# Patient Record
Sex: MALE | Race: WHITE | Employment: FULL TIME | URBAN - METROPOLITAN AREA
[De-identification: names, ages, dates, MRNs, and addresses within clinical notes are randomized per-mention and may not be internally consistent; named-entity substitution may affect disease eponyms.]

---

## 2017-01-10 ENCOUNTER — HOSPITAL ENCOUNTER (OUTPATIENT)
Dept: GENERAL RADIOLOGY | Age: 47
Discharge: HOME | End: 2017-01-10
Attending: INTERNAL MEDICINE
Payer: COMMERCIAL

## 2017-01-10 ENCOUNTER — OFFICE VISIT (OUTPATIENT)
Dept: INTERNAL MEDICINE CLINIC | Facility: CLINIC | Age: 47
End: 2017-01-10

## 2017-01-10 VITALS
TEMPERATURE: 98 F | SYSTOLIC BLOOD PRESSURE: 132 MMHG | DIASTOLIC BLOOD PRESSURE: 80 MMHG | WEIGHT: 197 LBS | HEART RATE: 88 BPM | RESPIRATION RATE: 12 BRPM | BODY MASS INDEX: 26.68 KG/M2 | HEIGHT: 72 IN

## 2017-01-10 DIAGNOSIS — R07.89 OTHER CHEST PAIN: Primary | ICD-10-CM

## 2017-01-10 DIAGNOSIS — R07.89 OTHER CHEST PAIN: ICD-10-CM

## 2017-01-10 PROBLEM — R07.9 CHEST PAIN: Status: ACTIVE | Noted: 2017-01-10

## 2017-01-10 PROCEDURE — 71020 XR CHEST PA + LAT CHEST (CPT=71020): CPT

## 2017-01-10 PROCEDURE — 93010 ELECTROCARDIOGRAM REPORT: CPT | Performed by: INTERNAL MEDICINE

## 2017-01-10 PROCEDURE — 99214 OFFICE O/P EST MOD 30 MIN: CPT | Performed by: INTERNAL MEDICINE

## 2017-01-10 PROCEDURE — 93005 ELECTROCARDIOGRAM TRACING: CPT | Performed by: INTERNAL MEDICINE

## 2017-01-10 PROCEDURE — 99212 OFFICE O/P EST SF 10 MIN: CPT | Performed by: INTERNAL MEDICINE

## 2017-01-10 NOTE — PROGRESS NOTES
HPI:    Patient ID: Myla Greenberg is a 40year old male. Chest Pain   This is a new (sudden sharp jabbing pain on left side of chest, lasting for 1 to 2 seconds) problem. The current episode started in the past 7 days. The onset quality is sudden.  The pr Solution Auto-injector  Disp:  Rfl:    azithromycin (ZITHROMAX Z-SHITAL) 250 MG Oral Tab Take two tablets by mouth today, then one tablet daily. Disp: 6 tablet Rfl: 0     Allergies:  Bee                         Comment:Other reaction(s):  Anaphylaxis, throat c eval today. - EKG In-Office [52406]      No orders of the defined types were placed in this encounter.        Meds This Visit:  No prescriptions requested or ordered in this encounter    Imaging & Referrals:  None       DP#[id number]

## 2017-02-01 ENCOUNTER — TELEPHONE (OUTPATIENT)
Dept: INTERNAL MEDICINE CLINIC | Facility: CLINIC | Age: 47
End: 2017-02-01

## 2017-02-01 DIAGNOSIS — R07.9 CHEST PAIN, UNSPECIFIED TYPE: Primary | ICD-10-CM

## 2017-02-01 NOTE — TELEPHONE ENCOUNTER
Pt verifies info below; states Dr Charles Husain had advised pt to monitor symptoms as is low risk for cardiac event, and f/u if no improvement.  States muscle cramp on left chest is still same as discussed during 1/10/17 OV; there has been no changes in intensity or f

## 2017-02-01 NOTE — TELEPHONE ENCOUNTER
Pt called in more or less for a follow up on his muscle spasms around his chest area. Pt states instead of coming in for an appt with Dr. Jazmine Farfan on Friday, he is wondering if Dr. Jazmine Farfan can guide him into the next steps?   Pt states he is still having these spor

## 2017-02-01 NOTE — TELEPHONE ENCOUNTER
Pt informed regarding stress echo ordered by Dr Mi Otto. Pt thankful and will schedule it now; transferred to Central scheduling and provided with phone # in case disconnects.

## 2017-02-15 ENCOUNTER — HOSPITAL ENCOUNTER (OUTPATIENT)
Dept: CV DIAGNOSTICS | Facility: HOSPITAL | Age: 47
Discharge: HOME | End: 2017-02-15
Attending: INTERNAL MEDICINE
Payer: COMMERCIAL

## 2017-02-15 ENCOUNTER — OFFICE VISIT (OUTPATIENT)
Dept: SLEEP CENTER | Age: 47
End: 2017-02-15
Attending: INTERNAL MEDICINE
Payer: COMMERCIAL

## 2017-02-15 DIAGNOSIS — Z76.89 SLEEP CONCERN: Primary | ICD-10-CM

## 2017-02-15 DIAGNOSIS — R07.9 CHEST PAIN, UNSPECIFIED TYPE: ICD-10-CM

## 2017-02-15 PROCEDURE — 93350 STRESS TTE ONLY: CPT | Performed by: INTERNAL MEDICINE

## 2017-02-15 PROCEDURE — 93350 STRESS TTE ONLY: CPT

## 2017-02-15 PROCEDURE — 93018 CV STRESS TEST I&R ONLY: CPT | Performed by: INTERNAL MEDICINE

## 2017-02-15 PROCEDURE — 95810 POLYSOM 6/> YRS 4/> PARAM: CPT

## 2017-02-15 PROCEDURE — 93016 CV STRESS TEST SUPVJ ONLY: CPT | Performed by: INTERNAL MEDICINE

## 2017-02-15 PROCEDURE — 93017 CV STRESS TEST TRACING ONLY: CPT

## 2017-02-17 ENCOUNTER — TELEPHONE (OUTPATIENT)
Dept: INTERNAL MEDICINE CLINIC | Facility: CLINIC | Age: 47
End: 2017-02-17

## 2017-02-17 ENCOUNTER — HOSPITAL ENCOUNTER (OUTPATIENT)
Age: 47
Discharge: HOME | End: 2017-02-17
Attending: EMERGENCY MEDICINE
Payer: COMMERCIAL

## 2017-02-17 VITALS
HEART RATE: 98 BPM | RESPIRATION RATE: 18 BRPM | TEMPERATURE: 99 F | HEIGHT: 73 IN | WEIGHT: 190 LBS | DIASTOLIC BLOOD PRESSURE: 80 MMHG | SYSTOLIC BLOOD PRESSURE: 134 MMHG | BODY MASS INDEX: 25.18 KG/M2 | OXYGEN SATURATION: 98 %

## 2017-02-17 DIAGNOSIS — J01.90 ACUTE SINUSITIS, RECURRENCE NOT SPECIFIED, UNSPECIFIED LOCATION: Primary | ICD-10-CM

## 2017-02-17 PROCEDURE — 99213 OFFICE O/P EST LOW 20 MIN: CPT

## 2017-02-17 PROCEDURE — 99214 OFFICE O/P EST MOD 30 MIN: CPT

## 2017-02-17 RX ORDER — AZITHROMYCIN 250 MG/1
TABLET, FILM COATED ORAL
Qty: 1 PACKAGE | Refills: 0 | Status: SHIPPED | OUTPATIENT
Start: 2017-02-17 | End: 2017-03-30 | Stop reason: ALTCHOICE

## 2017-02-17 NOTE — ED INITIAL ASSESSMENT (HPI)
Complains of uri cough cold fever and exudate from eyes. For one week. Wants prescription for Rakesh Escalante.

## 2017-02-17 NOTE — ED PROVIDER NOTES
Patient Seen in: [facility] In [address] [place] [address]    History   Patient presents with:  Cough/URI    Stated Complaint: fever/uri    HPI    Patient is a 46-year-old male that started about 5 or 6 days ago with a sore throat and he developed sin • Diabetes Father 79     per NG   • Obesity Father      per NG   • Hypertension Mother      per NG   • Obesity Mother      per NG   • Thyroid Disorder Mother      per NG   • Heart Disease Paternal Grandfather 79     CAD, Cause of death; per NG   • Other[ rhythm, normal heart sounds and intact distal pulses. Pulmonary/Chest: Effort normal and breath sounds normal. No respiratory distress. Abdominal: Soft. Bowel sounds are normal. Exhibits no distension and no mass. There is no tenderness.  There is no r

## 2017-02-17 NOTE — TELEPHONE ENCOUNTER
Pt contacted, he states he just went to IC, he did not want to wait any longer. No further assistance needed from triage.

## 2017-02-17 NOTE — TELEPHONE ENCOUNTER
Pt. States that he has a low grade fever 100-101, and possible sinus infection. Pt. Wants to know if  can send a Rx for an antibiotic to his [facility] Pt. Requesting to speak to RN.

## 2017-03-01 ENCOUNTER — TELEPHONE (OUTPATIENT)
Dept: INTERNAL MEDICINE CLINIC | Facility: CLINIC | Age: 47
End: 2017-03-01

## 2017-03-01 DIAGNOSIS — G47.33 OSA (OBSTRUCTIVE SLEEP APNEA): Primary | ICD-10-CM

## 2017-03-01 NOTE — TELEPHONE ENCOUNTER
Notes Recorded by Kenneth Del Angel MD on 2/25/2017 at 7:40 AM  The data generated from this study is consistent with mild obstructive sleep apnea which becomes moderate in REM sleep (ICD 10 code G47.33).     RECOMMENDATIONS:     1.      Can consider AppTap

## 2017-03-03 NOTE — TELEPHONE ENCOUNTER
Pt is calling back, pt would like a call back today, states he has been calling since 3/1. Please advise

## 2017-03-07 NOTE — TELEPHONE ENCOUNTER
Pt called, message per Dr given. Verbalized understanding and compliance  Requested to have order for CPAP placed, however, unsure if he will complete. Wants to think about it.   Order placed

## 2017-03-30 ENCOUNTER — OFFICE VISIT (OUTPATIENT)
Dept: INTERNAL MEDICINE CLINIC | Facility: CLINIC | Age: 47
End: 2017-03-30

## 2017-03-30 VITALS
TEMPERATURE: 97 F | WEIGHT: 190 LBS | HEART RATE: 70 BPM | HEIGHT: 73 IN | DIASTOLIC BLOOD PRESSURE: 78 MMHG | SYSTOLIC BLOOD PRESSURE: 125 MMHG | BODY MASS INDEX: 25.18 KG/M2

## 2017-03-30 DIAGNOSIS — H93.13 TINNITUS, BILATERAL: Primary | ICD-10-CM

## 2017-03-30 DIAGNOSIS — H91.93 DECREASED HEARING, BILATERAL: ICD-10-CM

## 2017-03-30 PROCEDURE — 99212 OFFICE O/P EST SF 10 MIN: CPT | Performed by: INTERNAL MEDICINE

## 2017-03-30 PROCEDURE — 99213 OFFICE O/P EST LOW 20 MIN: CPT | Performed by: INTERNAL MEDICINE

## 2017-03-30 NOTE — PROGRESS NOTES
HPI:    Patient ID: Christian Aviles is a 40year old male. Ear Problem   There is pain in the right (complained of some discomfort, decreased hearing) ear. This is a new problem. The current episode started in the past 7 days.  The problem occurs constantly exhibits no discharge. No scleral icterus. Neck: Normal range of motion. Neck supple. No JVD present. No thyromegaly present. Lymphadenopathy:     He has no cervical adenopathy. Neurological: He is alert. Skin: He is not diaphoretic.               A

## 2017-04-03 ENCOUNTER — OFFICE VISIT (OUTPATIENT)
Dept: AUDIOLOGY | Facility: CLINIC | Age: 47
End: 2017-04-03

## 2017-04-03 ENCOUNTER — OFFICE VISIT (OUTPATIENT)
Dept: OTOLARYNGOLOGY | Facility: CLINIC | Age: 47
End: 2017-04-03

## 2017-04-03 VITALS
TEMPERATURE: 98 F | HEIGHT: 73 IN | BODY MASS INDEX: 25.18 KG/M2 | WEIGHT: 190 LBS | SYSTOLIC BLOOD PRESSURE: 124 MMHG | DIASTOLIC BLOOD PRESSURE: 82 MMHG

## 2017-04-03 DIAGNOSIS — H93.13 TINNITUS, BILATERAL: Primary | ICD-10-CM

## 2017-04-03 DIAGNOSIS — H69.91 EUSTACHIAN TUBE DISORDER, RIGHT: Primary | ICD-10-CM

## 2017-04-03 PROCEDURE — 92567 TYMPANOMETRY: CPT | Performed by: AUDIOLOGIST

## 2017-04-03 PROCEDURE — 99212 OFFICE O/P EST SF 10 MIN: CPT | Performed by: OTOLARYNGOLOGY

## 2017-04-03 PROCEDURE — 92557 COMPREHENSIVE HEARING TEST: CPT | Performed by: AUDIOLOGIST

## 2017-04-03 PROCEDURE — 99213 OFFICE O/P EST LOW 20 MIN: CPT | Performed by: OTOLARYNGOLOGY

## 2017-04-03 RX ORDER — METHYLPREDNISOLONE 4 MG/1
TABLET ORAL
Qty: 1 PACKAGE | Refills: 0 | Status: SHIPPED | OUTPATIENT
Start: 2017-04-03 | End: 2017-06-21 | Stop reason: ALTCHOICE

## 2017-04-03 NOTE — PROGRESS NOTES
Rodrigo Daniel is a 40year old male.   Patient presents with:  Ear Problem: pt feels like there is water in both ears for 1.5 weeks, right ear is worse, pain comes and goes       HISTORY OF PRESENT ILLNESS    6/1/16 Patient presents for evaluation of a 4 wee Years of Education:                 Number of children:               Social History Main Topics    Smoking Status: Never Smoker                      Smokeless Status: Never Used                        Comment: per NG    Alcohol Use: Yes Eyes Negative Blurred vision and vision changes. Respiratory Negative Dyspnea and wheezing. Cardio Negative Chest pain, irregular heartbeat/palpitations and syncope. GI Negative Abdominal pain and diarrhea.    Endocrine Negative Cold intolerance and methylPREDNISolone 4 MG Oral Tablet Therapy Pack, Take by oral route., Disp: 1 Package, Rfl: 0  •  ClonazePAM 0.5 MG Oral Tab, Take 1 tablet (0.5 mg total) by mouth nightly as needed for Anxiety. , Disp: 10 tablet, Rfl: 0  •  Esomeprazole Magnesium 40 MG Or

## 2017-04-03 NOTE — PROGRESS NOTES
AUDIOGRAM     Myla Greenberg was referred for testing by Abhay Cary due to a sensation of water in his right ear and bilateral tinnitus.    12/14/1970  [id number]    Otoscopic Inspection:  Right ear:  No cerumen  Left ear:  No cerumen    Audiometric Test

## 2017-04-03 NOTE — PATIENT INSTRUCTIONS
Understanding Nasal Allergies  Nasal allergies (also called allergic rhinitis) are a common health problem. They may be seasonal. This means they cause symptoms only at certain times of the year.  Or they may be perennial. This means they cause symptoms a Treatment  Your healthcare provider will evaluate you to find the cause of your symptoms then recommend treatment.  If your symptoms are due to nasal allergies, your healthcare provider may prescribe nasal steroid sprays or oral antihistamines to help reduc

## 2017-05-30 NOTE — TELEPHONE ENCOUNTER
Pt is asking to have a meds refilled   Pt will be flying out of town very soon   Pt only need to have 10pills for the clonazepam     Current Outpatient Prescriptions:  ClonazePAM 0.5 MG Oral Tab Take 1 tablet (0.5 mg total) by mouth nightly as needed for A

## 2017-05-31 RX ORDER — ESOMEPRAZOLE MAGNESIUM 40 MG/1
CAPSULE, DELAYED RELEASE ORAL
Qty: 90 CAPSULE | Refills: 0 | Status: SHIPPED | OUTPATIENT
Start: 2017-05-31

## 2017-05-31 NOTE — TELEPHONE ENCOUNTER
Rx refilled for 90 days. Pt will be due to for annual OV this July. Alternatively, pt may ask his PCP for additional refills.

## 2017-06-02 ENCOUNTER — TELEPHONE (OUTPATIENT)
Dept: INTERNAL MEDICINE CLINIC | Facility: CLINIC | Age: 47
End: 2017-06-02

## 2017-06-02 ENCOUNTER — TELEPHONE (OUTPATIENT)
Dept: GASTROENTEROLOGY | Facility: CLINIC | Age: 47
End: 2017-06-02

## 2017-06-02 RX ORDER — CLONAZEPAM 0.5 MG/1
0.5 TABLET ORAL NIGHTLY PRN
Qty: 6 TABLET | Refills: 0 | OUTPATIENT
Start: 2017-06-02 | End: 2017-06-21 | Stop reason: ALTCHOICE

## 2017-06-02 RX ORDER — AZITHROMYCIN 250 MG/1
TABLET, FILM COATED ORAL
Qty: 6 TABLET | Refills: 0 | Status: SHIPPED | OUTPATIENT
Start: 2017-06-02 | End: 2017-06-21 | Stop reason: ALTCHOICE

## 2017-06-02 NOTE — TELEPHONE ENCOUNTER
Received denial letter for esomeprazole from [address] [place] [address] on 6/2/17 @3527. I contacted [address] [place] [address] again to review the Appeals process.    I spoke with Jody Dang and she states that they need a letter from the provider stating that \"it is medically necessary for the p

## 2017-06-02 NOTE — TELEPHONE ENCOUNTER
Per pt, he is wondering why his Clonazepam was not refilled? Pt is going out of town and need this med for flying. Per pt, he needs only 10 tabs.  Pls advise,

## 2017-06-02 NOTE — TELEPHONE ENCOUNTER
Per Pharmacist, pt prefers Omeprazole instead of pantoprazole- but insurance preferred is Pantoprazole and so  requires PA on Omeprazole. Requesting a call back to advise.

## 2017-06-02 NOTE — TELEPHONE ENCOUNTER
Actions Requested: requesting Zpack/ clonazepam flying next week for vacation  Situation/Background   Problem: sinus issues   Onset: yesterday   Associated Symptoms: taking Zicam,,pressure between eyes,little cough/congestion-stated his children had been s pain (lower forehead, cheekbone, or eye) persists  * Lots of coughing    Care Advice Discussed:  * Reassurance

## 2017-06-02 NOTE — TELEPHONE ENCOUNTER
Please order a Z SHITAL for patient. Have patient see me if not improving in next few days. Order 6 of the clonazepam 0.5 mg to be used 1 hour before flight.

## 2017-06-02 NOTE — TELEPHONE ENCOUNTER
Please sign off on pended Clonazepam order ( for phone in) for 6 tablets. Thank you. Z-pack order sent to pharmacy.

## 2017-06-02 NOTE — TELEPHONE ENCOUNTER
Denial letter, appeal letter, and clinicals faxed to the [facility] department ([phone]). -Awaiting insurance decision.

## 2017-06-02 NOTE — TELEPHONE ENCOUNTER
Dr. Catherine Solares for Dr. Melvi Sheikh, d/t being out of office. Pt requesting Clonazepam. States needs this med for flying and is going out of town soon. Requesting only 10 tablets please review. Thank you.     Refill Protocol Appointment Criteria  · Appointment sc

## 2017-06-02 NOTE — TELEPHONE ENCOUNTER
Clonazepam called into the [facility] pharmacy in [facility] as ordered on 6/2/17 by Dr. Gt Chaney. Message was left that the medication was sent to the pharmacy. To call back only if needed.

## 2017-06-02 NOTE — TELEPHONE ENCOUNTER
Contacted [facility] pharmacy and spoke to Duncan who states that a PA is required for nexium. Pt ID MLIN:[id number].      I contacted Darliss Cheadle at [phone] and spoke to Garret Isidro who states that this medication is a \"benefit exclusion\" and that she will sen

## 2017-06-06 RX ORDER — CLONAZEPAM 0.5 MG/1
0.5 TABLET ORAL NIGHTLY PRN
Qty: 10 TABLET | Refills: 0 | Status: SHIPPED | OUTPATIENT
Start: 2017-06-06 | End: 2017-07-20

## 2017-06-08 NOTE — TELEPHONE ENCOUNTER
Stacey contacted at [employer] and she transferred me to [phone] and I spoke to Alison Almeida and he transferred me again to [phone].  I spoke to [facility] and she states that they did receive the appeal and they should reach a decision by 6/23/17 regarding th

## 2017-06-20 NOTE — TELEPHONE ENCOUNTER
Contacted [employer] and spoke with Malka (call reference number [id number]). He states that the insurance upheld their original decision to reject the claim for nexium on 6/13/2017. -LM for patient to CB to inform.

## 2017-06-21 ENCOUNTER — OFFICE VISIT (OUTPATIENT)
Dept: INTERNAL MEDICINE CLINIC | Facility: CLINIC | Age: 47
End: 2017-06-21

## 2017-06-21 VITALS
DIASTOLIC BLOOD PRESSURE: 81 MMHG | BODY MASS INDEX: 26 KG/M2 | SYSTOLIC BLOOD PRESSURE: 128 MMHG | HEART RATE: 69 BPM | WEIGHT: 194.31 LBS

## 2017-06-21 DIAGNOSIS — R42 DIZZINESS: Primary | ICD-10-CM

## 2017-06-21 PROCEDURE — 99212 OFFICE O/P EST SF 10 MIN: CPT | Performed by: INTERNAL MEDICINE

## 2017-06-21 PROCEDURE — 99213 OFFICE O/P EST LOW 20 MIN: CPT | Performed by: INTERNAL MEDICINE

## 2017-06-21 RX ORDER — MOMETASONE 50 UG/1
SPRAY, METERED NASAL DAILY
COMMUNITY
End: 2018-07-19

## 2017-06-21 NOTE — PROGRESS NOTES
Patient ID: Ministerio Regan is a 40year old male. Patient presents with:  Dizziness       HISTORY OF PRESENT ILLNESS:   HPI  Patient presents for above. Patient states he is feeling the sensation that he is on a cruise ship for the past 4 days.   Patient d Furoate 50 MCG/ACT Nasal Suspension, by Nasal route daily. , Disp: , Rfl:   •  ESOMEPRAZOLE MAGNESIUM 40 MG Oral Capsule Delayed Release, TAKE 1 CAPSULE (40 MG TOTAL) BY MOUTH DAILY. , Disp: 90 capsule, Rfl: 0  •  ClonazePAM 0.5 MG Oral Tab, Take 1 tablet (0 and oriented to person, place, and time. No cranial nerve deficit. Coordination normal.   Skin: Skin is warm and dry.      Orthostatics:  Laying: Blood pressure 127/81, heart rate 69  Sitting: Blood pressure 160/93, heart rate 78  Standing: Blood pressure 1

## 2017-06-21 NOTE — PATIENT INSTRUCTIONS
1.  Take Dramamine as was doing on cruise ship. 2.  If no improvement by early next week then make appointment for follow-up for new medication. Dizziness (Uncertain Cause)  Dizziness is a common symptom.  It may be described as lightheadedness, spinning, Date Last Reviewed: 8/23/2015  © 9541-2947 [address], [address] [place], [place] [address]. All rights reserved. This information is not intended as a substitute for professional medical care.  Always follow your healthcare professional

## 2017-06-23 NOTE — TELEPHONE ENCOUNTER
Attempted to reach patient again and unable to get a hold of him.  LMTCB    I called [facility] pharmacy and spoke with Nalini Davis and informed her that not only did the office complete a PA on the nexium, but we also sent an appeals letter which the insurance still

## 2017-06-26 ENCOUNTER — OFFICE VISIT (OUTPATIENT)
Dept: OTOLARYNGOLOGY | Facility: CLINIC | Age: 47
End: 2017-06-26

## 2017-06-26 ENCOUNTER — OFFICE VISIT (OUTPATIENT)
Dept: AUDIOLOGY | Facility: CLINIC | Age: 47
End: 2017-06-26

## 2017-06-26 ENCOUNTER — OFFICE VISIT (OUTPATIENT)
Dept: INTERNAL MEDICINE CLINIC | Facility: CLINIC | Age: 47
End: 2017-06-26

## 2017-06-26 VITALS
TEMPERATURE: 99 F | BODY MASS INDEX: 25.58 KG/M2 | HEIGHT: 73 IN | SYSTOLIC BLOOD PRESSURE: 130 MMHG | DIASTOLIC BLOOD PRESSURE: 82 MMHG | WEIGHT: 193 LBS

## 2017-06-26 VITALS
BODY MASS INDEX: 26.14 KG/M2 | HEIGHT: 72 IN | DIASTOLIC BLOOD PRESSURE: 87 MMHG | RESPIRATION RATE: 12 BRPM | SYSTOLIC BLOOD PRESSURE: 123 MMHG | TEMPERATURE: 97 F | WEIGHT: 193 LBS | HEART RATE: 73 BPM

## 2017-06-26 DIAGNOSIS — H93.13 TINNITUS, BILATERAL: ICD-10-CM

## 2017-06-26 DIAGNOSIS — R42 DIZZINESS: Primary | ICD-10-CM

## 2017-06-26 DIAGNOSIS — R42 DIZZINESS: ICD-10-CM

## 2017-06-26 DIAGNOSIS — R42 DISEQUILIBRIUM: Primary | ICD-10-CM

## 2017-06-26 PROCEDURE — 92567 TYMPANOMETRY: CPT | Performed by: AUDIOLOGIST

## 2017-06-26 PROCEDURE — 99212 OFFICE O/P EST SF 10 MIN: CPT | Performed by: OTOLARYNGOLOGY

## 2017-06-26 PROCEDURE — 92557 COMPREHENSIVE HEARING TEST: CPT | Performed by: AUDIOLOGIST

## 2017-06-26 PROCEDURE — 99213 OFFICE O/P EST LOW 20 MIN: CPT | Performed by: OTOLARYNGOLOGY

## 2017-06-26 PROCEDURE — 99214 OFFICE O/P EST MOD 30 MIN: CPT | Performed by: INTERNAL MEDICINE

## 2017-06-26 PROCEDURE — 99212 OFFICE O/P EST SF 10 MIN: CPT | Performed by: INTERNAL MEDICINE

## 2017-06-26 RX ORDER — MECLIZINE HCL 12.5 MG/1
12.5 TABLET ORAL 3 TIMES DAILY PRN
Qty: 30 TABLET | Refills: 4 | Status: SHIPPED | OUTPATIENT
Start: 2017-06-26 | End: 2017-07-06

## 2017-06-26 NOTE — PROGRESS NOTES
Christian Aviles is a 40year old male.   Patient presents with:  Dizziness: Patient is here today c/o motion sickness since returning from a cruise nine days ago      [address] [place] [address]    6/1/16 Patient presents for evaluation of a 4 week history of a on a cruise especially if he sits down he will have a tremendous amount of feeling almost like using to be seasick.   No new changes with his ears  Social History    Marital status:              Spouse name:                       Years of education: wisdom tooth: Extraction; per NG  No date: UPPER GI ENDOSCOPY PERFORMED      REVIEW OF SYSTEMS    System Neg/Pos Details   Constitutional Negative Fatigue, fever and weight loss. ENMT Negative Drooling. Eyes Negative Blurred vision and vision changes. Nose/Mouth/Throat Normal Nares - Right: Normal Left: Normal. Septum -Normal  Turbinates - Right: Normal, Left: Normal.         Current Outpatient Prescriptions:   •  Meclizine HCl 12.5 MG Oral Tab, Take 1 tablet (12.5 mg total) by mouth 3 (three) times d

## 2017-06-26 NOTE — PATIENT INSTRUCTIONS
Understanding Dizziness, Balance Problems, and Fainting  Balance is a group effort of the eyes, inner ear, joints, and muscles. They each send signals to the brain about body position and head movement.  Then the brain uses this information to achieve bal

## 2017-06-26 NOTE — PROGRESS NOTES
HPI:    Patient ID: Storm Headley is a 40year old male. Dizziness   This is a new (pt's feel equilibrium is off) problem. The current episode started 1 to 4 weeks ago (9 days after cruise). The problem occurs constantly. The problem has been unchanged. well-developed and well-nourished. No distress. HENT:   Right Ear: External ear normal.   Left Ear: External ear normal.   Nose: Nose normal.   Mouth/Throat: Oropharynx is clear and moist. No oropharyngeal exudate.    Eyes: Conjunctivae and EOM are normal however given persistent symptom, I would proceed with mri of brain/IAC. I mentioned to him a rare condition called disembarkment syndrome which can be sometimes seen in people after disembarking from ships/boat which he did.   I advised him also to see ENT

## 2017-06-27 ENCOUNTER — TELEPHONE (OUTPATIENT)
Dept: INTERNAL MEDICINE CLINIC | Facility: CLINIC | Age: 47
End: 2017-06-27

## 2017-06-27 DIAGNOSIS — R42 DISEQUILIBRIUM: Primary | ICD-10-CM

## 2017-06-27 NOTE — TELEPHONE ENCOUNTER
[employer] is stating patient meets their criteria for a MRI Adam Edwardscher without and with contrast [id number]. Would you like to change test or speak with physician reviewer? Please advise.

## 2017-06-27 NOTE — TELEPHONE ENCOUNTER
I am ok with the mri brain with and without contrast. I had put new order in epic. pls have one of staff notify pt of approval for mri so he can get it scheduled and done. Thanks for your help. I appreciate it.  Dr Ace Cook

## 2017-06-27 NOTE — PROGRESS NOTES
AUDIOGRAM     [place] was referred for testing by Christy Redman for dizziness and tinnitus   12/14/1970  [id number]      Otoscopic inspection: right ear no cerumen; left ear no cerumen.        Tests/Procedures  Patient was tested via  standard i

## 2017-06-28 ENCOUNTER — TELEPHONE (OUTPATIENT)
Dept: GASTROENTEROLOGY | Facility: CLINIC | Age: 47
End: 2017-06-28

## 2017-06-28 ENCOUNTER — OFFICE VISIT (OUTPATIENT)
Dept: SLEEP CENTER | Age: 47
End: 2017-06-28
Attending: INTERNAL MEDICINE
Payer: COMMERCIAL

## 2017-06-28 DIAGNOSIS — Z76.89 SLEEP CONCERN: Primary | ICD-10-CM

## 2017-06-28 PROCEDURE — 95811 POLYSOM 6/>YRS CPAP 4/> PARM: CPT

## 2017-06-28 NOTE — TELEPHONE ENCOUNTER
See 6/2/17 encounter - where PA was done and even appeal was denied    Pt is trying to use flex spending account to get Nexium    Stating he needs a letter of medical necessity for need for Nexium    Last seen 7/13/2016 for chronic GERD symptoms    Please

## 2017-06-28 NOTE — TELEPHONE ENCOUNTER
Pt needs note for his flex spending account. Note needs to state that is is necessary for pt to be on Nexium. Please send note through My Chart or mail to home address.

## 2017-07-05 ENCOUNTER — HOSPITAL ENCOUNTER (OUTPATIENT)
Dept: MRI IMAGING | Age: 47
Discharge: HOME | End: 2017-07-05
Attending: INTERNAL MEDICINE
Payer: COMMERCIAL

## 2017-07-05 DIAGNOSIS — H93.13 TINNITUS, BILATERAL: ICD-10-CM

## 2017-07-05 DIAGNOSIS — R42 DISEQUILIBRIUM: ICD-10-CM

## 2017-07-05 PROCEDURE — 70553 MRI BRAIN STEM W/O & W/DYE: CPT | Performed by: INTERNAL MEDICINE

## 2017-07-05 PROCEDURE — 70551 MRI BRAIN STEM W/O DYE: CPT | Performed by: INTERNAL MEDICINE

## 2017-07-05 PROCEDURE — A9575 INJ GADOTERATE MEGLUMI 0.1ML: HCPCS | Performed by: INTERNAL MEDICINE

## 2017-07-06 ENCOUNTER — TELEPHONE (OUTPATIENT)
Dept: INTERNAL MEDICINE CLINIC | Facility: CLINIC | Age: 47
End: 2017-07-06

## 2017-07-06 DIAGNOSIS — G47.30 SLEEP APNEA, UNSPECIFIED TYPE: Primary | ICD-10-CM

## 2017-07-06 NOTE — TELEPHONE ENCOUNTER
This is from test results section already entered today,\"Patient has significant sleep apnea as seen on recent sleep test and will improve on a CPAP Machine.  Please call his Durable Medical Equipment (DME) provider through insurance and set it to a  CPAP

## 2017-07-06 NOTE — TELEPHONE ENCOUNTER
I have pended a letter for pt.  Please let me know if anything further needs to be added to the letter

## 2017-07-06 NOTE — TELEPHONE ENCOUNTER
Jahaira Gonzalez, go ahead and sign off. It looks good. It will then go to [employer] and I have also mailed a copy to home. I left voicemail at all 3 numbers that letter was done. Thanks.

## 2017-07-06 NOTE — TELEPHONE ENCOUNTER
Patient called and stated recently had sleep study done would like results-    Requesting a call back from nurse-

## 2017-07-06 NOTE — TELEPHONE ENCOUNTER
Re-sent to Migdalia GUTIÉRREZ--could you generate a letter for pt stating the Nexium you ordered is medically necessary? His insurance no longer covers, but he can use his flex spending account if he has a letter.  --please send back to GI RN pool when ready and dalila

## 2017-07-07 NOTE — TELEPHONE ENCOUNTER
Spoke with patient (identified name and ), results reviewed and agrees with plan. Face sheet, order, sleep study.  Titration study and Office visit notes with sleep diagnosis faxed to [facility] at [phone]    Patient wished phone number for [phone] give

## 2017-07-12 ENCOUNTER — TELEPHONE (OUTPATIENT)
Dept: INTERNAL MEDICINE CLINIC | Facility: CLINIC | Age: 47
End: 2017-07-12

## 2017-07-12 NOTE — TELEPHONE ENCOUNTER
Phone call to MRI for results since our system states results not available. S/W Chrissy Breen who states they results are in the system. States he will transfer me to Med Records to have results faxed to us.  Medical Records stated they couldn't find it either in t

## 2017-07-12 NOTE — TELEPHONE ENCOUNTER
Pt states he spoke to [address] [place] [address] and was informed they have not received anything from Office in regards sleep study. Pt is asking for a call today please. Pt states he is concern and would like to speak to nurse today. Please advise.

## 2017-07-13 NOTE — TELEPHONE ENCOUNTER
Patient called back and was informed with understanding. Will contact Frances tomorrow. Per Lucy Tinoco she contacted [employer] and they stated they did receive the all the information.

## 2017-07-13 NOTE — TELEPHONE ENCOUNTER
Spoke with Julieta Acuna, the [facility]   Transfer to [phone].

## 2017-07-13 NOTE — TELEPHONE ENCOUNTER
SPoke with pt informed results, pt states he already f/u with ENT, wanted to f/u with Dr Eliza ramost for tomorrow at 2:15

## 2017-07-14 ENCOUNTER — OFFICE VISIT (OUTPATIENT)
Dept: INTERNAL MEDICINE CLINIC | Facility: CLINIC | Age: 47
End: 2017-07-14

## 2017-07-14 VITALS
DIASTOLIC BLOOD PRESSURE: 83 MMHG | HEIGHT: 73 IN | HEART RATE: 71 BPM | SYSTOLIC BLOOD PRESSURE: 131 MMHG | WEIGHT: 190 LBS | BODY MASS INDEX: 25.18 KG/M2

## 2017-07-14 DIAGNOSIS — R42 DIZZINESS: Primary | ICD-10-CM

## 2017-07-14 DIAGNOSIS — H53.8 BLURRED VISION: ICD-10-CM

## 2017-07-14 PROCEDURE — 99213 OFFICE O/P EST LOW 20 MIN: CPT | Performed by: INTERNAL MEDICINE

## 2017-07-14 PROCEDURE — 99212 OFFICE O/P EST SF 10 MIN: CPT | Performed by: INTERNAL MEDICINE

## 2017-07-15 NOTE — PROGRESS NOTES
HPI:    Patient ID: Yazmin Aguilar is a 40year old male. Vertigo   This is a chronic problem. The current episode started more than 1 month ago. The problem occurs intermittently. The problem has been waxing and waning.  Associated symptoms include vertig distress. He has no wheezes. He has no rales. Lymphadenopathy:     He has no cervical adenopathy. Skin: He is not diaphoretic.               ASSESSMENT/PLAN:   (R42) Dizziness  (primary encounter diagnosis)  Plan: he states his dizziness still on going

## 2017-07-19 ENCOUNTER — TELEPHONE (OUTPATIENT)
Dept: INTERNAL MEDICINE CLINIC | Facility: CLINIC | Age: 47
End: 2017-07-19

## 2017-07-19 NOTE — TELEPHONE ENCOUNTER
Pt scheduled an appt via [employer] with Dr. Gene Modi for 8/2 with these symptoms:    Visit Type: Teri Golden ([id number])      8/2/2017     2:10 PM  20 mins. Patricia Mathew MD   [facility]      Patient Comments:   [facility] Follow-up Visit   Dizziness and bal

## 2017-07-19 NOTE — TELEPHONE ENCOUNTER
Spoke with Karley BECKER Ext [phone], Deandre Etienne, who stated she received all the information on the patient for processing the CPAP. Informed her this matter should be expedited due to the fact he should have had the CPAP two weeks ago.   She said she would do her b

## 2017-07-19 NOTE — TELEPHONE ENCOUNTER
Pt calling states he has been having issues with our office sending his order for CPAP and supplies to [address] [place] [address] for the last two weeks.  Pt states he would like to speak to nurse to day to clarify what is happening since there is some type of issue he would li

## 2017-07-19 NOTE — TELEPHONE ENCOUNTER
Spoke with [employer] from [place], gave her the information the paperwork was faxed 7/7, 7/13, and today, with the times of fax.    Kathya stated she found the paperwork for 7/7, was looking for  7/13, and was going to contact a Supervisor to expedite the delivery

## 2017-07-19 NOTE — TELEPHONE ENCOUNTER
Pt said he spoke with Nichelle Wright this morning & was told they have no information    Pt requesting RN call him back today with name of person at [facility] that has his information

## 2017-07-20 ENCOUNTER — OFFICE VISIT (OUTPATIENT)
Dept: INTERNAL MEDICINE CLINIC | Facility: CLINIC | Age: 47
End: 2017-07-20

## 2017-07-20 VITALS
HEART RATE: 80 BPM | WEIGHT: 187 LBS | BODY MASS INDEX: 24.78 KG/M2 | HEIGHT: 73 IN | DIASTOLIC BLOOD PRESSURE: 74 MMHG | SYSTOLIC BLOOD PRESSURE: 121 MMHG

## 2017-07-20 DIAGNOSIS — F41.9 ANXIETY: ICD-10-CM

## 2017-07-20 DIAGNOSIS — G47.33 OSA (OBSTRUCTIVE SLEEP APNEA): Primary | ICD-10-CM

## 2017-07-20 DIAGNOSIS — R53.83 FATIGUE, UNSPECIFIED TYPE: ICD-10-CM

## 2017-07-20 PROCEDURE — 99212 OFFICE O/P EST SF 10 MIN: CPT | Performed by: INTERNAL MEDICINE

## 2017-07-20 PROCEDURE — 99214 OFFICE O/P EST MOD 30 MIN: CPT | Performed by: INTERNAL MEDICINE

## 2017-07-20 RX ORDER — CLONAZEPAM 0.5 MG/1
0.5 TABLET ORAL NIGHTLY PRN
Qty: 30 TABLET | Refills: 0 | Status: SHIPPED | OUTPATIENT
Start: 2017-07-20 | End: 2017-09-14

## 2017-07-20 NOTE — PROGRESS NOTES
William Toure is a 40year old male. HPI:   1. MICHELLE (obstructive sleep apnea)    Has been diagnosed with MICHELLE. Dad and brother are both treated for thos. Has poor sleep as long as he can remember. No energy.  Now feels dizzy and mildly confused the last few m well otherwise  SKIN: denies any unusual skin lesions or rashes  RESPIRATORY: denies shortness of breath with exertion  CARDIOVASCULAR: denies chest pain on exertion  GI: denies abdominal pain and denies heartburn  NEURO: denies headaches    EXAM:

## 2017-07-20 NOTE — TELEPHONE ENCOUNTER
Actions Requested: pt reports intermittent dizziness for past five weeks since returning from cruise. Denies SOB, difficulty breathing, chest pain, headache. Pt reports occasional nausea.   OV made with MD for 7/20/17  Problem: ongoing dizziness  Onset an hours of rest and fluids  * Fever > 103 F (39.4 C)  * Fever > 100.5 F (38.1 C) and has diabetes mellitus or a weak immune system (e.g., HIV positive, cancer chemotherapy, organ transplant, splenectomy, chronic steroids)  * Vomiting occurs with dizziness  *

## 2017-07-31 NOTE — TELEPHONE ENCOUNTER
Pt is calling for status of his message and would like a call back from the RN today. Pt can be reached at [phone].

## 2017-07-31 NOTE — TELEPHONE ENCOUNTER
Pt is calling state that Jhon Delgado is requesting some more info   Pt is requesting to speak with RN Tam Friend pt state that he will explain when he speak to the RN

## 2017-08-01 NOTE — TELEPHONE ENCOUNTER
Edilma Alexander contacted, advised that on 7/19 they informed us they had everything that they needed. Representative states that they need original sleep study, done before titration and also last office visit notes. She confirms nothing further is needed.   Cynthia

## 2017-08-01 NOTE — TELEPHONE ENCOUNTER
Pt's wife called, asking to speak with Zhen GO. Wife Jennifer Morgan is requesting more information.       # [phone]

## 2017-08-09 ENCOUNTER — TELEPHONE (OUTPATIENT)
Dept: INTERNAL MEDICINE CLINIC | Facility: CLINIC | Age: 47
End: 2017-08-09

## 2017-08-09 ENCOUNTER — HOSPITAL ENCOUNTER (EMERGENCY)
Facility: HOSPITAL | Age: 47
Discharge: HOME | End: 2017-08-09
Payer: COMMERCIAL

## 2017-08-09 ENCOUNTER — APPOINTMENT (OUTPATIENT)
Dept: CT IMAGING | Facility: HOSPITAL | Age: 47
End: 2017-08-09
Attending: EMERGENCY MEDICINE
Payer: COMMERCIAL

## 2017-08-09 ENCOUNTER — APPOINTMENT (OUTPATIENT)
Dept: GENERAL RADIOLOGY | Facility: HOSPITAL | Age: 47
End: 2017-08-09
Payer: COMMERCIAL

## 2017-08-09 ENCOUNTER — HOSPITAL ENCOUNTER (OUTPATIENT)
Age: 47
Discharge: HOME | End: 2017-08-09
Attending: FAMILY MEDICINE
Payer: COMMERCIAL

## 2017-08-09 VITALS
BODY MASS INDEX: 25.18 KG/M2 | WEIGHT: 190 LBS | TEMPERATURE: 97 F | SYSTOLIC BLOOD PRESSURE: 126 MMHG | HEIGHT: 73 IN | HEART RATE: 79 BPM | DIASTOLIC BLOOD PRESSURE: 79 MMHG | RESPIRATION RATE: 18 BRPM | OXYGEN SATURATION: 98 %

## 2017-08-09 VITALS
HEART RATE: 93 BPM | TEMPERATURE: 98 F | WEIGHT: 190 LBS | DIASTOLIC BLOOD PRESSURE: 91 MMHG | HEIGHT: 73 IN | RESPIRATION RATE: 16 BRPM | BODY MASS INDEX: 25.18 KG/M2 | SYSTOLIC BLOOD PRESSURE: 130 MMHG | OXYGEN SATURATION: 99 %

## 2017-08-09 DIAGNOSIS — Z78.9 DIFFICULTY WITH CPAP USE: Primary | ICD-10-CM

## 2017-08-09 DIAGNOSIS — A08.4 VIRAL ENTERITIS: Primary | ICD-10-CM

## 2017-08-09 DIAGNOSIS — R10.13 EPIGASTRIC PAIN: Primary | ICD-10-CM

## 2017-08-09 LAB
ALBUMIN SERPL BCP-MCNC: 4.8 G/DL (ref 3.5–4.8)
ALP SERPL-CCNC: 68 U/L (ref 32–100)
ALT SERPL-CCNC: 24 U/L (ref 17–63)
ANION GAP SERPL CALC-SCNC: 11 MMOL/L (ref 0–18)
AST SERPL-CCNC: 26 U/L (ref 15–41)
BASOPHILS # BLD: 0 K/UL (ref 0–0.2)
BASOPHILS NFR BLD: 0 %
BILIRUB DIRECT SERPL-MCNC: 0.1 MG/DL (ref 0–0.2)
BILIRUB SERPL-MCNC: 1.2 MG/DL (ref 0.3–1.2)
BUN SERPL-MCNC: 14 MG/DL (ref 8–20)
BUN/CREAT SERPL: 10.2 (ref 10–20)
CALCIUM SERPL-MCNC: 9.9 MG/DL (ref 8.5–10.5)
CHLORIDE SERPL-SCNC: 102 MMOL/L (ref 95–110)
CO2 SERPL-SCNC: 28 MMOL/L (ref 22–32)
CREAT SERPL-MCNC: 1.37 MG/DL (ref 0.5–1.5)
EOSINOPHIL # BLD: 0 K/UL (ref 0–0.7)
EOSINOPHIL NFR BLD: 0 %
ERYTHROCYTE [DISTWIDTH] IN BLOOD BY AUTOMATED COUNT: 13.2 % (ref 11–15)
GLUCOSE SERPL-MCNC: 89 MG/DL (ref 70–99)
HCT VFR BLD AUTO: 49.5 % (ref 41–52)
HGB BLD-MCNC: 16.2 G/DL (ref 13.5–17.5)
LIPASE SERPL-CCNC: 19 U/L (ref 22–51)
LYMPHOCYTES # BLD: 0.7 K/UL (ref 1–4)
LYMPHOCYTES NFR BLD: 8 %
MCH RBC QN AUTO: 28.5 PG (ref 27–32)
MCHC RBC AUTO-ENTMCNC: 32.8 G/DL (ref 32–37)
MCV RBC AUTO: 87.1 FL (ref 80–100)
MONOCYTES # BLD: 0.4 K/UL (ref 0–1)
MONOCYTES NFR BLD: 5 %
NEUTROPHILS # BLD AUTO: 8.7 K/UL (ref 1.8–7.7)
NEUTROPHILS NFR BLD: 87 %
OSMOLALITY UR CALC.SUM OF ELEC: 292 MOSM/KG (ref 275–295)
PLATELET # BLD AUTO: 191 K/UL (ref 140–400)
PMV BLD AUTO: 9.4 FL (ref 7.4–10.3)
POTASSIUM SERPL-SCNC: 4.4 MMOL/L (ref 3.3–5.1)
PROT SERPL-MCNC: 8.1 G/DL (ref 5.9–8.4)
RBC # BLD AUTO: 5.68 M/UL (ref 4.5–5.9)
SODIUM SERPL-SCNC: 141 MMOL/L (ref 136–144)
TROPONIN I SERPL-MCNC: 0 NG/ML (ref ?–0.03)
WBC # BLD AUTO: 10 K/UL (ref 4–11)

## 2017-08-09 PROCEDURE — 99285 EMERGENCY DEPT VISIT HI MDM: CPT

## 2017-08-09 PROCEDURE — 85025 COMPLETE CBC W/AUTO DIFF WBC: CPT

## 2017-08-09 PROCEDURE — 99213 OFFICE O/P EST LOW 20 MIN: CPT

## 2017-08-09 PROCEDURE — 84484 ASSAY OF TROPONIN QUANT: CPT

## 2017-08-09 PROCEDURE — 80048 BASIC METABOLIC PNL TOTAL CA: CPT

## 2017-08-09 PROCEDURE — 96374 THER/PROPH/DIAG INJ IV PUSH: CPT

## 2017-08-09 PROCEDURE — 74177 CT ABD & PELVIS W/CONTRAST: CPT | Performed by: EMERGENCY MEDICINE

## 2017-08-09 PROCEDURE — 93005 ELECTROCARDIOGRAM TRACING: CPT

## 2017-08-09 PROCEDURE — 71020 XR CHEST PA + LAT CHEST (CPT=71020): CPT

## 2017-08-09 PROCEDURE — 93010 ELECTROCARDIOGRAM REPORT: CPT | Performed by: INTERNAL MEDICINE

## 2017-08-09 PROCEDURE — 99214 OFFICE O/P EST MOD 30 MIN: CPT

## 2017-08-09 PROCEDURE — 80076 HEPATIC FUNCTION PANEL: CPT | Performed by: EMERGENCY MEDICINE

## 2017-08-09 PROCEDURE — 83690 ASSAY OF LIPASE: CPT | Performed by: EMERGENCY MEDICINE

## 2017-08-09 RX ORDER — ONDANSETRON 4 MG/1
4 TABLET, ORALLY DISINTEGRATING ORAL ONCE
Status: COMPLETED | OUTPATIENT
Start: 2017-08-09 | End: 2017-08-09

## 2017-08-09 RX ORDER — ONDANSETRON 4 MG/1
4 TABLET, ORALLY DISINTEGRATING ORAL EVERY 4 HOURS PRN
Qty: 10 TABLET | Refills: 0 | Status: SHIPPED | OUTPATIENT
Start: 2017-08-09 | End: 2017-08-16

## 2017-08-09 RX ORDER — FAMOTIDINE 20 MG/1
20 TABLET ORAL ONCE
Status: COMPLETED | OUTPATIENT
Start: 2017-08-09 | End: 2017-08-09

## 2017-08-09 RX ORDER — ONDANSETRON 2 MG/ML
INJECTION INTRAMUSCULAR; INTRAVENOUS
Status: COMPLETED
Start: 2017-08-09 | End: 2017-08-09

## 2017-08-09 RX ORDER — ONDANSETRON 2 MG/ML
4 INJECTION INTRAMUSCULAR; INTRAVENOUS ONCE
Status: COMPLETED | OUTPATIENT
Start: 2017-08-09 | End: 2017-08-09

## 2017-08-09 RX ORDER — ZIPRASIDONE MESYLATE 20 MG/ML
INJECTION, POWDER, LYOPHILIZED, FOR SOLUTION INTRAMUSCULAR
Status: DISCONTINUED
Start: 2017-08-09 | End: 2017-08-09

## 2017-08-09 NOTE — ED NOTES
Patient complains of epigastric pain since this morning with nausea/vomiting, states he recently started cpap for sleep apnea and heard this may be a side effect, denies actual chest pain, no apparent distress at this time

## 2017-08-09 NOTE — ED PROVIDER NOTES
Patient Seen in: [facility] In [address] [place] [address]    History   Patient presents with:  Abdomen/Flank Pain (GI/)    Stated Complaint: abd pain/nausea/dizzy    HPI    Pt is a 41 yo who presents with a 1 day h/o epigastric pain and belching.  Ozzie Worrell CAD, Cause of death; per    • Other[other] [OTHER] Paternal Grandfather    • Obesity Sister      per    • Heart Disease Sister 37     Congestive heart disease; per    • Other[other] [OTHER] Maternal Grandmother    • Other[other] [OTHER] Maternal Gra Course  ------------------------------------------------------------  MDM       Pt comes with some nausea and epigastric pain x 1 day. Pepcid and Zofran were given. Advised to take Nexium 40 mg daily until sees PCP. Go to the Er if sx worsen.  May use Zofra

## 2017-08-09 NOTE — ED PROVIDER NOTES
Patient Seen in: [facility] AND [facility] Emergency Department    History   Patient presents with:  Abdomen/Flank Pain (GI/)  Chest Pain Angina (cardiovascular)    Stated Complaint: Abd Pain +NVD    HPI    46-year-old male presents the emergency department wi • Obesity Father      per NG   • Hypertension Mother      per NG   • Obesity Mother      per NG   • Thyroid Disorder Mother      per NG   • Heart Disease Paternal Grandfather 79     CAD, Cause of death; per NG   • Other [OTHER] Paternal Grandfather    • There is tenderness (upper abdomen). There is no rebound and no guarding. Musculoskeletal: Normal range of motion. He exhibits no edema or tenderness. Lymphadenopathy:     He has no cervical adenopathy.    Neurological: He is alert and oriented to [facility] Findings c/w enteritis. Discharge home.  Likely viral.       Disposition and Plan     Clinical Impression:  Viral enteritis  (primary encounter diagnosis)    Disposition:  Discharge    Follow-up:  MD Jaguar March 3.

## 2017-08-09 NOTE — TELEPHONE ENCOUNTER
Pt informed of Dr [facility] recommendation below.  Pt voiced understanding but states he was seen at  earlier (documentation visible in chart) and they told him it is possible that the bloating could be r/t gulping too much air from the CPAP; states they advi

## 2017-08-09 NOTE — ED INITIAL ASSESSMENT (HPI)
C/o epigastric pain started this morning. with nausea but no vomiting.  Claims that she started  Cpap 2 days ago

## 2017-08-09 NOTE — TELEPHONE ENCOUNTER
Actions Requested:  MD recommendations for abdominal bloating  Problem: Pt states since he started using CPAP machine a few days ago he had developed abdominal bloating/belching frequently/increased flatus. Pt states abd bloating is uncomfortable.   He maddox or ongoing AND lasting > 4 weeks)    Care Advice Discussed:  * Reassurance  * Expected Course  * Reasons To Call Back      - Abdominal pain is constant and present for more than 2 hours      - Abdominal pains come and go and are present for more than 24 ho

## 2017-08-09 NOTE — TELEPHONE ENCOUNTER
I am not familiar with this side effect. I think we should have him see pulmonary and discuss if settings on CPAP can be adjusted so this problem is minimized.

## 2017-08-10 ENCOUNTER — TELEPHONE (OUTPATIENT)
Dept: FAMILY MEDICINE CLINIC | Facility: CLINIC | Age: 47
End: 2017-08-10

## 2017-08-10 NOTE — TELEPHONE ENCOUNTER
Pt is calling state that when he sleep with the sleep apnea machine some time at night he burp and pass gas a lot state that they inform him that the setting need to be change to auto but a order need to be sent to [facility]   Pt is requesting  Call back

## 2017-08-10 NOTE — H&P
[facility]    PATIENT'S NAME: Winston English   ATTENDING PHYSICIAN: Amberly Malone MD   PATIENT ACCOUNT#:   [de-identified]    LOCATION:  [place] [address]  MEDICAL RECORD #:   [id number]       YOB: 1970  ADMISSION DATE:       08/09/20 range of motion. Supple. No thyromegaly or lymphadenopathy. LUNGS:  Clear to auscultation bilaterally. Normal respiratory effort. No intercostal retractions. HEART:  Regular rate and rhythm. S1 and S2 auscultated. No murmur.    ABDOMEN:  Soft, non

## 2017-08-10 NOTE — TELEPHONE ENCOUNTER
LMTCB. Please transfer to Priority Triage. Noted pt seen in ER and dx with enteritis (likely viral).

## 2017-08-11 NOTE — TELEPHONE ENCOUNTER
Dr. Nora Vargas: Patient seeking DME order for [facility] to state patient will need  \"auto adjusting pressure setting\". F/u call made to patient; He states he spoke to respiratory therapist from [facility].  They suggested he use his cpap machine for 4 hours and

## 2017-08-14 NOTE — TELEPHONE ENCOUNTER
Dr White Headings,    The DME order is pended for review and sign off. Please have your staff @ site fax to [facility] at [phone]. Thank you.

## 2017-08-15 NOTE — TELEPHONE ENCOUNTER
Per phone room, Pt called again and is very upset this hasn't yet been addressed. Faxed/confirmed the order to [facility].

## 2017-08-16 NOTE — TELEPHONE ENCOUNTER
Pt calling checking status of forms to be resent to [facility]. Please call pt when forms have been sent to fax below.

## 2017-08-16 NOTE — TELEPHONE ENCOUNTER
Pt calling states Lobito Renee still does not have forms to change pressure setting on CPAP, pt requesting forms be resent to fax [phone].

## 2017-08-17 ENCOUNTER — OFFICE VISIT (OUTPATIENT)
Dept: INTERNAL MEDICINE CLINIC | Facility: CLINIC | Age: 47
End: 2017-08-17

## 2017-08-17 ENCOUNTER — TELEPHONE (OUTPATIENT)
Dept: PULMONOLOGY | Facility: CLINIC | Age: 47
End: 2017-08-17

## 2017-08-17 VITALS
WEIGHT: 185 LBS | HEART RATE: 71 BPM | BODY MASS INDEX: 24.52 KG/M2 | SYSTOLIC BLOOD PRESSURE: 110 MMHG | DIASTOLIC BLOOD PRESSURE: 82 MMHG | RESPIRATION RATE: 16 BRPM | HEIGHT: 73 IN

## 2017-08-17 DIAGNOSIS — F41.9 ANXIETY: ICD-10-CM

## 2017-08-17 DIAGNOSIS — G47.33 OSA (OBSTRUCTIVE SLEEP APNEA): Primary | ICD-10-CM

## 2017-08-17 DIAGNOSIS — R53.83 FATIGUE, UNSPECIFIED TYPE: ICD-10-CM

## 2017-08-17 PROCEDURE — 99212 OFFICE O/P EST SF 10 MIN: CPT | Performed by: INTERNAL MEDICINE

## 2017-08-17 PROCEDURE — 99214 OFFICE O/P EST MOD 30 MIN: CPT | Performed by: INTERNAL MEDICINE

## 2017-08-17 NOTE — PROGRESS NOTES
Natalee Cat is a 40year old male. HPI:   1. MICHELLE (obstructive sleep apnea)    Has been diagnosed with MICHELLE. Dad and brother are both treated for thos. Has poor sleep as long as he can remember. No energy.  Now feels dizzy and mildly confused the last few m Comment: per CAROLYN  Alcohol use: Yes           0.0 oz/week     Comment: socially - 2 beers/week       REVIEW OF SYSTEMS:   GENERAL HEALTH: feels well otherwise  SKIN: denies any unusual skin lesions or rashes  RESPIRATORY: denies shortness of breath w with psychiatry regularly to monitor your condition. Try to get regular sleep to help with your symptoms. The patient indicates understanding of these issues and agrees to the plan. The patient is asked to return in 3 months.

## 2017-08-17 NOTE — TELEPHONE ENCOUNTER
Dr. Glenroy Bowen called to request that pt see one of the pulmonary doctors sooner than first available. Pt is having problems with bloating that he thinks is due to CPAP. Machine is set at 10 and pt thinks this is too high.   Please call pt with appt and als

## 2017-08-18 NOTE — TELEPHONE ENCOUNTER
He may see me next Thursday. You may schedule him during the first half of the schedule next Thursday or if he is not available next Thursday during the first half the schedule next Friday.

## 2017-08-18 NOTE — TELEPHONE ENCOUNTER
Appt made for 8-24-17 1:30. Pt notified of time date and place of appt. Pt states he uses [facility] for his CPAP machine. Pt notified to get data download from [place] and have results faxed to this office at [phone]. Pt verbalized understanding.

## 2017-08-23 ENCOUNTER — TELEPHONE (OUTPATIENT)
Dept: PULMONOLOGY | Facility: CLINIC | Age: 47
End: 2017-08-23

## 2017-08-23 NOTE — TELEPHONE ENCOUNTER
Pt called to inform RN that [facility] states they faxed over data download on Monday and has also faxed another copy over about 30 minutes ago. Pt is calling to see if office has received fax.  Please call [phone] Thank You

## 2017-08-24 ENCOUNTER — OFFICE VISIT (OUTPATIENT)
Dept: PULMONOLOGY | Facility: CLINIC | Age: 47
End: 2017-08-24

## 2017-08-24 VITALS
SYSTOLIC BLOOD PRESSURE: 127 MMHG | BODY MASS INDEX: 24.81 KG/M2 | OXYGEN SATURATION: 99 % | HEIGHT: 73 IN | HEART RATE: 108 BPM | DIASTOLIC BLOOD PRESSURE: 84 MMHG | RESPIRATION RATE: 18 BRPM | WEIGHT: 187.19 LBS

## 2017-08-24 DIAGNOSIS — G47.33 OSA (OBSTRUCTIVE SLEEP APNEA): Primary | ICD-10-CM

## 2017-08-24 PROCEDURE — 99244 OFF/OP CNSLTJ NEW/EST MOD 40: CPT | Performed by: INTERNAL MEDICINE

## 2017-08-24 PROCEDURE — 99212 OFFICE O/P EST SF 10 MIN: CPT | Performed by: INTERNAL MEDICINE

## 2017-08-24 NOTE — H&P
Referring Physician  Benigno Posada MD    Chief Complaint  Sleep apnea    History of Present Illness  Shunt is a 81-year-old male who presents the pulmonary clinic for initial visit. He admits to underlying history of hypersomnia and fatigue.   Marilyn Disp: 30 tablet Rfl: 0   Mometasone Furoate 50 MCG/ACT Nasal Suspension by Nasal route daily. Disp:  Rfl:    ESOMEPRAZOLE MAGNESIUM 40 MG Oral Capsule Delayed Release TAKE 1 CAPSULE (40 MG TOTAL) BY MOUTH DAILY.  Disp: 90 capsule Rfl: 0   EPINEPHrine (EPIPE ENT.  I am in agreement ENT evaluation given his likely deviated nasal septum and additionally since the patient has been having a difficult time adjusting to CPAP to assess for possible surgical options for treatment of his obstructive sleep apnea.     Rosangela Shaikh

## 2017-08-28 ENCOUNTER — TELEPHONE (OUTPATIENT)
Dept: PULMONOLOGY | Facility: CLINIC | Age: 47
End: 2017-08-28

## 2017-08-28 NOTE — TELEPHONE ENCOUNTER
Pt called to verify that order was sent to [address] [place] [address] for cpap pressure settings change and mask. Please call pt to verify that order was sent.

## 2017-09-05 ENCOUNTER — OFFICE VISIT (OUTPATIENT)
Dept: OTOLARYNGOLOGY | Facility: CLINIC | Age: 47
End: 2017-09-05

## 2017-09-05 VITALS
HEIGHT: 73 IN | SYSTOLIC BLOOD PRESSURE: 98 MMHG | DIASTOLIC BLOOD PRESSURE: 78 MMHG | BODY MASS INDEX: 24.65 KG/M2 | WEIGHT: 186 LBS

## 2017-09-05 DIAGNOSIS — G47.33 OBSTRUCTIVE SLEEP APNEA SYNDROME: ICD-10-CM

## 2017-09-05 DIAGNOSIS — R42 DIZZINESS: Primary | ICD-10-CM

## 2017-09-05 PROCEDURE — 99212 OFFICE O/P EST SF 10 MIN: CPT | Performed by: OTOLARYNGOLOGY

## 2017-09-05 PROCEDURE — 99214 OFFICE O/P EST MOD 30 MIN: CPT | Performed by: OTOLARYNGOLOGY

## 2017-09-05 NOTE — PROGRESS NOTES
Jono Reyes is a 40year old male. Patient presents with:  Consult: mild vertigo ,balance issues   Ringing In Ear: at night   Apnea: possible sinus issues     HPI:   s been experiencing problems with a feeling of balanced sensation.  This has been going on arm, Patient Position: Sitting, Cuff Size: adult)   Ht 6' 1\" (1.854 m)   Wt 186 lb (84.4 kg)   BMI 24.54 kg/m²   System Findings Details   Skin Normal Inspection - Normal.   Constitutional Normal Overall appearance - Normal.   Head/Face Normal Facial feat

## 2017-09-11 NOTE — TELEPHONE ENCOUNTER
Pharmacy called in a refill request for pt regarding medication below. Current Outpatient Prescriptions:   •  ClonazePAM 0.5 MG Oral Tab, Take 1 tablet (0.5 mg total) by mouth nightly as needed for Anxiety. , Disp: 30 tablet, Rfl: 0

## 2017-09-15 RX ORDER — CLONAZEPAM 0.5 MG/1
0.5 TABLET ORAL NIGHTLY PRN
Qty: 30 TABLET | Refills: 0 | OUTPATIENT
Start: 2017-09-15 | End: 2018-01-11

## 2017-09-15 NOTE — TELEPHONE ENCOUNTER
Last refilled on 7-20-17 #30 0RF  rx needs to be phoned in if approved.      Refill Protocol Appointment Criteria  · Appointment scheduled in the past 6 months or in the next 3 months  Recent Outpatient Visits            1 week ago Dizziness    [facility]

## 2017-09-20 ENCOUNTER — TELEPHONE (OUTPATIENT)
Dept: INTERNAL MEDICINE CLINIC | Facility: CLINIC | Age: 47
End: 2017-09-20

## 2017-09-20 DIAGNOSIS — G47.30 SLEEP APNEA IN ADULT: ICD-10-CM

## 2017-09-20 DIAGNOSIS — J34.2 DEVIATED SEPTUM: Primary | ICD-10-CM

## 2017-09-26 ENCOUNTER — OFFICE VISIT (OUTPATIENT)
Dept: AUDIOLOGY | Facility: CLINIC | Age: 47
End: 2017-09-26

## 2017-09-26 DIAGNOSIS — R42 DIZZINESS: Primary | ICD-10-CM

## 2017-09-26 PROCEDURE — 92585 AUDITORY EVOKED POTENTIAL: CPT | Performed by: AUDIOLOGIST

## 2017-09-26 PROCEDURE — 92537 CALORIC VSTBLR TEST W/REC: CPT | Performed by: AUDIOLOGIST

## 2017-09-26 PROCEDURE — 92540 BASIC VESTIBULAR EVALUATION: CPT | Performed by: AUDIOLOGIST

## 2017-09-26 NOTE — PROGRESS NOTES
Videonystagmography   (VNG)    Yojana Lazar is a 40year old male     Referring Provider: Marla Singh   YOB: 1970  Medical Record: [id number]                           History:    Patient noted a four month history of motion sickness that deg/sec  Right cool caloric:  19 deg/sec  Left warm caloric:   11 deg/sec  Right warm caloric:  20  deg/sec     No significant unilateral weakness is present.  (15% in the left ear)  No significant directional preponderance is present (12% to the right)

## 2017-10-03 ENCOUNTER — TELEPHONE (OUTPATIENT)
Dept: OTOLARYNGOLOGY | Facility: CLINIC | Age: 47
End: 2017-10-03

## 2017-10-03 NOTE — TELEPHONE ENCOUNTER
Please inform the patient and his VNG was pretty normal.I would recommend a neurology evaluation to help to further identify andsolve his balance issues

## 2017-10-10 ENCOUNTER — TELEPHONE (OUTPATIENT)
Dept: PULMONOLOGY | Facility: CLINIC | Age: 47
End: 2017-10-10

## 2017-10-10 ENCOUNTER — TELEPHONE (OUTPATIENT)
Dept: INTERNAL MEDICINE CLINIC | Facility: CLINIC | Age: 47
End: 2017-10-10

## 2017-10-10 ENCOUNTER — OFFICE VISIT (OUTPATIENT)
Dept: OTOLARYNGOLOGY | Facility: CLINIC | Age: 47
End: 2017-10-10

## 2017-10-10 VITALS
DIASTOLIC BLOOD PRESSURE: 92 MMHG | WEIGHT: 186 LBS | SYSTOLIC BLOOD PRESSURE: 147 MMHG | HEIGHT: 73 IN | BODY MASS INDEX: 24.65 KG/M2

## 2017-10-10 DIAGNOSIS — G47.33 OBSTRUCTIVE SLEEP APNEA SYNDROME: Primary | ICD-10-CM

## 2017-10-10 DIAGNOSIS — R42 DIZZINESS: Primary | ICD-10-CM

## 2017-10-10 DIAGNOSIS — G47.33 OBSTRUCTIVE SLEEP APNEA SYNDROME: ICD-10-CM

## 2017-10-10 PROCEDURE — 99213 OFFICE O/P EST LOW 20 MIN: CPT | Performed by: OTOLARYNGOLOGY

## 2017-10-10 PROCEDURE — 99212 OFFICE O/P EST SF 10 MIN: CPT | Performed by: OTOLARYNGOLOGY

## 2017-10-10 NOTE — TELEPHONE ENCOUNTER
Pt stts he is having a hard time getting use to using his cpap machine. Pt stts he is compliant and uses the machine every night. Pt ts he has tried several different mask, but none of them have been comfortable.  Pt stts he is a light sleeper and the carey

## 2017-10-10 NOTE — TELEPHONE ENCOUNTER
Patient needs a referral to Dr Patricia Morocho for evaluation for dental sleep appliance. Please sign referral order if you agree. Thank you.

## 2017-10-10 NOTE — PROGRESS NOTES
Amira Gaona is a 40year old male. Patient presents with: Follow - Up: 1 month follow up- dizziness- VNG done on 9/25/17  Follow - Up: 1 month follow up- sleep apnea    HPI:   She continues to feel lightheaded and dizzy at times.  His vestibular testing w Constitutional Normal Overall appearance - Normal.   Head/Face Normal Facial features - Normal. Eyebrows - Normal. Skull - Normal.   Oral/Oropharynx Normal Lips - Normal, Tonsils - 2+ tonsils with very elongated uvula and palate Tongue - Normal    Nasal

## 2017-10-12 ENCOUNTER — OFFICE VISIT (OUTPATIENT)
Dept: PULMONOLOGY | Facility: CLINIC | Age: 47
End: 2017-10-12

## 2017-10-12 VITALS
HEART RATE: 82 BPM | DIASTOLIC BLOOD PRESSURE: 98 MMHG | WEIGHT: 191.25 LBS | HEIGHT: 73 IN | OXYGEN SATURATION: 100 % | RESPIRATION RATE: 18 BRPM | SYSTOLIC BLOOD PRESSURE: 140 MMHG | BODY MASS INDEX: 25.35 KG/M2

## 2017-10-12 DIAGNOSIS — G47.33 OSA (OBSTRUCTIVE SLEEP APNEA): Primary | ICD-10-CM

## 2017-10-12 PROCEDURE — 99213 OFFICE O/P EST LOW 20 MIN: CPT | Performed by: INTERNAL MEDICINE

## 2017-10-12 PROCEDURE — 99212 OFFICE O/P EST SF 10 MIN: CPT | Performed by: INTERNAL MEDICINE

## 2017-10-12 NOTE — PROGRESS NOTES
Referring Physician  Charla Gambino MD    History of Present Illness  Patient is a 42-year-old  male who presents to pulmonary clinic for follow-up visit.   He states that he has attempted to use his CPAP device on a nightly basis which he do difficulty tolerating CPAP device. He continues to use it on a nightly basis. We discussed options for treatment besides CPAP and patient is amenable to trying dental device. I provided him information for dental device.   He also states that he recently

## 2017-10-23 ENCOUNTER — TELEPHONE (OUTPATIENT)
Dept: PULMONOLOGY | Facility: CLINIC | Age: 47
End: 2017-10-23

## 2017-10-23 NOTE — TELEPHONE ENCOUNTER
Pt states that he would like order for oral sleep appliance that was discussed at his last office visit. Please call.

## 2017-11-09 ENCOUNTER — LAB ENCOUNTER (OUTPATIENT)
Dept: LAB | Age: 47
End: 2017-11-09
Attending: INTERNAL MEDICINE
Payer: COMMERCIAL

## 2017-11-09 ENCOUNTER — OFFICE VISIT (OUTPATIENT)
Dept: INTERNAL MEDICINE CLINIC | Facility: CLINIC | Age: 47
End: 2017-11-09

## 2017-11-09 VITALS
TEMPERATURE: 98 F | SYSTOLIC BLOOD PRESSURE: 117 MMHG | WEIGHT: 189 LBS | BODY MASS INDEX: 25.05 KG/M2 | DIASTOLIC BLOOD PRESSURE: 81 MMHG | HEART RATE: 89 BPM | HEIGHT: 73 IN

## 2017-11-09 DIAGNOSIS — Z00.00 ANNUAL PHYSICAL EXAM: Primary | ICD-10-CM

## 2017-11-09 DIAGNOSIS — G47.33 OSA (OBSTRUCTIVE SLEEP APNEA): ICD-10-CM

## 2017-11-09 DIAGNOSIS — R42 DISEQUILIBRIUM: ICD-10-CM

## 2017-11-09 DIAGNOSIS — K21.9 GASTROESOPHAGEAL REFLUX DISEASE WITHOUT ESOPHAGITIS: ICD-10-CM

## 2017-11-09 DIAGNOSIS — Z00.00 ANNUAL PHYSICAL EXAM: ICD-10-CM

## 2017-11-09 PROCEDURE — 85025 COMPLETE CBC W/AUTO DIFF WBC: CPT

## 2017-11-09 PROCEDURE — 81001 URINALYSIS AUTO W/SCOPE: CPT

## 2017-11-09 PROCEDURE — 99396 PREV VISIT EST AGE 40-64: CPT | Performed by: INTERNAL MEDICINE

## 2017-11-09 PROCEDURE — 36415 COLL VENOUS BLD VENIPUNCTURE: CPT

## 2017-11-09 PROCEDURE — 80053 COMPREHEN METABOLIC PANEL: CPT

## 2017-11-09 PROCEDURE — 80061 LIPID PANEL: CPT

## 2017-11-09 PROCEDURE — 84443 ASSAY THYROID STIM HORMONE: CPT

## 2017-11-09 NOTE — PROGRESS NOTES
HPI:    Patient ID: Munir Coleman is a 40year old male. Patient presents today for his annual physical. He still complains of his sensation of off balance or disequilibrium which had been going on since earlier this year after his vacation cruise.  He ha No oropharyngeal exudate. Eyes: Conjunctivae are normal. Pupils are equal, round, and reactive to light. Right eye exhibits no discharge. Left eye exhibits no discharge. No scleral icterus. Neck: Normal range of motion. Neck supple. No JVD present.  No to still have same symptom and could be possible disembarkment syndrome.  He had seen ENT and was told to see neurologist so pt referred to Dr Kelli Virk and partners for eval.     (G47.33) MICHELLE (obstructive sleep apnea)  Plan: pt currently being treated by our

## 2017-12-13 ENCOUNTER — MED REC SCAN ONLY (OUTPATIENT)
Dept: INTERNAL MEDICINE CLINIC | Facility: CLINIC | Age: 47
End: 2017-12-13

## 2018-01-08 ENCOUNTER — OFFICE VISIT (OUTPATIENT)
Dept: INTERNAL MEDICINE CLINIC | Facility: CLINIC | Age: 48
End: 2018-01-08

## 2018-01-08 VITALS
WEIGHT: 199 LBS | HEIGHT: 73 IN | DIASTOLIC BLOOD PRESSURE: 89 MMHG | HEART RATE: 83 BPM | SYSTOLIC BLOOD PRESSURE: 138 MMHG | BODY MASS INDEX: 26.37 KG/M2 | RESPIRATION RATE: 16 BRPM

## 2018-01-08 DIAGNOSIS — F32.9 REACTIVE DEPRESSION: ICD-10-CM

## 2018-01-08 DIAGNOSIS — G47.33 OSA (OBSTRUCTIVE SLEEP APNEA): ICD-10-CM

## 2018-01-08 DIAGNOSIS — R26.89 IMBALANCE: Primary | ICD-10-CM

## 2018-01-08 PROCEDURE — 99214 OFFICE O/P EST MOD 30 MIN: CPT | Performed by: INTERNAL MEDICINE

## 2018-01-08 PROCEDURE — 99212 OFFICE O/P EST SF 10 MIN: CPT | Performed by: INTERNAL MEDICINE

## 2018-01-08 RX ORDER — ESCITALOPRAM OXALATE 10 MG/1
10 TABLET ORAL DAILY
Qty: 30 TABLET | Refills: 1 | Status: SHIPPED | OUTPATIENT
Start: 2018-01-08 | End: 2018-07-19

## 2018-01-08 NOTE — PROGRESS NOTES
Kj Smith is a 56year old male. HPI:   1. Imbalance    Has been having imbalance issues the last few months. Has some anxiety issues and wonders if this is playoing a role. He feels at times like his head is moving when he is not moving.  Drinking make Diagnosis Date   • Allergic rhinitis    • Anxiety    • Blepharitis of both eyes 2010    OU; per NG   • GERD (gastroesophageal reflux disease) 2000    PPI; per NG   • History of pneumonia     per NG   • History of snoring     per NG   • Hx of bee sting al CPAP. Now is trying a dental device from Dr Sandrine Arreola to pull the jaw forward. Unable to sleep well because of the bloating issues. Had CPAP  machine  set at 10 CM to try and help with his increased abdominal pain.  Follow up with Dr Jay Garcia to see if nasal pas

## 2018-01-11 ENCOUNTER — TELEPHONE (OUTPATIENT)
Dept: OTHER | Age: 48
End: 2018-01-11

## 2018-01-11 NOTE — TELEPHONE ENCOUNTER
Spoke with patient and he states Escitalopram that PVR prescribed 1/08/18 \"does not help me at all-I feel down and jittery. I feel out of it-I'm not sleeping well at all. \"    Patient is asking for PVR advice--states he has taken Clonazepam 0.5 mg periodi

## 2018-01-11 NOTE — TELEPHONE ENCOUNTER
Spoke with patient and relayed PVR message below--patient verbalizes understanding and agreement, but states he only has 4 tabs of Clonazepam left and is requesting short term supply.      [facility] also [facility]--patient states he saw

## 2018-01-11 NOTE — TELEPHONE ENCOUNTER
Can use clonazepam daily for anxiety and to help sleep short term but would not want it to be used chronically.  May need psych eval or counselling if symptoms persist.

## 2018-03-20 ENCOUNTER — OFFICE VISIT (OUTPATIENT)
Dept: OTOLARYNGOLOGY | Facility: CLINIC | Age: 48
End: 2018-03-20

## 2018-03-20 VITALS — SYSTOLIC BLOOD PRESSURE: 139 MMHG | HEART RATE: 80 BPM | DIASTOLIC BLOOD PRESSURE: 94 MMHG

## 2018-03-20 DIAGNOSIS — J34.2 DEVIATED SEPTUM: Primary | ICD-10-CM

## 2018-03-20 DIAGNOSIS — J32.9 CHRONIC SINUSITIS, UNSPECIFIED LOCATION: ICD-10-CM

## 2018-03-20 PROCEDURE — 99213 OFFICE O/P EST LOW 20 MIN: CPT | Performed by: OTOLARYNGOLOGY

## 2018-03-20 PROCEDURE — 99212 OFFICE O/P EST SF 10 MIN: CPT | Performed by: OTOLARYNGOLOGY

## 2018-03-21 NOTE — PROGRESS NOTES
Sukh Brunnerkeeper is a 56year old male.  Patient presents with:  Obstructive Sleep Apnea (MICHELLE): pt seen by sleep specialist and advised to be by ENT,   Sinus Problem: pt has frequent sinus headaches at least monthly, pt states at time he feels like he cannot br oz/week     Comment: socially - 2 beers/week       REVIEW OF SYSTEMS:   GENERAL HEALTH: feels well otherwise  GENERAL : denies fever, chills, sweats, weight loss, weight gain  SKIN: denies any unusual skin lesions or rashes  RESPIRATORY: denies shortness o MD  3/21/2018  5:08 AM

## 2018-03-30 ENCOUNTER — TELEPHONE (OUTPATIENT)
Dept: OTOLARYNGOLOGY | Facility: CLINIC | Age: 48
End: 2018-03-30

## 2018-03-30 NOTE — TELEPHONE ENCOUNTER
I received a call from Benita Taveras at [facility], [place] [address] right side denied and CPT 00397 right side denied due to imaging not showing chronic sinusitis. CPT 43211 has been approved for the left side only. CPT 26619 and 98863 did not require authorization.

## 2018-04-04 NOTE — TELEPHONE ENCOUNTER
Spoke to pt regarding information if his upcoming surgery. Will call back with more information on Tuesday if we will move forward with surgery on 4/12/18.

## 2018-04-12 ENCOUNTER — LAB REQUISITION (OUTPATIENT)
Dept: LAB | Facility: HOSPITAL | Age: 48
End: 2018-04-12
Payer: COMMERCIAL

## 2018-04-12 DIAGNOSIS — Z01.89 ENCOUNTER FOR OTHER SPECIFIED SPECIAL EXAMINATIONS: ICD-10-CM

## 2018-04-12 PROCEDURE — 88305 TISSUE EXAM BY PATHOLOGIST: CPT | Performed by: OTOLARYNGOLOGY

## 2018-04-13 ENCOUNTER — TELEPHONE (OUTPATIENT)
Dept: OTOLARYNGOLOGY | Facility: CLINIC | Age: 48
End: 2018-04-13

## 2018-04-13 NOTE — TELEPHONE ENCOUNTER
Pt is post op day 1 endo maxillary with tissue removal, endo total ethmoidectomy,  , septoplasty, SMR.  Per  Pt pt is doing well no bleeding, advised pt no bending or heavy lifting for the next week and pt is not to blow nose until seen by  and pt is not

## 2018-04-14 ENCOUNTER — TELEPHONE (OUTPATIENT)
Dept: OTOLARYNGOLOGY | Facility: CLINIC | Age: 48
End: 2018-04-14

## 2018-04-14 NOTE — TELEPHONE ENCOUNTER
Pt had surgery yesterday states he is having body aches would like to know if this is normal following surgery. Please call. Thank you.

## 2018-04-14 NOTE — TELEPHONE ENCOUNTER
Pt informed the body aches he is experiencing post op is normal and will subside with time. Pt also states he is unable to breathe clearly through his R nostril; still has congestion.   Informed pt this is also normal; he should just continue using the sal

## 2018-04-19 ENCOUNTER — OFFICE VISIT (OUTPATIENT)
Dept: OTOLARYNGOLOGY | Facility: CLINIC | Age: 48
End: 2018-04-19

## 2018-04-19 VITALS
TEMPERATURE: 97 F | HEIGHT: 73 IN | SYSTOLIC BLOOD PRESSURE: 133 MMHG | WEIGHT: 195 LBS | DIASTOLIC BLOOD PRESSURE: 91 MMHG | BODY MASS INDEX: 25.84 KG/M2

## 2018-04-19 DIAGNOSIS — J34.2 DEVIATED SEPTUM: Primary | ICD-10-CM

## 2018-04-19 PROCEDURE — 99024 POSTOP FOLLOW-UP VISIT: CPT | Performed by: OTOLARYNGOLOGY

## 2018-04-19 PROCEDURE — 99212 OFFICE O/P EST SF 10 MIN: CPT | Performed by: OTOLARYNGOLOGY

## 2018-04-19 RX ORDER — CEPHALEXIN 500 MG/1
CAPSULE ORAL
COMMUNITY
Start: 2018-04-12 | End: 2018-04-19 | Stop reason: ALTCHOICE

## 2018-04-19 RX ORDER — ACETAMINOPHEN AND CODEINE PHOSPHATE 300; 30 MG/1; MG/1
TABLET ORAL
Refills: 0 | COMMUNITY
Start: 2018-04-12 | End: 2018-07-19

## 2018-04-19 NOTE — PROGRESS NOTES
He is in follow-up of a septoplasty and endoscopic sinus surgery. He still very congested    Exam:  Nasal splints removed. Debris cleared from sinus passages and nasal cavities. Assessment/plan:  Well following his surgery.   Start sinus irrigation and

## 2018-04-20 ENCOUNTER — TELEPHONE (OUTPATIENT)
Dept: OTOLARYNGOLOGY | Facility: CLINIC | Age: 48
End: 2018-04-20

## 2018-04-20 NOTE — TELEPHONE ENCOUNTER
Pt informed per  it is normal to have the congestion after procedure; there is irritation and pt had allergies; pt should continue sinus irrigation, may use antihistamine at this time, and may restart using Flonase next week.   Pt verbalized understandin

## 2018-04-20 NOTE — TELEPHONE ENCOUNTER
Patient states he just had his post op. states when plug was removed he was able to breathe right after. States last night he experienced congestion again. Would like to know if this is normal. Please call. Thank you.

## 2018-04-30 ENCOUNTER — TELEPHONE (OUTPATIENT)
Dept: OTOLARYNGOLOGY | Facility: CLINIC | Age: 48
End: 2018-04-30

## 2018-04-30 NOTE — TELEPHONE ENCOUNTER
This is still not terribly unusual. Could have some allergy component making things still not too good. I do think he can resume his flonase. Continue the sinus irrigation too.  I would recommend giving some more time and follow up in another couple of week

## 2018-04-30 NOTE — TELEPHONE ENCOUNTER
please see note below, pt states he feels very congested and feels like he cannot breath out of his nose especially at night and was one of the reasons he did the surgery. Pt stats he has been using OTD Claritin D daily and irrigations daily.  Pt a

## 2018-04-30 NOTE — TELEPHONE ENCOUNTER
pt called. Post op 4/12/18, still has trouble with breathing from nose,  pt is asking What else can he do. Thank you.

## 2018-05-23 ENCOUNTER — OFFICE VISIT (OUTPATIENT)
Dept: INTERNAL MEDICINE CLINIC | Facility: CLINIC | Age: 48
End: 2018-05-23

## 2018-05-23 VITALS
HEIGHT: 73 IN | OXYGEN SATURATION: 99 % | BODY MASS INDEX: 26.11 KG/M2 | SYSTOLIC BLOOD PRESSURE: 120 MMHG | TEMPERATURE: 98 F | HEART RATE: 91 BPM | DIASTOLIC BLOOD PRESSURE: 88 MMHG | WEIGHT: 197 LBS

## 2018-05-23 DIAGNOSIS — N48.89 PENILE PAIN: Primary | ICD-10-CM

## 2018-05-23 PROBLEM — R07.9 CHEST PAIN: Status: RESOLVED | Noted: 2017-01-10 | Resolved: 2018-05-23

## 2018-05-23 PROCEDURE — 81002 URINALYSIS NONAUTO W/O SCOPE: CPT | Performed by: INTERNAL MEDICINE

## 2018-05-23 PROCEDURE — 99213 OFFICE O/P EST LOW 20 MIN: CPT | Performed by: INTERNAL MEDICINE

## 2018-05-23 PROCEDURE — 99214 OFFICE O/P EST MOD 30 MIN: CPT | Performed by: INTERNAL MEDICINE

## 2018-05-23 RX ORDER — FLUTICASONE PROPIONATE 50 MCG
SPRAY, SUSPENSION (ML) NASAL DAILY
COMMUNITY
End: 2018-07-19

## 2018-05-23 RX ORDER — LORATADINE 10 MG/1
10 TABLET ORAL DAILY
COMMUNITY

## 2018-05-23 NOTE — PROGRESS NOTES
HPI:    Patient ID: Hieu Mario is a 56year old male. Penis/Scrotum Problem   The patient's pertinent negatives include no genital injury, genital lesions, pelvic pain, penile discharge, penile pain, scrotal swelling or testicular pain.  Primary sympto 1   Mometasone Furoate 50 MCG/ACT Nasal Suspension by Nasal route daily. Disp:  Rfl:      Allergies:  Bee                         Comment:Other reaction(s): Anaphylaxis, throat clouser  Beeswax                     Comment:Other reaction(s):  Anaphylaxis,thr UROLOGY - INTERNAL, URINALYSIS NONAUTO W/O         SCOPE        Pt doesn't really have urninary symptoms and physical exam of penis/scrotum essentially normal. We did urine dip and was normal. Observe for now, if persist, will need to see urologist Dr Dai Mast

## 2018-05-24 ENCOUNTER — TELEPHONE (OUTPATIENT)
Dept: SURGERY | Facility: CLINIC | Age: 48
End: 2018-05-24

## 2018-05-24 NOTE — TELEPHONE ENCOUNTER
Former SILVINOB pt c/o spasms during urination- always having the feeling to go but he cannot - for a week - very painful - asking for sooner than fist available

## 2018-05-25 ENCOUNTER — TELEPHONE (OUTPATIENT)
Dept: OTHER | Age: 48
End: 2018-05-25

## 2018-05-25 NOTE — TELEPHONE ENCOUNTER
I called pt back and informed him that WE need to rearrange the schd for PVK and he would like to know if he could make his appt later in the day at 4:40 pm instead of 1 pm and pt agreed and I changed the appt in the schd.

## 2018-05-25 NOTE — TELEPHONE ENCOUNTER
Called patient - verified patient's name and  - informed pt of doctor's note - patient verbalized understanding.  Pt does have appt with urology for Tuesday

## 2018-05-25 NOTE — TELEPHONE ENCOUNTER
I spoke with pt and determined that he has pain at level 5 in the head of the penis which feels like a tight muscle spasm. States that this occurs frequently but intermittently and it keeps him awake at night also because of the frequency.  Denies pain or b

## 2018-05-25 NOTE — TELEPHONE ENCOUNTER
Spoke with pt again and asked if he could come in at 11:40 am now as PVK wanted to rearrange the scd again. He stated that this appt is actually better for him.

## 2018-05-25 NOTE — TELEPHONE ENCOUNTER
Pt called stating pt has left a message and did not receive a call back. Pt saw his pcp and was referred to see dr Elsi Reynolds for spasms in the tip of the penis. Painful and pressure. Pt declined to schedule first available consults in June.   Pt requesting a

## 2018-05-25 NOTE — TELEPHONE ENCOUNTER
Patient calling and states that he seen Dr Anahi Denise last 5/23/18 and was referred to Urologists, states that he's been calling the urologist x3 but keep telling him that a nurse will call him back but with no response, now symptoms getting worse, head of

## 2018-05-29 ENCOUNTER — TELEPHONE (OUTPATIENT)
Dept: SURGERY | Facility: CLINIC | Age: 48
End: 2018-05-29

## 2018-05-29 ENCOUNTER — OFFICE VISIT (OUTPATIENT)
Dept: SURGERY | Facility: CLINIC | Age: 48
End: 2018-05-29

## 2018-05-29 VITALS
RESPIRATION RATE: 16 BRPM | TEMPERATURE: 98 F | DIASTOLIC BLOOD PRESSURE: 89 MMHG | WEIGHT: 195 LBS | HEART RATE: 78 BPM | BODY MASS INDEX: 25.84 KG/M2 | HEIGHT: 73 IN | SYSTOLIC BLOOD PRESSURE: 133 MMHG

## 2018-05-29 DIAGNOSIS — N48.89 PENILE PAIN: Primary | ICD-10-CM

## 2018-05-29 DIAGNOSIS — R35.1 NOCTURIA: ICD-10-CM

## 2018-05-29 DIAGNOSIS — R10.2 PELVIC PAIN: ICD-10-CM

## 2018-05-29 DIAGNOSIS — R39.89 PAIN IN URETHRA: ICD-10-CM

## 2018-05-29 PROCEDURE — 99212 OFFICE O/P EST SF 10 MIN: CPT | Performed by: UROLOGY

## 2018-05-29 PROCEDURE — 99204 OFFICE O/P NEW MOD 45 MIN: CPT | Performed by: UROLOGY

## 2018-05-29 NOTE — TELEPHONE ENCOUNTER
Patient seen in office today. Patient scheduled for cysto. Patient asking if his symptoms could be \"STD\" related? Per Dr. Hoang Beck,; call patient to inform him that his symptoms are not STD related. Called patient and relayed Dr. Saroj Nazario' [address]. Patient verbalized understanding.

## 2018-05-29 NOTE — PROGRESS NOTES
Natalee Cat is a 56year old male. HPI:   Patient presents with: Other: penile pain 1 1/2 weeks now    History provided by pt. Referred by Dr. Nahid Walker.    Penile Pain  Onset about 05/19/2018. Location is the head of the penis.  Described by the pt a with Dr. Zeynep Chester = Riley Jose for follow up of visit 08/29/2011 for persistent LUTS (isolated post defecation dribbling); noted at last visit to have microhematuria; urine culture at last visit negative; repeat UA today notable for small blood and will be sent History   Problem Relation Age of Onset   • Diabetes Father 79     per NG   • Obesity Father      per NG   • Hypertension Mother      per NG   • Obesity Mother      per NG   • Thyroid Disorder Mother      per NG   • Heart Disorder Mother    • Heart Disease Genitourinary:  Positive for penile pain and mild dysuria. Negative for hematuria. Gastrointestinal: Positive for heartburn.  Negative for abdominal pain, constipation, decreased appetite, diarrhea and vomiting    Neurological:  Negative for gait distu and no orchitis or epididymitis. EPIDIDYMIS  normal     SCROTUM normal  URETHRAL MEATUS normal.    PENIS circumcised and non tender  Perineum unremarkable. Normal anus, normal rectal tone, no rectal masses. Seminal vesicles are nonpalpable.     STOOL I CT imaging for now due to prior CT being less than one year old.  I fully explained to patient the benefits, risks, complications, side effects, reasons for, nature of, alternatives of submitting urine for analysis and cytology vs transrectal US of the pros CT of the abdomen and pelvis August 2017 which is about 9 months ago. Elder Foster  is a very pleasant patient and I thank you for sending the patient to see me. I will do my best to keep you informed of  all significant urological developments.   Richardson Goodpasture

## 2018-05-29 NOTE — PATIENT INSTRUCTIONS
1. Urine   for complete urinalysis and urine for cytology    2. Transrectal ultrasound of the prostate--look for any abnormalities of the prostate or seminal vesicles that could be causing referred pain    3.    Office cystoscopy possible biopsy under

## 2018-05-31 ENCOUNTER — TELEPHONE (OUTPATIENT)
Dept: SURGERY | Facility: CLINIC | Age: 48
End: 2018-05-31

## 2018-05-31 NOTE — TELEPHONE ENCOUNTER
Spoke with pt and determined that he has to complete his urine tests at [facility] for INS purposes and was given some specific instructions on collection of the urine cytology were involved and he wanted to know if he could collect it at night and then keep

## 2018-06-08 ENCOUNTER — TELEPHONE (OUTPATIENT)
Dept: SURGERY | Facility: CLINIC | Age: 48
End: 2018-06-08

## 2018-06-08 NOTE — TELEPHONE ENCOUNTER
I tried to reach pt and LMTCB that I only see 2 future oders that PVK made for him to do before his next visit and there is a UA that Dr. Yesy Salmeron ordered that he would need to call their office for that result, other than that he should c/b to clarify.

## 2018-06-08 NOTE — TELEPHONE ENCOUNTER
I spoke with pt and he informed me that the UA and cytology were done at [facility] and he neglected to inform of that when he called and he stated that he asked them to please fax those results to us and we should have them already.  I told him that I will re

## 2018-06-10 ENCOUNTER — TELEPHONE (OUTPATIENT)
Dept: SURGERY | Facility: CLINIC | Age: 48
End: 2018-06-10

## 2018-06-10 NOTE — TELEPHONE ENCOUNTER
Nurses, please call patient and  leave a message for him to check his \"my chart\" messages, because I sent him the following by means of \"my chart\" =    \" Jacob Montesinos,  On 6/8/18 Nima James fax results to  our office as follows =     6/1/18 urine for cytology te

## 2018-06-11 ENCOUNTER — TELEPHONE (OUTPATIENT)
Dept: SURGERY | Facility: CLINIC | Age: 48
End: 2018-06-11

## 2018-06-11 NOTE — TELEPHONE ENCOUNTER
Please call patient back; antibiotic would not be appropriate here since urinalysis urine test was normal with no signs of infection. Recommend applying over-the-counter clotrimazole cream to the area 2-3 times daily.   I F  patient continues to have addit

## 2018-06-11 NOTE — TELEPHONE ENCOUNTER
I spoke with pt and he did get PVK's msg in my chart and he states that he is still having some mild sensitivity at the tip of the penis around the urinary meatus and he states that it looks mildly reddened and irritated.  He also has some low back pain and

## 2018-06-11 NOTE — TELEPHONE ENCOUNTER
I called [facility] to request a PA for pt's Prostate US and spoke with Mr. Ivon Thomas and gave the CPT code of the Prostate US and he informed that a PA is not required. I called pt back to inform him of this.

## 2018-06-12 NOTE — TELEPHONE ENCOUNTER
Phoned pt and spoke with him. Read to him 's response as outlined below in this encounter. Pt verbalized understanding, agrees to plan, and is thankful.

## 2018-07-02 ENCOUNTER — OFFICE VISIT (OUTPATIENT)
Dept: SURGERY | Facility: CLINIC | Age: 48
End: 2018-07-02

## 2018-07-02 VITALS
RESPIRATION RATE: 16 BRPM | HEIGHT: 73 IN | WEIGHT: 200 LBS | TEMPERATURE: 98 F | HEART RATE: 82 BPM | BODY MASS INDEX: 26.51 KG/M2 | DIASTOLIC BLOOD PRESSURE: 90 MMHG | SYSTOLIC BLOOD PRESSURE: 138 MMHG

## 2018-07-02 DIAGNOSIS — N48.89 PENILE PAIN: Primary | ICD-10-CM

## 2018-07-02 DIAGNOSIS — N32.89 BLADDER TRABECULATION: ICD-10-CM

## 2018-07-02 DIAGNOSIS — R10.2 PELVIC PAIN: ICD-10-CM

## 2018-07-02 DIAGNOSIS — R35.1 NOCTURIA: ICD-10-CM

## 2018-07-02 PROCEDURE — 99212 OFFICE O/P EST SF 10 MIN: CPT | Performed by: UROLOGY

## 2018-07-02 PROCEDURE — 52000 CYSTOURETHROSCOPY: CPT | Performed by: UROLOGY

## 2018-07-02 RX ORDER — CIPROFLOXACIN 500 MG/1
500 TABLET, FILM COATED ORAL ONCE
Status: COMPLETED | OUTPATIENT
Start: 2018-07-02 | End: 2018-07-02

## 2018-07-02 RX ADMIN — CIPROFLOXACIN 500 MG: 500 TABLET, FILM COATED ORAL at 11:55:00

## 2018-07-02 NOTE — TELEPHONE ENCOUNTER
Pt checking status on letter. Pls call [phone], with update. Thank you. Unable to consent due to medical condition

## 2018-07-02 NOTE — PATIENT INSTRUCTIONS
1.  If you have burning with urination, please buy \"AZO\" over the counter medication for burning - take 1 tablet every 8 hours as needed for burning pain. It makes the color of the urine bright orange red.     2.   At this point in time, my suspicion is t

## 2018-07-02 NOTE — PROGRESS NOTES
PREOPERATIVE DIAGNOSIS:     Penile pain     POSTOP DIAGNOSIS:               The same    PROCEDURE:              Cystoscopy              ANESTHESIA:     2% Xylocaine jelly local;  also see above    FINDINGS:  Strictures of  the urethra:   None  Length  pros spasms. There is no associated mild dysuria. Pt denies weakening of urination. Pt feels the problem is getting worse. Pt states the pain starts after intercourse and after moving heavy boxes.  There was no pain during the intercourse itself and no unusual t injury; pain on glans penis; sexually active (monogamous with wife); no history of chlamydia or gonorrhea;  Pt doesn't really have urinary symptoms and physical exam of penis/scrotum essentially normal; see urologist.\"  05/29/2018 office consult with me; Carlos Laboy physiatry or pelvic floor physical therapist Mee Freitas.  I fully explained to patient the benefits, risks, complications, side effects, reasons for, nature of, alternatives to the above treatment and I answered questions on treatment; patient understands all of direction and in the presence of Zachary Castorena MD.   Electronically Signed: Shahram Muñoz, 7/2/2018, 10:40 AM.      I, Zachary Castorena MD,  personally performed the services described in this documentation.  All medical record entries made by the

## 2018-07-19 ENCOUNTER — OFFICE VISIT (OUTPATIENT)
Dept: INTERNAL MEDICINE CLINIC | Facility: CLINIC | Age: 48
End: 2018-07-19
Payer: COMMERCIAL

## 2018-07-19 VITALS
WEIGHT: 194 LBS | RESPIRATION RATE: 16 BRPM | DIASTOLIC BLOOD PRESSURE: 84 MMHG | SYSTOLIC BLOOD PRESSURE: 130 MMHG | BODY MASS INDEX: 25.71 KG/M2 | HEART RATE: 104 BPM | HEIGHT: 73 IN

## 2018-07-19 DIAGNOSIS — K21.9 GASTROESOPHAGEAL REFLUX DISEASE WITHOUT ESOPHAGITIS: Primary | ICD-10-CM

## 2018-07-19 DIAGNOSIS — G47.33 OSA (OBSTRUCTIVE SLEEP APNEA): ICD-10-CM

## 2018-07-19 PROCEDURE — 99212 OFFICE O/P EST SF 10 MIN: CPT | Performed by: INTERNAL MEDICINE

## 2018-07-19 PROCEDURE — 99214 OFFICE O/P EST MOD 30 MIN: CPT | Performed by: INTERNAL MEDICINE

## 2018-07-19 NOTE — PROGRESS NOTES
Mik Love is a 56year old male. HPI:   1. Gastroesophageal reflux disease without esophagitis    Has some reflux symptoms and takes PPI as needed to help with symptoms.  Not wearing tight clothing, not eating before bedtime and elevating the head of th per CAROLYN      Social History:  Smoking status: Never Smoker                                                              Smokeless tobacco: Never Used                      Comment: per NG  Alcohol use: Yes           0.0 oz/week     Comment: socially - 2 beer parameters on a nitely basis. See ENT for folllow-up and then schedule a sleep study. Will refer to Dr Brooklynn Soto for evaluation and get opinion on when a repeat sleep study should be done. Needs to get a sleep test done with an oral appliance.      The patient

## 2018-10-31 ENCOUNTER — OFFICE VISIT (OUTPATIENT)
Dept: INTERNAL MEDICINE CLINIC | Facility: CLINIC | Age: 48
End: 2018-10-31
Payer: COMMERCIAL

## 2018-10-31 VITALS
RESPIRATION RATE: 16 BRPM | BODY MASS INDEX: 25.45 KG/M2 | HEIGHT: 73 IN | SYSTOLIC BLOOD PRESSURE: 138 MMHG | DIASTOLIC BLOOD PRESSURE: 88 MMHG | WEIGHT: 192 LBS | HEART RATE: 79 BPM

## 2018-10-31 DIAGNOSIS — K21.9 GASTROESOPHAGEAL REFLUX DISEASE WITHOUT ESOPHAGITIS: Primary | ICD-10-CM

## 2018-10-31 DIAGNOSIS — F41.9 ANXIETY: ICD-10-CM

## 2018-10-31 DIAGNOSIS — G47.33 OSA (OBSTRUCTIVE SLEEP APNEA): ICD-10-CM

## 2018-10-31 PROCEDURE — 99214 OFFICE O/P EST MOD 30 MIN: CPT | Performed by: INTERNAL MEDICINE

## 2018-10-31 PROCEDURE — 99212 OFFICE O/P EST SF 10 MIN: CPT | Performed by: INTERNAL MEDICINE

## 2018-10-31 RX ORDER — CLONAZEPAM 0.5 MG/1
0.5 TABLET ORAL NIGHTLY PRN
Qty: 60 TABLET | Refills: 0 | Status: SHIPPED | OUTPATIENT
Start: 2018-10-31 | End: 2019-07-11

## 2018-10-31 NOTE — PROGRESS NOTES
Erwin Steen is a 56year old male. HPI:   1. Stomach discomfort    Has been having increased reflux symptoms and takes PPI as needed to help with symptoms.  Not wearing tight clothing, not eating before bedtime and elevating the head of the bed have been History of pneumonia     per NG   • History of snoring     per NG   • Hx of bee sting allergy 1978    Epi-pen; per NG   • Hx of hiatal hernia 2005    per NG   • Myopia of both eyes with astigmatism 2010    OU; per NG   • Prostatitis     per NG   • Sinus pr lying down after eating a meal  Avoid NSAIDs alike advil, motrin, aleve and naprosyn  Avoid caffeine, alcohol and tobacco use OTC H2 blocjkers( like pepcid/famotidine, zantac/ranidine) and /or antacids  Can use PPIs if needed like pantaprozole (protonix,)

## 2018-11-27 ENCOUNTER — OFFICE VISIT (OUTPATIENT)
Dept: INTERNAL MEDICINE CLINIC | Facility: CLINIC | Age: 48
End: 2018-11-27
Payer: COMMERCIAL

## 2018-11-27 VITALS
BODY MASS INDEX: 26.24 KG/M2 | RESPIRATION RATE: 16 BRPM | WEIGHT: 198 LBS | HEIGHT: 73 IN | DIASTOLIC BLOOD PRESSURE: 70 MMHG | HEART RATE: 70 BPM | SYSTOLIC BLOOD PRESSURE: 124 MMHG

## 2018-11-27 DIAGNOSIS — F41.9 ANXIETY: ICD-10-CM

## 2018-11-27 DIAGNOSIS — K21.9 GASTROESOPHAGEAL REFLUX DISEASE WITHOUT ESOPHAGITIS: Primary | ICD-10-CM

## 2018-11-27 DIAGNOSIS — R10.30 LOWER ABDOMINAL PAIN: ICD-10-CM

## 2018-11-27 PROCEDURE — 99214 OFFICE O/P EST MOD 30 MIN: CPT | Performed by: INTERNAL MEDICINE

## 2018-11-27 PROCEDURE — 99212 OFFICE O/P EST SF 10 MIN: CPT | Performed by: INTERNAL MEDICINE

## 2018-11-27 NOTE — PROGRESS NOTES
Rodrigo Daniel is a 56year old male. HPI:   1.  Gastroesophageal reflux disease without esophagitis    Keep the head of the bed raised  Avoid tight fitting clothes  Work on losing weight  Avoid eating late at night and lying down after eating a meal  Avoid Anxiety    • Blepharitis of both eyes 2010    OU; per NG   • GERD (gastroesophageal reflux disease) 2000    PPI; per NG   • History of pneumonia     per NG   • History of snoring     per NG   • Hx of bee sting allergy 1978    Epi-pen; per NG   • Hx of hiat ago  Will add paxil 10 mg daily as anxiety seems to be playing a role in his symptoms. Use clonazepam only as needed while on paxil.  ( can start with 5 mg daily)    Keep the head of the bed raised  Avoid tight fitting clothes  Work on losing weight  Avoid

## 2018-12-11 ENCOUNTER — OFFICE VISIT (OUTPATIENT)
Dept: INTERNAL MEDICINE CLINIC | Facility: CLINIC | Age: 48
End: 2018-12-11
Payer: COMMERCIAL

## 2018-12-11 VITALS
BODY MASS INDEX: 25.98 KG/M2 | TEMPERATURE: 98 F | HEIGHT: 73 IN | RESPIRATION RATE: 16 BRPM | DIASTOLIC BLOOD PRESSURE: 72 MMHG | HEART RATE: 85 BPM | SYSTOLIC BLOOD PRESSURE: 138 MMHG | WEIGHT: 196 LBS

## 2018-12-11 DIAGNOSIS — K21.9 GASTROESOPHAGEAL REFLUX DISEASE WITHOUT ESOPHAGITIS: ICD-10-CM

## 2018-12-11 DIAGNOSIS — F41.9 ANXIETY: ICD-10-CM

## 2018-12-11 DIAGNOSIS — R10.10 UPPER ABDOMINAL PAIN: Primary | ICD-10-CM

## 2018-12-11 PROCEDURE — 99212 OFFICE O/P EST SF 10 MIN: CPT | Performed by: INTERNAL MEDICINE

## 2018-12-11 PROCEDURE — 99214 OFFICE O/P EST MOD 30 MIN: CPT | Performed by: INTERNAL MEDICINE

## 2018-12-11 RX ORDER — PAROXETINE 10 MG/1
TABLET, FILM COATED ORAL
Refills: 3 | COMMUNITY
Start: 2018-11-27 | End: 2019-03-13

## 2018-12-11 NOTE — PROGRESS NOTES
James Ch is a 56year old male. HPI:   1. Gastroesophageal reflux disease without esophagitis    Has a  dull like pain that feels like \"fireworks go off in his stomach\" Does not seem to be food related. But yogurt seems to make his stomach go crazy. for Anxiety. Disp: 60 tablet Rfl: 0   loratadine (CLARITIN) 10 MG Oral Tab Take 10 mg by mouth daily. Disp:  Rfl:    ESOMEPRAZOLE MAGNESIUM 40 MG Oral Capsule Delayed Release TAKE 1 CAPSULE (40 MG TOTAL) BY MOUTH DAILY.  Disp: 90 capsule Rfl: 0   EPINEPHrin of the bed 4 inches or so. Avoid tight undergarments. Do not eat for 2-3 hours before going to bed. Take prilosec  OTC or another PPI daily. Call if symptoms do not resolve in 14 days for possible further testing. Cut down on green leafy vegetables.  Take n plan.    The patient is asked to return in 3 months.

## 2018-12-18 ENCOUNTER — TELEPHONE (OUTPATIENT)
Dept: INTERNAL MEDICINE CLINIC | Facility: CLINIC | Age: 48
End: 2018-12-18

## 2018-12-20 NOTE — TELEPHONE ENCOUNTER
Pt called in to confirm we received results. CSS confirmed with Pt. Pt asking if he could hear something before the holidays regarding the results, either phone call or MyChart. Please advise.

## 2018-12-24 ENCOUNTER — HOSPITAL ENCOUNTER (OUTPATIENT)
Dept: ULTRASOUND IMAGING | Age: 48
Discharge: HOME | End: 2018-12-24
Attending: INTERNAL MEDICINE
Payer: COMMERCIAL

## 2018-12-24 DIAGNOSIS — R10.10 UPPER ABDOMINAL PAIN: ICD-10-CM

## 2018-12-24 PROCEDURE — 76700 US EXAM ABDOM COMPLETE: CPT | Performed by: INTERNAL MEDICINE

## 2018-12-26 ENCOUNTER — TELEPHONE (OUTPATIENT)
Dept: GASTROENTEROLOGY | Facility: CLINIC | Age: 48
End: 2018-12-26

## 2018-12-27 NOTE — TELEPHONE ENCOUNTER
Dr Eben George, pt has consult to see you 1/31 and we received faxed [facility] results. Placed on your desk. Did not know if you wanted to save for appt or send to scan. Thanks.

## 2019-01-17 ENCOUNTER — MED REC SCAN ONLY (OUTPATIENT)
Dept: INTERNAL MEDICINE CLINIC | Facility: CLINIC | Age: 49
End: 2019-01-17

## 2019-01-31 ENCOUNTER — OFFICE VISIT (OUTPATIENT)
Dept: GASTROENTEROLOGY | Facility: CLINIC | Age: 49
End: 2019-01-31
Payer: COMMERCIAL

## 2019-01-31 VITALS
HEIGHT: 73 IN | WEIGHT: 197 LBS | BODY MASS INDEX: 26.11 KG/M2 | HEART RATE: 75 BPM | SYSTOLIC BLOOD PRESSURE: 171 MMHG | DIASTOLIC BLOOD PRESSURE: 101 MMHG

## 2019-01-31 DIAGNOSIS — R10.10 UPPER ABDOMINAL PAIN: Primary | ICD-10-CM

## 2019-01-31 PROCEDURE — 99212 OFFICE O/P EST SF 10 MIN: CPT | Performed by: INTERNAL MEDICINE

## 2019-01-31 PROCEDURE — 99213 OFFICE O/P EST LOW 20 MIN: CPT | Performed by: INTERNAL MEDICINE

## 2019-01-31 NOTE — PATIENT INSTRUCTIONS
Abdominal symptoms - likely functional   - consider trial of IBguard as needed  - call Dr. Katharine Reyez to restart on Paxil   - call my office to let me know how you are doing

## 2019-01-31 NOTE — PROGRESS NOTES
Lenka Mercado is a 53year old male.     HPI:   Patient presents with:  Consult: feels \"bubbles in gut\"; bloating; usually after eating; started back in October    The patient is a 81-year-old male who has a history of reflux and likely functional abdomina CHANTEL,REMV PART ETHMOID  04/12/2018   • NASAL SCOPY,RMV TISS MAXILL SINUS Left 04/12/2018   • REPAIR OF NASAL SEPTUM  04/12/2018   • UPPER GI ENDOSCOPY PERFORMED        Family History   Problem Relation Age of Onset   • Diabetes Father 79        per NG   • COMMENTS)    Comment:Sneezing, sinus congestion      ROS:   The patient denies any chest pain or shortness of breath,  No neurologic or dermatologic symptoms.      PHYSICAL EXAM:   Blood pressure (!) 171/101, pulse 75, height 6' 1\" (1.854 m), weight 197 lb

## 2019-03-11 ENCOUNTER — TELEPHONE (OUTPATIENT)
Dept: GASTROENTEROLOGY | Facility: CLINIC | Age: 49
End: 2019-03-11

## 2019-03-11 NOTE — TELEPHONE ENCOUNTER
Dr Suly Szymanski, pt calling to update you since 1/31 OV. Continues to have the same \"gut bubbling \" as before. Denies pain, nausea, vomiting. Has been back on Paxil 4 1/2 weeks. Tried IBgard 3 weeks but no improvement. Please advise.   Offered appt with [facility]

## 2019-03-11 NOTE — TELEPHONE ENCOUNTER
Pt contacted, given message below. Accepted appt with [facility] on Mon 3/25, instructed to arrive 10:45am and given directions to [facility] CHARLIE.

## 2019-03-11 NOTE — TELEPHONE ENCOUNTER
Lets have him see Sravani Rizo in follow up to monitory weight, vitals and symptoms   Sometimes it can take 2 - 3 months for the paxel to take effectt

## 2019-03-13 ENCOUNTER — OFFICE VISIT (OUTPATIENT)
Dept: INTERNAL MEDICINE CLINIC | Facility: CLINIC | Age: 49
End: 2019-03-13
Payer: COMMERCIAL

## 2019-03-13 VITALS
HEART RATE: 90 BPM | BODY MASS INDEX: 25.18 KG/M2 | DIASTOLIC BLOOD PRESSURE: 88 MMHG | SYSTOLIC BLOOD PRESSURE: 138 MMHG | WEIGHT: 190 LBS | RESPIRATION RATE: 16 BRPM | TEMPERATURE: 98 F | HEIGHT: 73 IN

## 2019-03-13 DIAGNOSIS — K21.9 GASTROESOPHAGEAL REFLUX DISEASE WITHOUT ESOPHAGITIS: ICD-10-CM

## 2019-03-13 DIAGNOSIS — J02.9 SORE THROAT: Primary | ICD-10-CM

## 2019-03-13 DIAGNOSIS — F41.9 ANXIETY: ICD-10-CM

## 2019-03-13 LAB
CONTROL LINE PRESENT WITH A CLEAR BACKGROUND (YES/NO): YES YES/NO
KIT LOT #: NORMAL NUMERIC
STREP GRP A CUL-SCR: NEGATIVE

## 2019-03-13 PROCEDURE — 87880 STREP A ASSAY W/OPTIC: CPT | Performed by: INTERNAL MEDICINE

## 2019-03-13 PROCEDURE — 99214 OFFICE O/P EST MOD 30 MIN: CPT | Performed by: INTERNAL MEDICINE

## 2019-03-13 PROCEDURE — 99213 OFFICE O/P EST LOW 20 MIN: CPT | Performed by: INTERNAL MEDICINE

## 2019-03-13 NOTE — PROGRESS NOTES
Rodrigo Daniel is a 53year old male. HPI:   1. Sore throat    Sore throat, for a week, not sore with swallowing, hell feel it night while sleeping. Has slight congestion, no fevers   More so on the legt side, describes it at a sore muscle.      2. Hui [place] Auto-injector  Disp:  Rfl:    ClonazePAM 0.5 MG Oral Tab Take 1 tablet (0.5 mg total) by mouth nightly as needed for Anxiety.  Disp: 60 tablet Rfl: 0      Past Medical History:   Diagnosis Date   • Allergic rhinitis    • Anxiety    • Blepharitis of both eye infection.    - STREP A ASSAY W/OPTIC    2. Gastroesophageal reflux disease without esophagitis    Elevate the head of the bed 4 inches or so. Avoid tight undergarments. Do not eat for 2-3 hours before going to bed. Take prilosec  OTC or another PPI daily.

## 2019-03-14 NOTE — PROGRESS NOTES
[facility] Follow-up Visit    Jocelyn symptoms may be associated with his anxiety however he is frustrated they got the Paxil has not been helpful. He has looked into outside psychological help however he has limited options due to his insurance coverage. Denies lower GI concerns.   No lo NASAL SCOPY,REMV PART ETHMOID  04/12/2018   • NASAL SCOPY,RMV TISS MAXILL SINUS Left 04/12/2018   • REPAIR OF NASAL SEPTUM  04/12/2018   • UPPER GI ENDOSCOPY PERFORMED        Family History   Problem Relation Age of Onset   • Diabetes Father 79        per nose    Comment:Sneezing, sinus congestion    ROS:   CONSTITUTIONAL:  negative for fevers, chills  EYES:  negative for change in vision  RESPIRATORY:  negative for  shortness of breath  CARDIOVASCULAR:  negative for  chest pain  GASTROINTESTINAL:  see HPI presentation I doubt significant GI pathology. He can consider FD Vessie Bitter versus IB Vessie Bitter which may be more assistive. Unfortunately Paxil has not alleviated symptoms.   We discussed an increase in esomeprazole to twice daily however I doubt this alone will p

## 2019-03-20 ENCOUNTER — TELEPHONE (OUTPATIENT)
Dept: INTERNAL MEDICINE CLINIC | Facility: CLINIC | Age: 49
End: 2019-03-20

## 2019-03-20 DIAGNOSIS — R10.10 UPPER ABDOMINAL PAIN: Primary | ICD-10-CM

## 2019-03-20 NOTE — TELEPHONE ENCOUNTER
Patient has follow up appt with Hayden HERNANDEZ at Dr Petar Arriaga office Monday March 25. Please sign off on referral.    Per his insurance [facility] he needs a separate referral. Patient called insurance.     Thanks,    Milena Serrano

## 2019-03-23 ENCOUNTER — TELEPHONE (OUTPATIENT)
Dept: OTHER | Age: 49
End: 2019-03-23

## 2019-03-23 NOTE — TELEPHONE ENCOUNTER
Received a call from patient's wife who is on HIPAA. Wife reports patient was seen on 3/13/19 by Dr. Neil Sandoval for a sore throat. Wife reports the patient continues to have the sore throat especially when he talks.  Wife reports the pain is intermittent, bu

## 2019-03-23 NOTE — TELEPHONE ENCOUNTER
Reviewed recent note from PCP  Noted that patient had a negative strep test and was treated for viral sore throat  He also had abdominal pain and he will be seeing the GI doctor on Monday  Agree with evaluation if symptoms are continuing and severe

## 2019-03-25 ENCOUNTER — OFFICE VISIT (OUTPATIENT)
Dept: INTERNAL MEDICINE CLINIC | Facility: CLINIC | Age: 49
End: 2019-03-25
Payer: COMMERCIAL

## 2019-03-25 ENCOUNTER — OFFICE VISIT (OUTPATIENT)
Dept: GASTROENTEROLOGY | Facility: CLINIC | Age: 49
End: 2019-03-25
Payer: COMMERCIAL

## 2019-03-25 VITALS
SYSTOLIC BLOOD PRESSURE: 142 MMHG | WEIGHT: 194 LBS | DIASTOLIC BLOOD PRESSURE: 83 MMHG | RESPIRATION RATE: 18 BRPM | HEART RATE: 77 BPM | TEMPERATURE: 99 F | BODY MASS INDEX: 25.71 KG/M2 | HEIGHT: 73 IN

## 2019-03-25 VITALS
DIASTOLIC BLOOD PRESSURE: 90 MMHG | SYSTOLIC BLOOD PRESSURE: 138 MMHG | HEIGHT: 73 IN | BODY MASS INDEX: 25.21 KG/M2 | WEIGHT: 190.19 LBS | HEART RATE: 83 BPM

## 2019-03-25 DIAGNOSIS — K21.9 GASTROESOPHAGEAL REFLUX DISEASE WITHOUT ESOPHAGITIS: ICD-10-CM

## 2019-03-25 DIAGNOSIS — R14.0 ABDOMINAL BLOATING: ICD-10-CM

## 2019-03-25 DIAGNOSIS — J02.9 SORE THROAT: Primary | ICD-10-CM

## 2019-03-25 DIAGNOSIS — F41.9 ANXIETY: ICD-10-CM

## 2019-03-25 DIAGNOSIS — R19.8 BORBORYGMI: Primary | ICD-10-CM

## 2019-03-25 PROCEDURE — 99214 OFFICE O/P EST MOD 30 MIN: CPT | Performed by: INTERNAL MEDICINE

## 2019-03-25 PROCEDURE — 99213 OFFICE O/P EST LOW 20 MIN: CPT | Performed by: NURSE PRACTITIONER

## 2019-03-25 PROCEDURE — 99212 OFFICE O/P EST SF 10 MIN: CPT | Performed by: INTERNAL MEDICINE

## 2019-03-25 PROCEDURE — 99212 OFFICE O/P EST SF 10 MIN: CPT | Performed by: NURSE PRACTITIONER

## 2019-03-25 RX ORDER — PAROXETINE 10 MG/1
10 TABLET, FILM COATED ORAL DAILY
Refills: 3 | COMMUNITY
Start: 2019-03-19 | End: 2019-04-29

## 2019-03-25 NOTE — PATIENT INSTRUCTIONS
1. Consider FD Liss  2. Consider increase of esomeprazole to twice daily  3.  Consider additional assistance for anxiety

## 2019-03-25 NOTE — TELEPHONE ENCOUNTER
Patient states he continues with throat/neck pain. Scheduled OV for today 3/25/19 with Dr. Kenyon Hairston for 6:30pm at [facility].

## 2019-03-27 NOTE — PROGRESS NOTES
Ministerio Regan is a 53year old male. HPI:   1. Sore throat    Sore throat, for a week, not sore with swallowing, he'll feel it night while sleeping. Can wake up with pain wak  Has slight congestion, no fevers Swallowing occasionally causes discomfort.  Can DAILY. Disp: 90 capsule Rfl: 0   EPINEPHrine (EPIPEN) 0.3 MG/0.3ML Injection Solution Auto-injector  Disp:  Rfl:    PARoxetine Mesylate 10 MG Oral Tab Take 1 tablet (10 mg total) by mouth every morning.  Disp: 30 tablet Rfl: 3   ClonazePAM 0.5 MG Oral Tab T auscultation  CARDIO: RRR without murmur  GI: good BS's,no masses, HSM or tenderness/ Mild bloated stomach  EXTREMITIES: no cyanosis, clubbing or edema    ASSESSMENT AND PLAN:   1.  Pain in throat    Has some residual pain in his left side of his throat don

## 2019-04-01 ENCOUNTER — TELEPHONE (OUTPATIENT)
Dept: INTERNAL MEDICINE CLINIC | Facility: CLINIC | Age: 49
End: 2019-04-01

## 2019-04-01 NOTE — TELEPHONE ENCOUNTER
Patient called requesting a referral to ENT for throat pain.      Please sign off on referral request.     Thank you, Nato

## 2019-04-09 ENCOUNTER — OFFICE VISIT (OUTPATIENT)
Dept: OTOLARYNGOLOGY | Facility: CLINIC | Age: 49
End: 2019-04-09
Payer: COMMERCIAL

## 2019-04-09 VITALS
SYSTOLIC BLOOD PRESSURE: 128 MMHG | HEIGHT: 73 IN | BODY MASS INDEX: 25.71 KG/M2 | WEIGHT: 194 LBS | DIASTOLIC BLOOD PRESSURE: 82 MMHG | TEMPERATURE: 98 F

## 2019-04-09 DIAGNOSIS — R07.0 THROAT PAIN: Primary | ICD-10-CM

## 2019-04-09 PROCEDURE — 99213 OFFICE O/P EST LOW 20 MIN: CPT | Performed by: OTOLARYNGOLOGY

## 2019-04-09 PROCEDURE — 31575 DIAGNOSTIC LARYNGOSCOPY: CPT | Performed by: OTOLARYNGOLOGY

## 2019-04-09 NOTE — PROGRESS NOTES
Carolyn Kohler is a 53year old male. Patient presents with:  Throat Problem: on and off for a couple of wks    HPI:   For the last few weeks has been experiencing intermittent sharp pain in his neck.   This started after he was swallowing a few weeks ago and denies fever, chills, sweats, weight loss, weight gain  SKIN: denies any unusual skin lesions or rashes  RESPIRATORY: denies shortness of breath with exertion  NEURO: denies headaches    EXAM:   /82 (BP Location: Left arm, Patient Position: Sitting, the larynx. A thorough examination of the interior of the larynx was performed. Findings were as follows.        Hypopharynx/Larynx:  Epiglottis is normal.  Arytenoids:  Bilateral: Arytenoids are normal.  Vocal folds-false  Bilateral: Vocal folds (false)

## 2019-04-19 ENCOUNTER — TELEPHONE (OUTPATIENT)
Dept: OTOLARYNGOLOGY | Facility: CLINIC | Age: 49
End: 2019-04-19

## 2019-04-19 NOTE — TELEPHONE ENCOUNTER
Dr. Ewelina Ferraro pt states he is still having pain, pt asking how long to give to see if pain improves? . Per pt he states he is having more throat pain when drinking fluids but not eating solid food.

## 2019-04-22 NOTE — TELEPHONE ENCOUNTER
If he has not had any improvement he should return to the office and we can discuss if we should do some imaging or other studies

## 2019-04-29 ENCOUNTER — OFFICE VISIT (OUTPATIENT)
Dept: OTOLARYNGOLOGY | Facility: CLINIC | Age: 49
End: 2019-04-29
Payer: COMMERCIAL

## 2019-04-29 VITALS
SYSTOLIC BLOOD PRESSURE: 128 MMHG | HEIGHT: 73 IN | WEIGHT: 195 LBS | DIASTOLIC BLOOD PRESSURE: 88 MMHG | TEMPERATURE: 98 F | BODY MASS INDEX: 25.84 KG/M2

## 2019-04-29 DIAGNOSIS — R07.0 THROAT PAIN: Primary | ICD-10-CM

## 2019-04-29 PROCEDURE — 99212 OFFICE O/P EST SF 10 MIN: CPT | Performed by: OTOLARYNGOLOGY

## 2019-04-29 PROCEDURE — 99213 OFFICE O/P EST LOW 20 MIN: CPT | Performed by: OTOLARYNGOLOGY

## 2019-04-30 NOTE — PROGRESS NOTES
Sally Weiss is a 53year old male. Patient presents with:   Follow - Up: regarding throat pain, no change in symptoms since last visit     HPI:   He continues to experience some pain in the right side of his neck he talks which goes away as soon as he stop weight gain  SKIN: denies any unusual skin lesions or rashes  RESPIRATORY: denies shortness of breath with exertion  NEURO: denies headaches    EXAM:   /88   Temp 97.6 °F (36.4 °C) (Oral)   Ht 6' 1\" (1.854 m)   Wt 195 lb (88.5 kg)   BMI 25.73 kg/m²

## 2019-05-06 ENCOUNTER — TELEPHONE (OUTPATIENT)
Dept: OTOLARYNGOLOGY | Facility: CLINIC | Age: 49
End: 2019-05-06

## 2019-05-17 ENCOUNTER — HOSPITAL ENCOUNTER (OUTPATIENT)
Dept: CT IMAGING | Age: 49
Discharge: HOME | End: 2019-05-17
Attending: OTOLARYNGOLOGY
Payer: COMMERCIAL

## 2019-05-17 DIAGNOSIS — R07.0 THROAT PAIN: ICD-10-CM

## 2019-05-17 PROCEDURE — 70491 CT SOFT TISSUE NECK W/DYE: CPT | Performed by: OTOLARYNGOLOGY

## 2019-05-17 PROCEDURE — 82565 ASSAY OF CREATININE: CPT

## 2019-05-21 RX ORDER — PAROXETINE 10 MG/1
TABLET, FILM COATED ORAL
Qty: 90 TABLET | Refills: 1 | Status: SHIPPED | OUTPATIENT
Start: 2019-05-21 | End: 2019-06-13

## 2019-05-21 NOTE — TELEPHONE ENCOUNTER
Refill passed per [facility], [facility] protocol.   Refill Protocol Appointment Criteria  · Appointment scheduled in the past 6 months or in the next 3 months  Recent Outpatient Visits            3 weeks ago Throat pain    [facility] for [address] [facility]

## 2019-06-13 ENCOUNTER — APPOINTMENT (OUTPATIENT)
Dept: LAB | Facility: HOSPITAL | Age: 49
End: 2019-06-13
Attending: INTERNAL MEDICINE
Payer: COMMERCIAL

## 2019-06-13 ENCOUNTER — OFFICE VISIT (OUTPATIENT)
Dept: INTERNAL MEDICINE CLINIC | Facility: CLINIC | Age: 49
End: 2019-06-13

## 2019-06-13 VITALS
BODY MASS INDEX: 25.31 KG/M2 | HEIGHT: 73 IN | WEIGHT: 191 LBS | SYSTOLIC BLOOD PRESSURE: 133 MMHG | HEART RATE: 80 BPM | RESPIRATION RATE: 16 BRPM | DIASTOLIC BLOOD PRESSURE: 88 MMHG

## 2019-06-13 DIAGNOSIS — Z00.00 PHYSICAL EXAM, ANNUAL: Primary | ICD-10-CM

## 2019-06-13 DIAGNOSIS — Z00.00 PHYSICAL EXAM, ANNUAL: ICD-10-CM

## 2019-06-13 DIAGNOSIS — R10.10 UPPER ABDOMINAL PAIN: ICD-10-CM

## 2019-06-13 PROCEDURE — 81003 URINALYSIS AUTO W/O SCOPE: CPT

## 2019-06-13 PROCEDURE — 36415 COLL VENOUS BLD VENIPUNCTURE: CPT | Performed by: INTERNAL MEDICINE

## 2019-06-13 PROCEDURE — 99396 PREV VISIT EST AGE 40-64: CPT | Performed by: INTERNAL MEDICINE

## 2019-06-13 PROCEDURE — 80053 COMPREHEN METABOLIC PANEL: CPT | Performed by: INTERNAL MEDICINE

## 2019-06-13 PROCEDURE — 84443 ASSAY THYROID STIM HORMONE: CPT

## 2019-06-13 PROCEDURE — 80061 LIPID PANEL: CPT

## 2019-06-13 PROCEDURE — 85025 COMPLETE CBC W/AUTO DIFF WBC: CPT | Performed by: INTERNAL MEDICINE

## 2019-06-13 RX ORDER — PAROXETINE 10 MG/1
TABLET, FILM COATED ORAL
Qty: 90 TABLET | Refills: 1 | Status: SHIPPED | OUTPATIENT
Start: 2019-06-13 | End: 2019-08-13

## 2019-06-13 NOTE — PROGRESS NOTES
Sally Weiss is a 53year old male who presents for a complete physical exam.   HPI:   Pt complains of dizziness.       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 Exercise:  twice per week.   Diet: watches minimally     REVIEW OF SYSTEMS:     GENERAL: feels well otherwise  SKIN: denies any unusual skin lesions  EYES:denies blurred weekly. Health maintenance, will check fasting Lipids, CMP, CBC and PSA. Pt referred for screening colonoscopy. Pt info discussed: exercise, low fat diet, testicular self exam and prostate cancer screening. Start paxil again for anxiety/dep[ression.  In

## 2019-07-10 NOTE — TELEPHONE ENCOUNTER
Controlled medication pending for review. If approved needs to be called in or faxed by on-site staff.     Last Rx: 10/13/2018  LOV: 6/13/19

## 2019-07-10 NOTE — TELEPHONE ENCOUNTER
Current Outpatient Medications:   •  ClonazePAM 0.5 MG Oral Tab, Take 1 tablet (0.5 mg total) by mouth nightly as needed for Anxiety. , Disp: 60 tablet, Rfl: 0

## 2019-07-11 RX ORDER — CLONAZEPAM 0.5 MG/1
0.5 TABLET ORAL NIGHTLY PRN
Qty: 60 TABLET | Refills: 0 | OUTPATIENT
Start: 2019-07-11 | End: 2020-08-24

## 2019-07-12 ENCOUNTER — TELEPHONE (OUTPATIENT)
Dept: INTERNAL MEDICINE CLINIC | Facility: CLINIC | Age: 49
End: 2019-07-12

## 2019-07-12 NOTE — TELEPHONE ENCOUNTER
Clinical site staff please call in Rx and notify patient.  Please respond to pool: EM [facility] LPN/CMA

## 2019-07-12 NOTE — TELEPHONE ENCOUNTER
Current Outpatient Medications:  clonazePAM 0.5 MG Oral Tab Take 1 tablet (0.5 mg total) by mouth nightly as needed for Anxiety.  Disp: 60 tablet Rfl: 0

## 2019-08-04 ENCOUNTER — HOSPITAL ENCOUNTER (EMERGENCY)
Facility: HOSPITAL | Age: 49
Discharge: HOME | End: 2019-08-04
Attending: EMERGENCY MEDICINE
Payer: COMMERCIAL

## 2019-08-04 VITALS
DIASTOLIC BLOOD PRESSURE: 84 MMHG | BODY MASS INDEX: 25.84 KG/M2 | SYSTOLIC BLOOD PRESSURE: 126 MMHG | OXYGEN SATURATION: 99 % | HEART RATE: 83 BPM | TEMPERATURE: 98 F | WEIGHT: 195 LBS | RESPIRATION RATE: 17 BRPM | HEIGHT: 73 IN

## 2019-08-04 DIAGNOSIS — A09 TRAVELER'S DIARRHEA: Primary | ICD-10-CM

## 2019-08-04 LAB
ANION GAP SERPL CALC-SCNC: 7 MMOL/L (ref 0–18)
BASOPHILS # BLD AUTO: 0.02 X10(3) UL (ref 0–0.2)
BASOPHILS NFR BLD AUTO: 0.2 %
BUN BLD-MCNC: 15 MG/DL (ref 7–18)
BUN/CREAT SERPL: 13.2 (ref 10–20)
CALCIUM BLD-MCNC: 9.1 MG/DL (ref 8.5–10.1)
CHLORIDE SERPL-SCNC: 110 MMOL/L (ref 98–112)
CO2 SERPL-SCNC: 27 MMOL/L (ref 21–32)
CREAT BLD-MCNC: 1.14 MG/DL (ref 0.7–1.3)
DEPRECATED RDW RBC AUTO: 39.4 FL (ref 35.1–46.3)
EOSINOPHIL # BLD AUTO: 0.08 X10(3) UL (ref 0–0.7)
EOSINOPHIL NFR BLD AUTO: 0.9 %
ERYTHROCYTE [DISTWIDTH] IN BLOOD BY AUTOMATED COUNT: 12.4 % (ref 11–15)
GLUCOSE BLD-MCNC: 88 MG/DL (ref 70–99)
HCT VFR BLD AUTO: 46.9 % (ref 39–53)
HGB BLD-MCNC: 15.5 G/DL (ref 13–17.5)
IMM GRANULOCYTES # BLD AUTO: 0.02 X10(3) UL (ref 0–1)
IMM GRANULOCYTES NFR BLD: 0.2 %
LYMPHOCYTES # BLD AUTO: 0.85 X10(3) UL (ref 1–4)
LYMPHOCYTES NFR BLD AUTO: 10 %
MCH RBC QN AUTO: 28.9 PG (ref 26–34)
MCHC RBC AUTO-ENTMCNC: 33 G/DL (ref 31–37)
MCV RBC AUTO: 87.3 FL (ref 80–100)
MONOCYTES # BLD AUTO: 0.55 X10(3) UL (ref 0.1–1)
MONOCYTES NFR BLD AUTO: 6.5 %
NEUTROPHILS # BLD AUTO: 6.99 X10 (3) UL (ref 1.5–7.7)
NEUTROPHILS # BLD AUTO: 6.99 X10(3) UL (ref 1.5–7.7)
NEUTROPHILS NFR BLD AUTO: 82.2 %
OSMOLALITY SERPL CALC.SUM OF ELEC: 298 MOSM/KG (ref 275–295)
PLATELET # BLD AUTO: 208 10(3)UL (ref 150–450)
POTASSIUM SERPL-SCNC: 4.1 MMOL/L (ref 3.5–5.1)
RBC # BLD AUTO: 5.37 X10(6)UL (ref 4.3–5.7)
SODIUM SERPL-SCNC: 144 MMOL/L (ref 136–145)
WBC # BLD AUTO: 8.5 X10(3) UL (ref 4–11)

## 2019-08-04 PROCEDURE — 99284 EMERGENCY DEPT VISIT MOD MDM: CPT

## 2019-08-04 PROCEDURE — 80048 BASIC METABOLIC PNL TOTAL CA: CPT | Performed by: EMERGENCY MEDICINE

## 2019-08-04 PROCEDURE — 85025 COMPLETE CBC W/AUTO DIFF WBC: CPT | Performed by: EMERGENCY MEDICINE

## 2019-08-04 PROCEDURE — 80048 BASIC METABOLIC PNL TOTAL CA: CPT

## 2019-08-04 PROCEDURE — 96374 THER/PROPH/DIAG INJ IV PUSH: CPT

## 2019-08-04 PROCEDURE — 85025 COMPLETE CBC W/AUTO DIFF WBC: CPT

## 2019-08-04 PROCEDURE — 96361 HYDRATE IV INFUSION ADD-ON: CPT

## 2019-08-04 RX ORDER — ONDANSETRON 2 MG/ML
4 INJECTION INTRAMUSCULAR; INTRAVENOUS ONCE
Status: COMPLETED | OUTPATIENT
Start: 2019-08-04 | End: 2019-08-04

## 2019-08-04 RX ORDER — ONDANSETRON 4 MG/1
4 TABLET, ORALLY DISINTEGRATING ORAL EVERY 4 HOURS PRN
Qty: 10 TABLET | Refills: 0 | Status: SHIPPED | OUTPATIENT
Start: 2019-08-04 | End: 2019-08-11

## 2019-08-04 RX ORDER — CIPROFLOXACIN 500 MG/1
500 TABLET, FILM COATED ORAL 2 TIMES DAILY
Qty: 6 TABLET | Refills: 0 | Status: SHIPPED | OUTPATIENT
Start: 2019-08-04 | End: 2019-08-07

## 2019-08-04 NOTE — ED PROVIDER NOTES
Patient Seen in: [facility] AND [facility] Emergency Department    History   Patient presents with:  Nausea/Vomiting/Diarrhea (gastrointestinal)    Stated Complaint: diarrhea after coming from [place] last week     HPI    20-year-old male with no significant past m 2 beers/week    Drug use: No      Review of Systems   Constitutional: Negative for appetite change, fatigue and fever. HENT: Negative for congestion, ear pain and sore throat. Eyes: Negative for pain, discharge and redness.    Respiratory: Negative for Musculoskeletal: Normal range of motion. He exhibits no tenderness. Neurological: He is alert and oriented to person, place, and time.    5/5 strength in b/l UEs and LEs, normal sensation in all extremities, normal finger to nose b/l, normal gait, no fa RDW 12.4 11.0 - 15.0 %    .0 150.0 - 450.0 10(3)uL    Neutrophil Absolute Prelim 6.99 1.50 - 7.70 x10 (3) uL    Neutrophil Absolute 6.99 1.50 - 7.70 x10(3) uL    Lymphocyte Absolute 0.85 (L) 1.00 - 4.00 x10(3) uL    Monocyte Absolute 0.55 0.10 - chills, vomiting, pt expresses understanding and agrees to d/c instructions    EMERGENCY DEPARTMENT MEDICAL DECISION MAKING:  After obtaining the patient's history, performing the physical exam and reviewing the diagnostics, multiple initial diagnoses were

## 2019-08-04 NOTE — ED NOTES
Diarrhea since Tuesday. Patient was in [address] [place] [address] previously. Patient denies fevers/nausea/vomiting.

## 2019-08-04 NOTE — ED INITIAL ASSESSMENT (HPI)
Patient presents to ER with c/o diarrhea since Tuesday. Patient was in [place] last week. Also reports nausea. Denies fever.

## 2019-08-13 RX ORDER — FLUOXETINE 10 MG/1
10 CAPSULE ORAL DAILY
Qty: 30 CAPSULE | Refills: 2 | Status: SHIPPED | OUTPATIENT
Start: 2019-08-13 | End: 2019-11-01

## 2019-11-01 RX ORDER — FLUOXETINE 10 MG/1
CAPSULE ORAL
Qty: 30 CAPSULE | Refills: 2 | Status: SHIPPED | OUTPATIENT
Start: 2019-11-01 | End: 2020-02-02

## 2019-12-30 ENCOUNTER — APPOINTMENT (OUTPATIENT)
Dept: LAB | Facility: HOSPITAL | Age: 49
End: 2019-12-30
Attending: INTERNAL MEDICINE
Payer: COMMERCIAL

## 2019-12-30 ENCOUNTER — OFFICE VISIT (OUTPATIENT)
Dept: INTERNAL MEDICINE CLINIC | Facility: CLINIC | Age: 49
End: 2019-12-30

## 2019-12-30 VITALS
SYSTOLIC BLOOD PRESSURE: 144 MMHG | BODY MASS INDEX: 26.24 KG/M2 | HEIGHT: 73 IN | WEIGHT: 198 LBS | RESPIRATION RATE: 16 BRPM | DIASTOLIC BLOOD PRESSURE: 94 MMHG | HEART RATE: 69 BPM

## 2019-12-30 DIAGNOSIS — M54.50 CHRONIC RIGHT-SIDED LOW BACK PAIN WITHOUT SCIATICA: ICD-10-CM

## 2019-12-30 DIAGNOSIS — G89.29 CHRONIC RIGHT-SIDED LOW BACK PAIN WITHOUT SCIATICA: ICD-10-CM

## 2019-12-30 DIAGNOSIS — K21.9 GASTROESOPHAGEAL REFLUX DISEASE WITHOUT ESOPHAGITIS: ICD-10-CM

## 2019-12-30 DIAGNOSIS — K64.1 GRADE II HEMORRHOIDS: ICD-10-CM

## 2019-12-30 DIAGNOSIS — G89.29 CHRONIC RIGHT-SIDED LOW BACK PAIN WITHOUT SCIATICA: Primary | ICD-10-CM

## 2019-12-30 DIAGNOSIS — M54.50 CHRONIC RIGHT-SIDED LOW BACK PAIN WITHOUT SCIATICA: Primary | ICD-10-CM

## 2019-12-30 LAB
ALBUMIN SERPL-MCNC: 4 G/DL (ref 3.4–5)
ALBUMIN/GLOB SERPL: 1 {RATIO} (ref 1–2)
ALP LIVER SERPL-CCNC: 92 U/L (ref 45–117)
ALT SERPL-CCNC: 34 U/L (ref 16–61)
ANION GAP SERPL CALC-SCNC: 3 MMOL/L (ref 0–18)
AST SERPL-CCNC: 19 U/L (ref 15–37)
BILIRUB SERPL-MCNC: 0.4 MG/DL (ref 0.1–2)
BILIRUB UR QL: NEGATIVE
BUN BLD-MCNC: 11 MG/DL (ref 7–18)
BUN/CREAT SERPL: 9.7 (ref 10–20)
CALCIUM BLD-MCNC: 8.9 MG/DL (ref 8.5–10.1)
CHLORIDE SERPL-SCNC: 106 MMOL/L (ref 98–112)
CLARITY UR: CLEAR
CO2 SERPL-SCNC: 32 MMOL/L (ref 21–32)
COLOR UR: YELLOW
CREAT BLD-MCNC: 1.13 MG/DL (ref 0.7–1.3)
GLOBULIN PLAS-MCNC: 3.9 G/DL (ref 2.8–4.4)
GLUCOSE BLD-MCNC: 80 MG/DL (ref 70–99)
GLUCOSE UR-MCNC: NEGATIVE MG/DL
HGB UR QL STRIP.AUTO: NEGATIVE
KETONES UR-MCNC: NEGATIVE MG/DL
LEUKOCYTE ESTERASE UR QL STRIP.AUTO: NEGATIVE
M PROTEIN MFR SERPL ELPH: 7.9 G/DL (ref 6.4–8.2)
NITRITE UR QL STRIP.AUTO: NEGATIVE
OSMOLALITY SERPL CALC.SUM OF ELEC: 290 MOSM/KG (ref 275–295)
PATIENT FASTING Y/N/NP: NO
PH UR: 7 [PH] (ref 5–8)
POTASSIUM SERPL-SCNC: 4 MMOL/L (ref 3.5–5.1)
PROT UR-MCNC: NEGATIVE MG/DL
SODIUM SERPL-SCNC: 141 MMOL/L (ref 136–145)
SP GR UR STRIP: 1.01 (ref 1–1.03)
UROBILINOGEN UR STRIP-ACNC: <2

## 2019-12-30 PROCEDURE — 99214 OFFICE O/P EST MOD 30 MIN: CPT | Performed by: INTERNAL MEDICINE

## 2019-12-30 PROCEDURE — 36415 COLL VENOUS BLD VENIPUNCTURE: CPT

## 2019-12-30 PROCEDURE — 81003 URINALYSIS AUTO W/O SCOPE: CPT

## 2019-12-30 PROCEDURE — 80053 COMPREHEN METABOLIC PANEL: CPT

## 2019-12-30 NOTE — PROGRESS NOTES
Duncan Ruiz is a 51year old male. HPI:     1. Chronic right-sided low back pain without sciatica    Has  Noted a recurrent right sided flank pain 5/10 and has come and gone intermittently.  Noted the pain in bed last night and it kept him awake somke las Smokeless tobacco: Never Used      Tobacco comment: per NG    Alcohol use:  Yes      Alcohol/week: 0.0 standard drinks      Comment: socially - 2 beers/week    Drug use: No       REVIEW OF SYSTEMS:   GENERAL HEALTH: feels well otherwise  SKIN: denies any un meal  Avoid NSAIDs alike advil, motrin, aleve and naprosyn  Avoid caffeine, alcohol and tobacco use OTC H2 blocjkers( like pepcid/famotidine, zantac/ranidine) and /or antacids  Can use PPIs if needed like pantaprozole (protonix,) omeprazole or aciphex    3

## 2020-01-06 PROBLEM — K64.1 GRADE II HEMORRHOIDS: Status: ACTIVE | Noted: 2020-01-06

## 2020-01-06 PROBLEM — M54.50 CHRONIC RIGHT-SIDED LOW BACK PAIN WITHOUT SCIATICA: Status: ACTIVE | Noted: 2020-01-06

## 2020-01-06 PROBLEM — G89.29 CHRONIC RIGHT-SIDED LOW BACK PAIN WITHOUT SCIATICA: Status: ACTIVE | Noted: 2020-01-06

## 2020-01-14 ENCOUNTER — TELEPHONE (OUTPATIENT)
Dept: INTERNAL MEDICINE CLINIC | Facility: CLINIC | Age: 50
End: 2020-01-14

## 2020-01-14 ENCOUNTER — TELEPHONE (OUTPATIENT)
Dept: FAMILY MEDICINE CLINIC | Facility: CLINIC | Age: 50
End: 2020-01-14

## 2020-01-14 DIAGNOSIS — H93.19 TINNITUS, UNSPECIFIED LATERALITY: Primary | ICD-10-CM

## 2020-01-14 NOTE — TELEPHONE ENCOUNTER
Pt left a vm requesting a referral to see Dr. Kalyani Evans. Please sign referral if you agree. Thank you.

## 2020-01-21 ENCOUNTER — OFFICE VISIT (OUTPATIENT)
Dept: AUDIOLOGY | Facility: CLINIC | Age: 50
End: 2020-01-21

## 2020-01-21 ENCOUNTER — OFFICE VISIT (OUTPATIENT)
Dept: OTOLARYNGOLOGY | Facility: CLINIC | Age: 50
End: 2020-01-21

## 2020-01-21 VITALS
HEIGHT: 73 IN | BODY MASS INDEX: 25.84 KG/M2 | WEIGHT: 195 LBS | DIASTOLIC BLOOD PRESSURE: 87 MMHG | TEMPERATURE: 97 F | SYSTOLIC BLOOD PRESSURE: 127 MMHG

## 2020-01-21 DIAGNOSIS — H93.12 TINNITUS OF LEFT EAR: Primary | ICD-10-CM

## 2020-01-21 DIAGNOSIS — H93.19 TINNITUS, UNSPECIFIED LATERALITY: Primary | ICD-10-CM

## 2020-01-21 DIAGNOSIS — J30.89 NON-SEASONAL ALLERGIC RHINITIS, UNSPECIFIED TRIGGER: ICD-10-CM

## 2020-01-21 PROCEDURE — 92567 TYMPANOMETRY: CPT | Performed by: AUDIOLOGIST

## 2020-01-21 PROCEDURE — 92557 COMPREHENSIVE HEARING TEST: CPT | Performed by: AUDIOLOGIST

## 2020-01-21 PROCEDURE — 99213 OFFICE O/P EST LOW 20 MIN: CPT | Performed by: OTOLARYNGOLOGY

## 2020-01-21 RX ORDER — MONTELUKAST SODIUM 10 MG/1
10 TABLET ORAL NIGHTLY
Qty: 30 TABLET | Refills: 3 | Status: SHIPPED | OUTPATIENT
Start: 2020-01-21 | End: 2020-08-24

## 2020-01-21 NOTE — PROGRESS NOTES
Lisa Winchester is a 51year old male.  Patient presents with:  Ear Problem: patient stated left ear having spasm and heard tapping sound usually at night disrupting pt sleep    HPI:   For the last 3 weeks he has been experiencing noise in his left ear consist Comment: socially - 2 beers/week    Drug use: No       REVIEW OF SYSTEMS:   GENERAL HEALTH: feels well otherwise  GENERAL : denies fever, chills, sweats, weight loss, weight gain  SKIN: denies any unusual skin lesions or rashes  RESPIRATORY: denies shortne at least partially secondary to allergy issues. I recommended allergy testing for him      The patient indicates understanding of these issues and agrees to the plan. Michael Rios MD  1/21/2020  3:09 PM

## 2020-01-22 NOTE — PROGRESS NOTES
Sai Ladd was referred for testing by Linda Ross V for a thumping sound in his left ear   12/14/1970  [id number]      Otoscopic inspection: right ear no cerumen; left ear no cerumen.        Tests/Procedures  Patient was tested via  standar

## 2020-01-29 ENCOUNTER — OFFICE VISIT (OUTPATIENT)
Dept: INTERNAL MEDICINE CLINIC | Facility: CLINIC | Age: 50
End: 2020-01-29

## 2020-01-29 ENCOUNTER — TELEPHONE (OUTPATIENT)
Dept: FAMILY MEDICINE CLINIC | Facility: CLINIC | Age: 50
End: 2020-01-29

## 2020-01-29 VITALS
DIASTOLIC BLOOD PRESSURE: 85 MMHG | BODY MASS INDEX: 26.64 KG/M2 | WEIGHT: 201 LBS | RESPIRATION RATE: 16 BRPM | HEIGHT: 73 IN | SYSTOLIC BLOOD PRESSURE: 123 MMHG | HEART RATE: 87 BPM

## 2020-01-29 DIAGNOSIS — H92.02 LEFT EAR PAIN: Primary | ICD-10-CM

## 2020-01-29 DIAGNOSIS — Z82.49 FH: CAD (CORONARY ARTERY DISEASE): ICD-10-CM

## 2020-01-29 DIAGNOSIS — K21.9 GASTROESOPHAGEAL REFLUX DISEASE WITHOUT ESOPHAGITIS: ICD-10-CM

## 2020-01-29 PROCEDURE — 99214 OFFICE O/P EST MOD 30 MIN: CPT | Performed by: INTERNAL MEDICINE

## 2020-01-29 RX ORDER — CYCLOBENZAPRINE HCL 5 MG
5 TABLET ORAL NIGHTLY
Qty: 30 TABLET | Refills: 2 | Status: SHIPPED | OUTPATIENT
Start: 2020-01-29 | End: 2020-08-24

## 2020-01-29 NOTE — TELEPHONE ENCOUNTER
Patient has scheduled appointment today see comments.      Appointment For: Yenifer Vogel ([id number])   Visit Type: Fela Santos ([id number])      1/29/2020    9:30 AM  20 mins. Will Noble MD   [facility]      Patient Comments:   Tightness of c

## 2020-01-29 NOTE — PROGRESS NOTES
Rodrigo Daniel is a 51year old male. HPI:     1. Left ear pain    Has been having a muscle spasm like discomfort in the left ear that comes on at night and causes him to wake up and not be able to sleep. Saw ENT who ordered singulair.  Did NOT seem to help Sinus problems; per NG   • Sleep apnea    • Superficial punctate keratitis 2010    OS; per NG      Social History:  Social History    Tobacco Use      Smoking status: Never Smoker      Smokeless tobacco: Never Used      Tobacco comment: per NG    Alcohol u Gastroesophageal reflux disease without esophagitis    Keep the head of the bed raised  Avoid tight fitting clothes  Work on losing weight  Avoid eating late at night and lying down after eating a meal  Avoid NSAIDs alike advil, motrin aleve and naprosyn

## 2020-02-02 RX ORDER — FLUOXETINE 10 MG/1
CAPSULE ORAL
Qty: 90 CAPSULE | Refills: 1 | Status: SHIPPED | OUTPATIENT
Start: 2020-02-02 | End: 2020-06-09

## 2020-02-02 NOTE — TELEPHONE ENCOUNTER
Refill passed per [facility], [facility] protocol.   Refill Protocol Appointment Criteria  · Appointment scheduled in the past 6 months or in the next 3 months  Recent Outpatient Visits            4 days ago Left ear pain    [facility], [facility], [address] [facility] Trevor Lombardi

## 2020-02-13 ENCOUNTER — HOSPITAL ENCOUNTER (OUTPATIENT)
Dept: CT IMAGING | Facility: HOSPITAL | Age: 50
Discharge: HOME | End: 2020-02-13
Attending: INTERNAL MEDICINE

## 2020-02-13 DIAGNOSIS — Z82.49 FH: CAD (CORONARY ARTERY DISEASE): ICD-10-CM

## 2020-02-18 ENCOUNTER — OFFICE VISIT (OUTPATIENT)
Dept: OTOLARYNGOLOGY | Facility: CLINIC | Age: 50
End: 2020-02-18

## 2020-02-18 VITALS
DIASTOLIC BLOOD PRESSURE: 80 MMHG | WEIGHT: 201 LBS | TEMPERATURE: 98 F | BODY MASS INDEX: 26.64 KG/M2 | SYSTOLIC BLOOD PRESSURE: 124 MMHG | HEIGHT: 73 IN

## 2020-02-18 DIAGNOSIS — H93.12 TINNITUS OF LEFT EAR: Primary | ICD-10-CM

## 2020-02-18 PROCEDURE — 99213 OFFICE O/P EST LOW 20 MIN: CPT | Performed by: OTOLARYNGOLOGY

## 2020-02-18 NOTE — PROGRESS NOTES
Lenka Mercado is a 51year old male. Patient presents with: Follow - Up: 1 mo f/u regarding tinnitus of left ear; pt reports no improvement in symptoms with medications    HPI:   He continues to experience a rapid fire sound in his left ear.   There is been REVIEW OF SYSTEMS:   GENERAL HEALTH: feels well otherwise  GENERAL : denies fever, chills, sweats, weight loss, weight gain  SKIN: denies any unusual skin lesions or rashes  RESPIRATORY: denies shortness of breath with exertion  NEURO: denies headaches

## 2020-02-26 ENCOUNTER — TELEPHONE (OUTPATIENT)
Dept: INTERNAL MEDICINE CLINIC | Facility: CLINIC | Age: 50
End: 2020-02-26

## 2020-02-26 RX ORDER — DIAZEPAM 2 MG/1
2 TABLET ORAL EVERY 6 HOURS PRN
Qty: 20 TABLET | Refills: 0 | Status: SHIPPED | OUTPATIENT
Start: 2020-02-26 | End: 2020-08-24

## 2020-02-26 NOTE — TELEPHONE ENCOUNTER
Please see message below    Per pharmacy on file drug inter-action between diazepam and  FLUOXETINE HCL. Message from first encounter.     Eugene Desouza, MDPhysicianSigned  2:14 PM                Let patient know I sent some valium over to his

## 2020-02-27 ENCOUNTER — HOSPITAL ENCOUNTER (OUTPATIENT)
Dept: MRI IMAGING | Age: 50
Discharge: HOME | End: 2020-02-27
Attending: OTOLARYNGOLOGY
Payer: COMMERCIAL

## 2020-02-27 DIAGNOSIS — H93.12 TINNITUS OF LEFT EAR: ICD-10-CM

## 2020-02-27 PROCEDURE — 70553 MRI BRAIN STEM W/O & W/DYE: CPT | Performed by: OTOLARYNGOLOGY

## 2020-02-27 PROCEDURE — A9575 INJ GADOTERATE MEGLUMI 0.1ML: HCPCS | Performed by: OTOLARYNGOLOGY

## 2020-05-15 NOTE — TELEPHONE ENCOUNTER
[address]:     Current Outpatient Medications:   •  gabapentin 100 MG Oral Cap, Take 1 capsule (100 mg total) by mouth 2 (two) times daily. , Disp: 60 capsule, Rfl: 0

## 2020-05-17 RX ORDER — GABAPENTIN 100 MG/1
100 CAPSULE ORAL 2 TIMES DAILY
Qty: 180 CAPSULE | Refills: 1 | Status: SHIPPED | OUTPATIENT
Start: 2020-05-17 | End: 2020-08-24

## 2020-06-09 RX ORDER — FLUOXETINE 10 MG/1
CAPSULE ORAL
Qty: 90 CAPSULE | Refills: 1 | Status: SHIPPED | OUTPATIENT
Start: 2020-06-09 | End: 2020-12-21

## 2020-07-01 ENCOUNTER — TELEPHONE (OUTPATIENT)
Dept: INTERNAL MEDICINE CLINIC | Facility: CLINIC | Age: 50
End: 2020-07-01

## 2020-07-01 ENCOUNTER — NURSE TRIAGE (OUTPATIENT)
Dept: INTERNAL MEDICINE CLINIC | Facility: CLINIC | Age: 50
End: 2020-07-01

## 2020-07-01 ENCOUNTER — VIRTUAL PHONE E/M (OUTPATIENT)
Dept: INTERNAL MEDICINE CLINIC | Facility: CLINIC | Age: 50
End: 2020-07-01

## 2020-07-01 DIAGNOSIS — R09.82 ALLERGIC RHINITIS WITH POSTNASAL DRIP: Primary | ICD-10-CM

## 2020-07-01 DIAGNOSIS — J30.9 ALLERGIC RHINITIS WITH POSTNASAL DRIP: Primary | ICD-10-CM

## 2020-07-01 DIAGNOSIS — J45.991 COUGH VARIANT ASTHMA: ICD-10-CM

## 2020-07-01 PROCEDURE — 99213 OFFICE O/P EST LOW 20 MIN: CPT | Performed by: INTERNAL MEDICINE

## 2020-07-01 RX ORDER — FLUTICASONE PROPIONATE 50 MCG
SPRAY, SUSPENSION (ML) NASAL
Qty: 1 BOTTLE | Refills: 3 | Status: SHIPPED | OUTPATIENT
Start: 2020-07-01 | End: 2020-08-24

## 2020-07-01 RX ORDER — AZELASTINE HYDROCHLORIDE 137 UG/1
1 SPRAY, METERED NASAL 2 TIMES DAILY
Qty: 30 ML | Refills: 1 | Status: SHIPPED | OUTPATIENT
Start: 2020-07-01 | End: 2021-10-20 | Stop reason: ALTCHOICE

## 2020-07-01 RX ORDER — ALBUTEROL SULFATE 90 UG/1
2 AEROSOL, METERED RESPIRATORY (INHALATION) EVERY 6 HOURS PRN
Qty: 1 INHALER | Refills: 6 | Status: SHIPPED | OUTPATIENT
Start: 2020-07-01

## 2020-07-01 RX ORDER — ALBUTEROL SULFATE 90 UG/1
2 AEROSOL, METERED RESPIRATORY (INHALATION) EVERY 4 HOURS PRN
Qty: 1 INHALER | Refills: 6 | Status: SHIPPED | OUTPATIENT
Start: 2020-07-01 | End: 2020-07-01

## 2020-07-01 RX ORDER — METHYLPREDNISOLONE 4 MG/1
TABLET ORAL
Qty: 1 PACKAGE | Refills: 0 | Status: SHIPPED | OUTPATIENT
Start: 2020-07-01 | End: 2020-08-24

## 2020-07-01 NOTE — PATIENT INSTRUCTIONS
Allergic Rhinitis  Allergic rhinitis is an allergic reaction that affects the nose, and often the eyes. It’s often known as nasal allergies. Nasal allergies are often due to things in the environment that are breathed in.  Depending what you are sensitive t · Keep humidity low by using a dehumidifier or air conditioner. Keep the dehumidifier and air conditioner clean and free of mold. · Clean moldy areas with bleach and water. In general:  · Vacuum once or twice a week.  If possible, use a vacuum with a high Common nasal allergy symptoms  Allergies can cause nasal tissue to swell. This makes the air passages smaller. The nose may feel stuffed up. The nose may also make extra mucus, which can plug the nasal passages or drip out of the nose.  Mucus can drip down House-dust mites are tiny bugs too small to see. They can live in mattresses, blankets, stuffed toys, carpets, and curtains. The droppings of these mites are a common indoor cause of nasal allergies. Mold  Mold loves dark, damp areas.  It tends to grow in · Cover your mattress, box spring, and pillows in allergy-proof casings. Housecleaning  Here are some tips:  · Wash sheets, blankets, and mattress pads every 1 to 2 weeks in hot water (at least 130°F).   · Remove stuffed animals and other things that colle Decongestants reduce swelling of the nasal passages. They relieve pressure and pain in your sinuses. Decongestants:  · Are available as pills, liquids, and nasal sprays  · Should not be used for more than a few days.  Overuse can actually make your symptom Talk to your pediatrician regarding the use of this medicine in children. Special care may be needed. Some products have been used for allergies in children as young as 2 years.  After 2 months of daily use without a prescription in a child, talk to your pe · infection, like tuberculosis, herpes, or fungal infection  · recent surgery on nose or sinuses  · taking a corticosteroid by mouth  · an unusual or allergic reaction to fluticasone, steroids, other medicines, foods, dyes, or preservatives  · pregnant or

## 2020-07-01 NOTE — PROGRESS NOTES
Virtual Telephone Check-In    Yessi Fee [facility]. Patient understands and accepts financial responsibility for any deductible, co-insurance and/or co-pays associated with this service.      I s pain.    Eyes: Positive for itching. Respiratory: Positive for cough. Negative for shortness of breath and wheezing. Gastrointestinal: Positive for heartburn (nexium 2nd hiatal hernia). Allergic/Immunologic: Positive for environmental allergies.    P Tobacco comment: per NG    Alcohol use:  Yes      Alcohol/week: 0.0 standard drinks      Comment: socially - 2 beers/week    Drug use: No     Reviewed Current Medications:  Current Outpatient Medications   Medication Sig Dispense Refill   • Fluticasone Prop respiratory distress. He has no wheezes. Frequent dry coughing noted   Neurological: He is alert and oriented to person, place, and time. Psychiatric: His speech is normal and behavior is normal. Thought content normal.         Diagnosis:  1.  Allergic effort was taken to allow for sufficient and adequate time. This billing was spent on reviewing labs, medications, radiology tests and decision making. Appropriate medical decision-making and tests are ordered as detailed in the plan of care above.   Jocelyn

## 2020-07-01 NOTE — TELEPHONE ENCOUNTER
[facility] [id number] [facility] [employer] calling to get clarification for the medication below:    •  Albuterol Sulfate HFA (PROAIR HFA) 108 (90 Base) MCG/ACT Inhalation Aero Soln, Inhale 2 puffs into the lungs every 6 (six) hours as needed for Wheezing or Shortness of

## 2020-07-01 NOTE — TELEPHONE ENCOUNTER
Action Requested: Summary for Provider     []  Critical Lab, Recommendations Needed  [] Need Additional Advice  [x]   FYI    []   Need Orders  [] Need Medications Sent to Pharmacy  []  Other     SUMMARY: For the past 6-8 weeks patient has had a persistent

## 2020-08-24 ENCOUNTER — OFFICE VISIT (OUTPATIENT)
Dept: INTERNAL MEDICINE CLINIC | Facility: CLINIC | Age: 50
End: 2020-08-24

## 2020-08-24 VITALS
RESPIRATION RATE: 16 BRPM | HEIGHT: 73 IN | SYSTOLIC BLOOD PRESSURE: 175 MMHG | BODY MASS INDEX: 25.98 KG/M2 | HEART RATE: 99 BPM | DIASTOLIC BLOOD PRESSURE: 100 MMHG | WEIGHT: 196 LBS

## 2020-08-24 DIAGNOSIS — Z00.00 PHYSICAL EXAM, ANNUAL: Primary | ICD-10-CM

## 2020-08-24 PROCEDURE — 99396 PREV VISIT EST AGE 40-64: CPT | Performed by: INTERNAL MEDICINE

## 2020-08-24 PROCEDURE — 3080F DIAST BP >= 90 MM HG: CPT | Performed by: INTERNAL MEDICINE

## 2020-08-24 PROCEDURE — 3008F BODY MASS INDEX DOCD: CPT | Performed by: INTERNAL MEDICINE

## 2020-08-24 PROCEDURE — 3077F SYST BP >= 140 MM HG: CPT | Performed by: INTERNAL MEDICINE

## 2020-08-24 NOTE — PROGRESS NOTES
Yenifer Vogel is a 51year old male who presents for a complete physical exam.   HPI:   Pt complains of less sleep issues. Fluoxetine has helped.        Immunization History  Administered            Date(s) Administered    FLUZONE 6 months and older PFS 0.5 Cap TAKE 1 CAPSULE BY MOUTH EVERY DAY 90 capsule 1   • ESOMEPRAZOLE MAGNESIUM 40 MG Oral Capsule Delayed Release TAKE 1 CAPSULE (40 MG TOTAL) BY MOUTH DAILY.  90 capsule 0   • Fluticasone Propionate 50 MCG/ACT Nasal Suspension 1-2 sprays into each nostril d OS; per NG      Past Surgical History:   Procedure Laterality Date   • COLONOSCOPY  2016   • EGD     • ELECTROCARDIOGRAM, COMPLETE  01-    SCANNED TO MEDIA TAB: 01-   • EXCISION TURBINATE,SUBMUCOUS  04/12/2018   • EXTRACTION ERUPTED TOOTH/ denies depression or anxiety  HEMATOLOGIC: denies hx of anemia  ENDOCRINE: denies thyroid history  ALL/ASTHMA: denies hx of allergy or asthma    EXAM:   BP (!) 175/100 (BP Location: Right arm, Patient Position: Sitting, Cuff Size: large)   Pulse 99   Resp

## 2020-08-25 ENCOUNTER — LAB ENCOUNTER (OUTPATIENT)
Dept: LAB | Age: 50
End: 2020-08-25
Attending: INTERNAL MEDICINE
Payer: COMMERCIAL

## 2020-08-25 DIAGNOSIS — Z00.00 PHYSICAL EXAM, ANNUAL: ICD-10-CM

## 2020-08-25 LAB
ALBUMIN SERPL-MCNC: 3.6 G/DL (ref 3.4–5)
ALBUMIN/GLOB SERPL: 0.9 {RATIO} (ref 1–2)
ALP LIVER SERPL-CCNC: 79 U/L (ref 45–117)
ALT SERPL-CCNC: 26 U/L (ref 16–61)
ANION GAP SERPL CALC-SCNC: 8 MMOL/L (ref 0–18)
AST SERPL-CCNC: 18 U/L (ref 15–37)
BASOPHILS # BLD AUTO: 0.02 X10(3) UL (ref 0–0.2)
BASOPHILS NFR BLD AUTO: 0.4 %
BILIRUB SERPL-MCNC: 0.4 MG/DL (ref 0.1–2)
BILIRUB UR QL: NEGATIVE
BUN BLD-MCNC: 16 MG/DL (ref 7–18)
BUN/CREAT SERPL: 12.5 (ref 10–20)
CALCIUM BLD-MCNC: 8.7 MG/DL (ref 8.5–10.1)
CHLORIDE SERPL-SCNC: 111 MMOL/L (ref 98–112)
CHOLEST SMN-MCNC: 173 MG/DL (ref ?–200)
CLARITY UR: CLEAR
CO2 SERPL-SCNC: 26 MMOL/L (ref 21–32)
COLOR UR: YELLOW
COMPLEXED PSA SERPL-MCNC: 2.12 NG/ML (ref ?–4)
CREAT BLD-MCNC: 1.28 MG/DL (ref 0.7–1.3)
DEPRECATED RDW RBC AUTO: 41 FL (ref 35.1–46.3)
EOSINOPHIL # BLD AUTO: 0.13 X10(3) UL (ref 0–0.7)
EOSINOPHIL NFR BLD AUTO: 2.4 %
ERYTHROCYTE [DISTWIDTH] IN BLOOD BY AUTOMATED COUNT: 12.5 % (ref 11–15)
GLOBULIN PLAS-MCNC: 3.8 G/DL (ref 2.8–4.4)
GLUCOSE BLD-MCNC: 94 MG/DL (ref 70–99)
GLUCOSE UR-MCNC: NEGATIVE MG/DL
HCT VFR BLD AUTO: 45.3 % (ref 39–53)
HDLC SERPL-MCNC: 36 MG/DL (ref 40–59)
HGB BLD-MCNC: 14.6 G/DL (ref 13–17.5)
IMM GRANULOCYTES # BLD AUTO: 0.01 X10(3) UL (ref 0–1)
IMM GRANULOCYTES NFR BLD: 0.2 %
KETONES UR-MCNC: NEGATIVE MG/DL
LDLC SERPL CALC-MCNC: 92 MG/DL (ref ?–100)
LEUKOCYTE ESTERASE UR QL STRIP.AUTO: NEGATIVE
LYMPHOCYTES # BLD AUTO: 1.36 X10(3) UL (ref 1–4)
LYMPHOCYTES NFR BLD AUTO: 24.7 %
M PROTEIN MFR SERPL ELPH: 7.4 G/DL (ref 6.4–8.2)
MCH RBC QN AUTO: 29 PG (ref 26–34)
MCHC RBC AUTO-ENTMCNC: 32.2 G/DL (ref 31–37)
MCV RBC AUTO: 89.9 FL (ref 80–100)
MONOCYTES # BLD AUTO: 0.39 X10(3) UL (ref 0.1–1)
MONOCYTES NFR BLD AUTO: 7.1 %
NEUTROPHILS # BLD AUTO: 3.6 X10 (3) UL (ref 1.5–7.7)
NEUTROPHILS # BLD AUTO: 3.6 X10(3) UL (ref 1.5–7.7)
NEUTROPHILS NFR BLD AUTO: 65.2 %
NITRITE UR QL STRIP.AUTO: NEGATIVE
NONHDLC SERPL-MCNC: 137 MG/DL (ref ?–130)
OSMOLALITY SERPL CALC.SUM OF ELEC: 301 MOSM/KG (ref 275–295)
PATIENT FASTING Y/N/NP: YES
PATIENT FASTING Y/N/NP: YES
PH UR: 5 [PH] (ref 5–8)
PLATELET # BLD AUTO: 172 10(3)UL (ref 150–450)
POTASSIUM SERPL-SCNC: 4 MMOL/L (ref 3.5–5.1)
PROT UR-MCNC: NEGATIVE MG/DL
RBC # BLD AUTO: 5.04 X10(6)UL (ref 4.3–5.7)
RBC #/AREA URNS AUTO: 1 /HPF
SODIUM SERPL-SCNC: 145 MMOL/L (ref 136–145)
SP GR UR STRIP: 1.03 (ref 1–1.03)
TRIGL SERPL-MCNC: 227 MG/DL (ref 30–149)
TSI SER-ACNC: 2.09 MIU/ML (ref 0.36–3.74)
UROBILINOGEN UR STRIP-ACNC: <2
VLDLC SERPL CALC-MCNC: 45 MG/DL (ref 0–30)
WBC # BLD AUTO: 5.5 X10(3) UL (ref 4–11)
WBC #/AREA URNS AUTO: 3 /HPF

## 2020-08-25 PROCEDURE — 36415 COLL VENOUS BLD VENIPUNCTURE: CPT

## 2020-08-25 PROCEDURE — 81001 URINALYSIS AUTO W/SCOPE: CPT

## 2020-08-25 PROCEDURE — 85025 COMPLETE CBC W/AUTO DIFF WBC: CPT

## 2020-08-25 PROCEDURE — 80061 LIPID PANEL: CPT

## 2020-08-25 PROCEDURE — 80053 COMPREHEN METABOLIC PANEL: CPT

## 2020-08-25 PROCEDURE — 84443 ASSAY THYROID STIM HORMONE: CPT

## 2020-10-19 ENCOUNTER — OFFICE VISIT (OUTPATIENT)
Dept: INTERNAL MEDICINE CLINIC | Facility: CLINIC | Age: 50
End: 2020-10-19

## 2020-10-19 VITALS
HEIGHT: 73 IN | WEIGHT: 200 LBS | HEART RATE: 78 BPM | DIASTOLIC BLOOD PRESSURE: 78 MMHG | RESPIRATION RATE: 12 BRPM | TEMPERATURE: 97 F | BODY MASS INDEX: 26.51 KG/M2 | SYSTOLIC BLOOD PRESSURE: 136 MMHG

## 2020-10-19 DIAGNOSIS — R07.2 PRECORDIAL PAIN: Primary | ICD-10-CM

## 2020-10-19 PROCEDURE — 3008F BODY MASS INDEX DOCD: CPT | Performed by: INTERNAL MEDICINE

## 2020-10-19 PROCEDURE — 3075F SYST BP GE 130 - 139MM HG: CPT | Performed by: INTERNAL MEDICINE

## 2020-10-19 PROCEDURE — 93000 ELECTROCARDIOGRAM COMPLETE: CPT | Performed by: INTERNAL MEDICINE

## 2020-10-19 PROCEDURE — 99214 OFFICE O/P EST MOD 30 MIN: CPT | Performed by: INTERNAL MEDICINE

## 2020-10-19 PROCEDURE — 3078F DIAST BP <80 MM HG: CPT | Performed by: INTERNAL MEDICINE

## 2020-10-19 NOTE — PROGRESS NOTES
HPI:    Patient ID: Jono Reyes is a 51year old male. Chest Pain   This is a new problem. The current episode started in the past 7 days (started the day after doing lots of yardwork). The onset quality is sudden. The problem occurs constantly.  The pr • loratadine (CLARITIN) 10 MG Oral Tab Take 10 mg by mouth daily. • EPINEPHrine (EPIPEN) 0.3 MG/0.3ML Injection Solution Auto-injector      • ESOMEPRAZOLE MAGNESIUM 40 MG Oral Capsule Delayed Release TAKE 1 CAPSULE (40 MG TOTAL) BY MOUTH DAILY.  (Jocelyn test and scored 0, no abnormal findngs of thoracic aorta on the overead scan. His current pain also not related to or worsen by physical activity. I told him suspect likely musculoskeletal pain. He can take tylenol prn.  I told him to call back or [facility] wi

## 2020-12-03 ENCOUNTER — NURSE TRIAGE (OUTPATIENT)
Dept: FAMILY MEDICINE CLINIC | Facility: CLINIC | Age: 50
End: 2020-12-03

## 2020-12-03 NOTE — TELEPHONE ENCOUNTER
----- Message from Ria Dong sent at 12/3/2020  2:45 PM CST -----  Regarding: Other  Contact: [phone]  Good afternoon,    I have been feeling several sinus issues for the last couple of days.   Bad sinus headache, eye pain and all that other good s

## 2020-12-03 NOTE — TELEPHONE ENCOUNTER
Action Requested: Summary for Provider     []  Critical Lab, Recommendations Needed  [] Need Additional Advice  []   FYI    []   Need Orders  [x] Need Medications Sent to Pharmacy  []  Other     SUMMARY: Patient states for past 1 week he has sinus pressure

## 2020-12-04 RX ORDER — AZITHROMYCIN 250 MG/1
TABLET, FILM COATED ORAL
Qty: 6 TABLET | Refills: 0 | Status: SHIPPED | OUTPATIENT
Start: 2020-12-04 | End: 2020-12-09

## 2020-12-18 ENCOUNTER — VIRTUAL PHONE E/M (OUTPATIENT)
Dept: INTERNAL MEDICINE CLINIC | Facility: CLINIC | Age: 50
End: 2020-12-18

## 2020-12-18 ENCOUNTER — TELEPHONE (OUTPATIENT)
Dept: INTERNAL MEDICINE CLINIC | Facility: CLINIC | Age: 50
End: 2020-12-18

## 2020-12-18 DIAGNOSIS — H93.8X3 CONGESTION OF BOTH EARS: ICD-10-CM

## 2020-12-18 DIAGNOSIS — J01.90 ACUTE NON-RECURRENT SINUSITIS, UNSPECIFIED LOCATION: Primary | ICD-10-CM

## 2020-12-18 PROCEDURE — 99213 OFFICE O/P EST LOW 20 MIN: CPT | Performed by: INTERNAL MEDICINE

## 2020-12-18 RX ORDER — FLUTICASONE PROPIONATE 50 MCG
2 SPRAY, SUSPENSION (ML) NASAL DAILY
Qty: 2 BOTTLE | Refills: 0 | Status: SHIPPED | OUTPATIENT
Start: 2020-12-18 | End: 2021-02-09

## 2020-12-18 NOTE — TELEPHONE ENCOUNTER
Patient called, he cannot do video as he's at work, can do a phone visit appt. Appt changed to phone visit, confirmed. Advised of phone visit process, billing; patient verbalized understanding.

## 2020-12-18 NOTE — PROGRESS NOTES
HPI:    Patient ID: Magali Thomas is a 53year old male. Please note that the following visit was completed using two-way, real-time interactive audio and/or video communication.   This has been done in good shukri to provide continuity of care in the best i Albuterol Sulfate HFA (PROAIR HFA) 108 (90 Base) MCG/ACT Inhalation Aero Soln Inhale 2 puffs into the lungs every 6 (six) hours as needed for Wheezing or Shortness of Breath (or frequent coughing).  1 Inhaler 6   • FLUOXETINE HCL 10 MG Oral Cap TAKE 1 CAPSU Relation Age of Onset   • Diabetes Father 79        per NG   • Obesity Father         per NG   • Other (cardiac MI) Father    • Hypertension Mother         per NG   • Obesity Mother         per NG   • Thyroid Disorder Mother         per NG   • Heart Disord could be performed. Every conscious effort was taken to allow for sufficient and adequate time. This billing was spent on reviewing labs, medications, radiology tests and decision making.   Appropriate medical decision-making and tests are ordered as deta

## 2020-12-21 RX ORDER — FLUOXETINE 10 MG/1
CAPSULE ORAL
Qty: 90 CAPSULE | Refills: 1 | Status: SHIPPED | OUTPATIENT
Start: 2020-12-21 | End: 2021-06-15

## 2021-01-08 ENCOUNTER — HOSPITAL ENCOUNTER (OUTPATIENT)
Age: 51
Discharge: HOME | End: 2021-01-08
Payer: COMMERCIAL

## 2021-01-08 VITALS
HEART RATE: 87 BPM | WEIGHT: 200 LBS | TEMPERATURE: 98 F | SYSTOLIC BLOOD PRESSURE: 144 MMHG | OXYGEN SATURATION: 98 % | HEIGHT: 73 IN | DIASTOLIC BLOOD PRESSURE: 94 MMHG | BODY MASS INDEX: 26.51 KG/M2 | RESPIRATION RATE: 16 BRPM

## 2021-01-08 DIAGNOSIS — S16.1XXA STRAIN OF NECK MUSCLE, INITIAL ENCOUNTER: Primary | ICD-10-CM

## 2021-01-08 DIAGNOSIS — R03.0 ELEVATED BLOOD PRESSURE READING: ICD-10-CM

## 2021-01-08 PROCEDURE — 99203 OFFICE O/P NEW LOW 30 MIN: CPT | Performed by: PHYSICIAN ASSISTANT

## 2021-01-08 RX ORDER — IBUPROFEN 600 MG/1
TABLET ORAL
Qty: 20 TABLET | Refills: 0 | Status: SHIPPED | OUTPATIENT
Start: 2021-01-08 | End: 2021-03-19

## 2021-01-08 RX ORDER — CYCLOBENZAPRINE HCL 10 MG
10 TABLET ORAL 3 TIMES DAILY PRN
Qty: 14 TABLET | Refills: 0 | Status: SHIPPED | OUTPATIENT
Start: 2021-01-08 | End: 2021-01-15

## 2021-01-08 NOTE — ED INITIAL ASSESSMENT (HPI)
C/o neck pain since this morning. Pain is worse with most movements. Was lifting heavy weights yesterday. Also c/o bilateral ear discomfort.

## 2021-01-08 NOTE — ED PROVIDER NOTES
Patient Seen in: [facility]    History   Patient presents with:  Neck Pain    Stated Complaint: neck pain    HPI    Duncan Ruiz is a 53year old male who presents with chief complaint of left neck pain. Onset this morning.   Denies eliciting • UPPER GI ENDOSCOPY PERFORMED         Medications :   ibuprofen 600 MG Oral Tab,  Take 1 tablet (600 mg total) by mouth every 6 hours with food   cyclobenzaprine 10 MG Oral Tab,  Take 1 tablet (10 mg total) by mouth 3 (three) times daily as needed for M Systems    Positive for stated complaint: neck pain  Other systems are as noted in HPI. Constitutional and vital signs reviewed. All other systems reviewed and negative except as noted above.     PSFH elements reviewed from today and agreed except as no obvious deformity. Neurological: Cranial nerve II through XII intact. DTRs intact and symmetrical bilaterally. Bowel function is intact. Sensation intact to light touch. Strength and range of motion symmetrical of all extremities x4.  Patient exhibits n

## 2021-01-11 ENCOUNTER — OFFICE VISIT (OUTPATIENT)
Dept: INTERNAL MEDICINE CLINIC | Facility: CLINIC | Age: 51
End: 2021-01-11

## 2021-01-11 VITALS
WEIGHT: 201 LBS | RESPIRATION RATE: 16 BRPM | HEIGHT: 73 IN | HEART RATE: 103 BPM | BODY MASS INDEX: 26.64 KG/M2 | SYSTOLIC BLOOD PRESSURE: 159 MMHG | DIASTOLIC BLOOD PRESSURE: 114 MMHG

## 2021-01-11 DIAGNOSIS — M54.2 CERVICALGIA: Primary | ICD-10-CM

## 2021-01-11 DIAGNOSIS — R03.0 ELEVATED BLOOD PRESSURE READING: ICD-10-CM

## 2021-01-11 DIAGNOSIS — M25.561 RIGHT KNEE PAIN, UNSPECIFIED CHRONICITY: ICD-10-CM

## 2021-01-11 DIAGNOSIS — H93.13 TINNITUS OF BOTH EARS: ICD-10-CM

## 2021-01-11 PROCEDURE — 3008F BODY MASS INDEX DOCD: CPT | Performed by: INTERNAL MEDICINE

## 2021-01-11 PROCEDURE — 99214 OFFICE O/P EST MOD 30 MIN: CPT | Performed by: INTERNAL MEDICINE

## 2021-01-11 PROCEDURE — 3080F DIAST BP >= 90 MM HG: CPT | Performed by: INTERNAL MEDICINE

## 2021-01-11 PROCEDURE — 3077F SYST BP >= 140 MM HG: CPT | Performed by: INTERNAL MEDICINE

## 2021-01-11 NOTE — PROGRESS NOTES
Zaida Martino is a 53year old male. HPI:     1. Cervicalgia    Has had a pain 8/10  In left neck the last 4 days. Has been unable to sleep. Friday awoke and the tightness cam on during the day.    Had lifted weights the day before in the afternoon but awok both eyes with astigmatism 2010    OU; per NG   • Prostatitis     per NG   • Sinus problem     Sinus problems; per NG   • Sleep apnea    • Superficial punctate keratitis 2010    OS; per NG      Social History:  Social History    Tobacco Use      Smoking st the neck in the evening. Slowly move the neck and attempt to slowly improve the range of motion if not too painful.     2. Right knee pain    Ice joint and elevate area intermittently as tolerated for short periods of time to decrease any inflammation and g

## 2021-01-19 ENCOUNTER — OFFICE VISIT (OUTPATIENT)
Dept: OTOLARYNGOLOGY | Facility: CLINIC | Age: 51
End: 2021-01-19

## 2021-01-19 VITALS
DIASTOLIC BLOOD PRESSURE: 95 MMHG | HEIGHT: 72 IN | WEIGHT: 201 LBS | BODY MASS INDEX: 27.22 KG/M2 | TEMPERATURE: 98 F | SYSTOLIC BLOOD PRESSURE: 137 MMHG

## 2021-01-19 DIAGNOSIS — H93.13 TINNITUS OF BOTH EARS: Primary | ICD-10-CM

## 2021-01-19 PROCEDURE — 3008F BODY MASS INDEX DOCD: CPT | Performed by: OTOLARYNGOLOGY

## 2021-01-19 PROCEDURE — 3075F SYST BP GE 130 - 139MM HG: CPT | Performed by: OTOLARYNGOLOGY

## 2021-01-19 PROCEDURE — 3080F DIAST BP >= 90 MM HG: CPT | Performed by: OTOLARYNGOLOGY

## 2021-01-19 PROCEDURE — 99213 OFFICE O/P EST LOW 20 MIN: CPT | Performed by: OTOLARYNGOLOGY

## 2021-02-09 RX ORDER — FLUTICASONE PROPIONATE 50 MCG
2 SPRAY, SUSPENSION (ML) NASAL DAILY
Qty: 1 BOTTLE | Refills: 2 | Status: SHIPPED | OUTPATIENT
Start: 2021-02-09

## 2021-03-10 ENCOUNTER — HOSPITAL ENCOUNTER (OUTPATIENT)
Age: 51
Discharge: HOME | End: 2021-03-10
Attending: PHYSICIAN ASSISTANT
Payer: COMMERCIAL

## 2021-03-10 ENCOUNTER — APPOINTMENT (OUTPATIENT)
Dept: GENERAL RADIOLOGY | Age: 51
End: 2021-03-10
Attending: PHYSICIAN ASSISTANT
Payer: COMMERCIAL

## 2021-03-10 VITALS
TEMPERATURE: 98 F | DIASTOLIC BLOOD PRESSURE: 91 MMHG | OXYGEN SATURATION: 99 % | RESPIRATION RATE: 18 BRPM | HEART RATE: 102 BPM | SYSTOLIC BLOOD PRESSURE: 130 MMHG

## 2021-03-10 DIAGNOSIS — S20.212A RIB CONTUSION, LEFT, INITIAL ENCOUNTER: Primary | ICD-10-CM

## 2021-03-10 DIAGNOSIS — R03.0 ELEVATED BLOOD PRESSURE READING: ICD-10-CM

## 2021-03-10 PROCEDURE — 71101 X-RAY EXAM UNILAT RIBS/CHEST: CPT | Performed by: PHYSICIAN ASSISTANT

## 2021-03-10 PROCEDURE — 99213 OFFICE O/P EST LOW 20 MIN: CPT | Performed by: PHYSICIAN ASSISTANT

## 2021-03-10 RX ORDER — IBUPROFEN 600 MG/1
TABLET ORAL
Qty: 20 TABLET | Refills: 0 | Status: SHIPPED | OUTPATIENT
Start: 2021-03-10

## 2021-03-10 NOTE — ED PROVIDER NOTES
Patient Seen in: [facility]    History   Patient presents with:  Musculoskeletal Problem    Stated Complaint: LEFT RIB INJURY    HPI    28-year-old male presents with chief complaint of left lateral rib pain. Onset 2 weeks ago.   Patient stat Cap,  TAKE 1 CAPSULE BY MOUTH EVERY DAY   Fluticasone Propionate 50 MCG/ACT Nasal Suspension,  2 sprays by Nasal route daily.    ibuprofen 600 MG Oral Tab,  Take 1 tablet (600 mg total) by mouth every 6 hours with food  Patient not taking: Reported on 1/11/ except as noted above. PSFH elements reviewed from today and agreed except as otherwise stated in HPI.     Physical Exam     ED Triage Vitals [03/10/21 1611]   BP (!) 130/91   Pulse 102   Resp 18   Temp 98.1 °F (36.7 °C)   Temp src Temporal   SpO2 99 % CHEST (3 VIEWS), LEFT  (CPT=71101)    Result Date: 3/10/2021  CONCLUSION: Normal examination.      Dictated by (CST): Shasha Lynch MD on 3/10/2021 at 4:34 PM     Finalized by (CST): Shasha Lynch MD on 3/10/2021 at 4:38 PM          Physical exam remained

## 2021-03-10 NOTE — ED INITIAL ASSESSMENT (HPI)
Pt presents with pain on left side ribcage. Pt states he injured ribs 2 weeks ago while playing basketball.  Denies SOB

## 2021-03-13 DIAGNOSIS — Z23 NEED FOR VACCINATION: ICD-10-CM

## 2021-03-18 ENCOUNTER — HOSPITAL ENCOUNTER (OUTPATIENT)
Age: 51
Discharge: HOME | End: 2021-03-18
Payer: COMMERCIAL

## 2021-03-18 ENCOUNTER — NURSE TRIAGE (OUTPATIENT)
Dept: INTERNAL MEDICINE CLINIC | Facility: CLINIC | Age: 51
End: 2021-03-18

## 2021-03-18 VITALS
DIASTOLIC BLOOD PRESSURE: 106 MMHG | BODY MASS INDEX: 26.51 KG/M2 | RESPIRATION RATE: 18 BRPM | HEIGHT: 73 IN | WEIGHT: 200 LBS | OXYGEN SATURATION: 99 % | TEMPERATURE: 98 F | SYSTOLIC BLOOD PRESSURE: 158 MMHG | HEART RATE: 98 BPM

## 2021-03-18 DIAGNOSIS — R03.0 ELEVATED BLOOD PRESSURE READING: ICD-10-CM

## 2021-03-18 DIAGNOSIS — Z20.822 ENCOUNTER FOR LABORATORY TESTING FOR COVID-19 VIRUS: Primary | ICD-10-CM

## 2021-03-18 DIAGNOSIS — Z20.822 LAB TEST NEGATIVE FOR COVID-19 VIRUS: ICD-10-CM

## 2021-03-18 LAB — SARS-COV-2 RNA RESP QL NAA+PROBE: NOT DETECTED

## 2021-03-18 PROCEDURE — U0002 COVID-19 LAB TEST NON-CDC: HCPCS | Performed by: NURSE PRACTITIONER

## 2021-03-18 PROCEDURE — 99213 OFFICE O/P EST LOW 20 MIN: CPT | Performed by: NURSE PRACTITIONER

## 2021-03-19 ENCOUNTER — OFFICE VISIT (OUTPATIENT)
Dept: INTERNAL MEDICINE CLINIC | Facility: CLINIC | Age: 51
End: 2021-03-19

## 2021-03-19 VITALS
WEIGHT: 207 LBS | DIASTOLIC BLOOD PRESSURE: 92 MMHG | HEIGHT: 73 IN | RESPIRATION RATE: 16 BRPM | BODY MASS INDEX: 27.43 KG/M2 | HEART RATE: 96 BPM | SYSTOLIC BLOOD PRESSURE: 124 MMHG

## 2021-03-19 DIAGNOSIS — I10 ESSENTIAL HYPERTENSION WITH GOAL BLOOD PRESSURE LESS THAN 140/90: ICD-10-CM

## 2021-03-19 DIAGNOSIS — R55 VASOVAGAL SYNCOPE: Primary | ICD-10-CM

## 2021-03-19 PROCEDURE — 3080F DIAST BP >= 90 MM HG: CPT | Performed by: INTERNAL MEDICINE

## 2021-03-19 PROCEDURE — 3008F BODY MASS INDEX DOCD: CPT | Performed by: INTERNAL MEDICINE

## 2021-03-19 PROCEDURE — 99214 OFFICE O/P EST MOD 30 MIN: CPT | Performed by: INTERNAL MEDICINE

## 2021-03-19 PROCEDURE — 3074F SYST BP LT 130 MM HG: CPT | Performed by: INTERNAL MEDICINE

## 2021-03-19 RX ORDER — LISINOPRIL 5 MG/1
5 TABLET ORAL DAILY
Qty: 90 TABLET | Refills: 3 | Status: SHIPPED | OUTPATIENT
Start: 2021-03-19 | End: 2021-04-21

## 2021-03-19 NOTE — ED PROVIDER NOTES
Patient presents with:  Covid-19 Test      HPI:     Carolyn Kohler is a 53year old male who presents for a COVID test. He had a possible exposure 4 days ago. He denies any symptoms or complaints. He appears well and nontoxic.     [facility] asessment screens Concern: Not Asked        Weight Concern: Not Asked        Special Diet: Not Asked        Back Care: Not Asked        Exercise: Not Asked        Bike Helmet: Not Asked        Seat Belt: Not Asked        Self-Exams: Not Asked    Social History Narrative MDM/Assessment/Plan:   Orders for this encounter:  Orders Placed This Encounter      Rapid SARS-CoV-2 by PCR STAT          Order Specific Question: Release to patient          Answer: Immediate      Contact and droplet isolation status Continuous

## 2021-03-19 NOTE — PROGRESS NOTES
Mik Love is a 53year old male. HPI:     1. Fainting    This past Wednesday had 2 beers for dinner and at 2 in the morning he went to the bathroom and felt lightheaded and missed the toilet and sat on the floor.  Had feeling he had to move bowels when per NG   • Sinus problem     Sinus problems; per NG   • Sleep apnea    • Superficial punctate keratitis 2010    OS; per NG      Social History:  Social History    Tobacco Use      Smoking status: Never Smoker      Smokeless tobacco: Never Used      [place] his blood pressure over the next several weeks. If the patient has any recurrent symptoms like this he will get a Holter monitor placed to monitor the heart rhythm.     2. Essential hypertension with goal blood pressure less than 140/90    Patient instruct

## 2021-04-21 ENCOUNTER — OFFICE VISIT (OUTPATIENT)
Dept: INTERNAL MEDICINE CLINIC | Facility: CLINIC | Age: 51
End: 2021-04-21

## 2021-04-21 VITALS
HEIGHT: 73 IN | HEART RATE: 71 BPM | DIASTOLIC BLOOD PRESSURE: 86 MMHG | RESPIRATION RATE: 16 BRPM | SYSTOLIC BLOOD PRESSURE: 138 MMHG | WEIGHT: 205 LBS | BODY MASS INDEX: 27.17 KG/M2

## 2021-04-21 DIAGNOSIS — I10 ESSENTIAL HYPERTENSION WITH GOAL BLOOD PRESSURE LESS THAN 140/90: Primary | ICD-10-CM

## 2021-04-21 DIAGNOSIS — R10.31 RIGHT LOWER QUADRANT ABDOMINAL PAIN: ICD-10-CM

## 2021-04-21 PROCEDURE — 3075F SYST BP GE 130 - 139MM HG: CPT | Performed by: INTERNAL MEDICINE

## 2021-04-21 PROCEDURE — 3079F DIAST BP 80-89 MM HG: CPT | Performed by: INTERNAL MEDICINE

## 2021-04-21 PROCEDURE — 99214 OFFICE O/P EST MOD 30 MIN: CPT | Performed by: INTERNAL MEDICINE

## 2021-04-21 PROCEDURE — 3008F BODY MASS INDEX DOCD: CPT | Performed by: INTERNAL MEDICINE

## 2021-04-21 RX ORDER — LOSARTAN POTASSIUM 50 MG/1
50 TABLET ORAL DAILY
Qty: 90 TABLET | Refills: 3 | Status: SHIPPED | OUTPATIENT
Start: 2021-04-21 | End: 2022-04-21

## 2021-04-21 NOTE — PROGRESS NOTES
Munir Coleman is a 53year old male. HPI:     1. Essential hypertension with goal blood pressure less than 140/90    Patient has been following low salt diet and has been taking anti-hypertensive prescriptions as prescribed.  Blood pressure has been checked Auto-injector         Past Medical History:   Diagnosis Date   • Allergic rhinitis    • Anxiety    • Blepharitis of both eyes 2010    OU; per NG   • GERD (gastroesophageal reflux disease) 2000    PPI; per NG   • History of pneumonia     per NG   • History situation. He is still quite anxious about his medical conditions  Patient instructed to start taking NEW anti-hypertensive losartan 50 mg exactly as prescribed and to follow a low salt diet as discussed.  Regular exercise at least 3 times weekly will help

## 2021-06-15 RX ORDER — FLUOXETINE 10 MG/1
10 CAPSULE ORAL DAILY
Qty: 90 CAPSULE | Refills: 1 | Status: SHIPPED | OUTPATIENT
Start: 2021-06-15

## 2021-06-15 NOTE — TELEPHONE ENCOUNTER
Current Outpatient Medications:   •  FLUOXETINE HCL 10 MG Oral Cap, TAKE 1 CAPSULE BY MOUTH EVERY DAY, Disp: 90 capsule, Rfl: 1  •

## 2021-08-30 ENCOUNTER — OFFICE VISIT (OUTPATIENT)
Dept: INTERNAL MEDICINE CLINIC | Facility: CLINIC | Age: 51
End: 2021-08-30

## 2021-08-30 VITALS
RESPIRATION RATE: 12 BRPM | HEART RATE: 84 BPM | HEIGHT: 73 IN | WEIGHT: 206 LBS | SYSTOLIC BLOOD PRESSURE: 126 MMHG | TEMPERATURE: 99 F | BODY MASS INDEX: 27.3 KG/M2 | DIASTOLIC BLOOD PRESSURE: 80 MMHG

## 2021-08-30 DIAGNOSIS — R42 DIZZINESS: Primary | ICD-10-CM

## 2021-08-30 DIAGNOSIS — L98.9 SKIN LESION OF NECK: ICD-10-CM

## 2021-08-30 PROCEDURE — 99214 OFFICE O/P EST MOD 30 MIN: CPT | Performed by: INTERNAL MEDICINE

## 2021-08-30 PROCEDURE — 3074F SYST BP LT 130 MM HG: CPT | Performed by: INTERNAL MEDICINE

## 2021-08-30 PROCEDURE — 3008F BODY MASS INDEX DOCD: CPT | Performed by: INTERNAL MEDICINE

## 2021-08-30 PROCEDURE — 3079F DIAST BP 80-89 MM HG: CPT | Performed by: INTERNAL MEDICINE

## 2021-08-30 NOTE — PROGRESS NOTES
HPI/Subjective:     Patient ID: Josette Villanueva is a 53year old male. Dizziness  This is a chronic problem. The current episode started more than 1 year ago. The problem occurs rarely. The problem has been waxing and waning.  Pertinent negatives include no tablet 0   • Azelastine HCl 137 MCG/SPRAY Nasal Solution 1 spray by Nasal route 2 (two) times a day.  (Patient not taking: Reported on 8/30/2021 ) 30 mL 1   • EPINEPHrine (EPIPEN) 0.3 MG/0.3ML Injection Solution Auto-injector        Allergies:  Bee Paternal Grandfather    • Obesity Sister         per NG   • Heart Disease Sister 37        Congestive heart disease; per NG   • Other (Other) Maternal Grandmother    • Other (Other) Maternal Grandfather    • Other (Other) Paternal Grandmother       Social Neurological:      Mental Status: He is alert. Assessment & Plan:   (R42) Dizziness  (primary encounter diagnosis)  Plan: NEURO - INTERNAL        Chronic dizziness for 4 yrs, was evaluated by ENT in the past, mri brain negative before.  I had advi

## 2021-09-09 ENCOUNTER — APPOINTMENT (OUTPATIENT)
Dept: URBAN - METROPOLITAN AREA CLINIC 321 | Age: 51
Setting detail: DERMATOLOGY
End: 2021-09-11

## 2021-09-09 DIAGNOSIS — L81.9 DISORDER OF PIGMENTATION, UNSPECIFIED: ICD-10-CM

## 2021-09-09 DIAGNOSIS — D22 MELANOCYTIC NEVI: ICD-10-CM

## 2021-09-09 DIAGNOSIS — L81.4 OTHER MELANIN HYPERPIGMENTATION: ICD-10-CM

## 2021-09-09 DIAGNOSIS — L82.1 OTHER SEBORRHEIC KERATOSIS: ICD-10-CM

## 2021-09-09 PROBLEM — D22.5 MELANOCYTIC NEVI OF TRUNK: Status: ACTIVE | Noted: 2021-09-09

## 2021-09-09 PROBLEM — D22.61 MELANOCYTIC NEVI OF RIGHT UPPER LIMB, INCLUDING SHOULDER: Status: ACTIVE | Noted: 2021-09-09

## 2021-09-09 PROBLEM — D22.62 MELANOCYTIC NEVI OF LEFT UPPER LIMB, INCLUDING SHOULDER: Status: ACTIVE | Noted: 2021-09-09

## 2021-09-09 PROCEDURE — OTHER COUNSELING: OTHER

## 2021-09-09 PROCEDURE — 99203 OFFICE O/P NEW LOW 30 MIN: CPT

## 2021-09-09 ASSESSMENT — LOCATION DETAILED DESCRIPTION DERM
LOCATION DETAILED: RIGHT ANTECUBITAL SKIN
LOCATION DETAILED: LEFT ANTECUBITAL SKIN
LOCATION DETAILED: RIGHT MEDIAL SUPERIOR CHEST
LOCATION DETAILED: LEFT ANTERIOR DISTAL UPPER ARM
LOCATION DETAILED: UPPER STERNUM
LOCATION DETAILED: RIGHT ANTERIOR DISTAL UPPER ARM
LOCATION DETAILED: LEFT VENTRAL PROXIMAL FOREARM
LOCATION DETAILED: RIGHT CENTRAL LATERAL NECK
LOCATION DETAILED: MIDDLE STERNUM
LOCATION DETAILED: LEFT CENTRAL LATERAL NECK

## 2021-09-09 ASSESSMENT — LOCATION SIMPLE DESCRIPTION DERM
LOCATION SIMPLE: LEFT FOREARM
LOCATION SIMPLE: LEFT UPPER ARM
LOCATION SIMPLE: RIGHT UPPER ARM
LOCATION SIMPLE: CHEST
LOCATION SIMPLE: NECK

## 2021-09-09 ASSESSMENT — LOCATION ZONE DERM
LOCATION ZONE: ARM
LOCATION ZONE: NECK
LOCATION ZONE: TRUNK

## 2021-10-07 NOTE — TELEPHONE ENCOUNTER
Patient notified. Results relayed, lab ordered, patient also has an appointment today. Resent all paperwork to Araceli Anthony [phone].

## 2021-10-20 ENCOUNTER — OFFICE VISIT (OUTPATIENT)
Dept: INTERNAL MEDICINE CLINIC | Facility: CLINIC | Age: 51
End: 2021-10-20

## 2021-10-20 VITALS
HEART RATE: 81 BPM | TEMPERATURE: 99 F | BODY MASS INDEX: 27.83 KG/M2 | WEIGHT: 210 LBS | SYSTOLIC BLOOD PRESSURE: 118 MMHG | DIASTOLIC BLOOD PRESSURE: 78 MMHG | HEIGHT: 73 IN

## 2021-10-20 DIAGNOSIS — J30.9 ALLERGIC RHINITIS, UNSPECIFIED SEASONALITY, UNSPECIFIED TRIGGER: ICD-10-CM

## 2021-10-20 DIAGNOSIS — I10 ESSENTIAL HYPERTENSION: ICD-10-CM

## 2021-10-20 DIAGNOSIS — K21.9 GASTROESOPHAGEAL REFLUX DISEASE WITHOUT ESOPHAGITIS: ICD-10-CM

## 2021-10-20 DIAGNOSIS — Z00.00 ANNUAL PHYSICAL EXAM: Primary | ICD-10-CM

## 2021-10-20 DIAGNOSIS — G47.33 OSA (OBSTRUCTIVE SLEEP APNEA): ICD-10-CM

## 2021-10-20 DIAGNOSIS — E78.6 LOW HDL (UNDER 40): ICD-10-CM

## 2021-10-20 PROBLEM — J02.9 SORE THROAT: Status: RESOLVED | Noted: 2019-03-13 | Resolved: 2021-10-20

## 2021-10-20 PROBLEM — R53.83 FATIGUE: Status: RESOLVED | Noted: 2017-07-20 | Resolved: 2021-10-20

## 2021-10-20 PROBLEM — R10.31 RIGHT LOWER QUADRANT ABDOMINAL PAIN: Status: RESOLVED | Noted: 2021-04-21 | Resolved: 2021-10-20

## 2021-10-20 PROBLEM — R55 VASOVAGAL SYNCOPE: Status: RESOLVED | Noted: 2021-03-19 | Resolved: 2021-10-20

## 2021-10-20 PROBLEM — H93.8X3 CONGESTION OF BOTH EARS: Status: RESOLVED | Noted: 2020-12-18 | Resolved: 2021-10-20

## 2021-10-20 PROCEDURE — 99396 PREV VISIT EST AGE 40-64: CPT | Performed by: INTERNAL MEDICINE

## 2021-10-20 PROCEDURE — 3074F SYST BP LT 130 MM HG: CPT | Performed by: INTERNAL MEDICINE

## 2021-10-20 PROCEDURE — 3008F BODY MASS INDEX DOCD: CPT | Performed by: INTERNAL MEDICINE

## 2021-10-20 PROCEDURE — 3078F DIAST BP <80 MM HG: CPT | Performed by: INTERNAL MEDICINE

## 2021-10-20 PROCEDURE — 90471 IMMUNIZATION ADMIN: CPT | Performed by: INTERNAL MEDICINE

## 2021-10-20 PROCEDURE — 90732 PPSV23 VACC 2 YRS+ SUBQ/IM: CPT | Performed by: INTERNAL MEDICINE

## 2021-10-20 NOTE — PROGRESS NOTES
Subjective:     Patient ID: Kj Smith is a 53year old male. HPI    History/Other:   Review of Systems   Constitutional: Negative. HENT: Positive for tinnitus. Negative for hearing loss. Eyes: Negative. Respiratory: Negative.     Sb Osullivan NG   • History of pneumonia     per NG   • History of snoring     per NG   • Hx of bee sting allergy 1978    Epi-pen; per NG   • Hx of hiatal hernia 2005    per NG   • Myopia of both eyes with astigmatism 2010    OU; per NG   • Prostatitis     per NG   • S well-developed. He is not ill-appearing, toxic-appearing or diaphoretic. HENT:      Head: Normocephalic and atraumatic.       Right Ear: Tympanic membrane, ear canal and external ear normal.      Left Ear: Tympanic membrane, ear canal and external ear nor Routine labs ordered. Pneumovax today; already had flu shot and covid vaccines.   He had colonoscopy done in 2015 and only int hemorrhoids, told pt to call his GI and ask when he is due for next one.     (I10) Essential hypertension  Plan: controlled with

## 2021-10-21 ENCOUNTER — LAB ENCOUNTER (OUTPATIENT)
Dept: LAB | Age: 51
End: 2021-10-21
Attending: INTERNAL MEDICINE
Payer: COMMERCIAL

## 2021-10-21 DIAGNOSIS — Z00.00 ANNUAL PHYSICAL EXAM: ICD-10-CM

## 2021-10-21 PROCEDURE — 82728 ASSAY OF FERRITIN: CPT

## 2021-10-21 PROCEDURE — 80061 LIPID PANEL: CPT

## 2021-10-21 PROCEDURE — 80053 COMPREHEN METABOLIC PANEL: CPT

## 2021-10-21 PROCEDURE — 85025 COMPLETE CBC W/AUTO DIFF WBC: CPT

## 2021-10-21 PROCEDURE — 84443 ASSAY THYROID STIM HORMONE: CPT

## 2021-10-21 PROCEDURE — 36415 COLL VENOUS BLD VENIPUNCTURE: CPT

## 2021-11-12 ENCOUNTER — OFFICE VISIT (OUTPATIENT)
Dept: NEUROLOGY | Facility: CLINIC | Age: 51
End: 2021-11-12
Payer: COMMERCIAL

## 2021-11-12 VITALS
HEIGHT: 73 IN | DIASTOLIC BLOOD PRESSURE: 78 MMHG | WEIGHT: 200 LBS | BODY MASS INDEX: 26.51 KG/M2 | HEART RATE: 98 BPM | SYSTOLIC BLOOD PRESSURE: 112 MMHG

## 2021-11-12 DIAGNOSIS — R42 PERSISTENT POSTURAL-PERCEPTUAL DIZZINESS: Primary | ICD-10-CM

## 2021-11-12 PROBLEM — H81.8X9 PERSISTENT POSTURAL-PERCEPTUAL DIZZINESS: Status: ACTIVE | Noted: 2021-11-12

## 2021-11-12 PROCEDURE — 3074F SYST BP LT 130 MM HG: CPT | Performed by: OTHER

## 2021-11-12 PROCEDURE — 3008F BODY MASS INDEX DOCD: CPT | Performed by: OTHER

## 2021-11-12 PROCEDURE — 3078F DIAST BP <80 MM HG: CPT | Performed by: OTHER

## 2021-11-12 PROCEDURE — 99205 OFFICE O/P NEW HI 60 MIN: CPT | Performed by: OTHER

## 2021-11-12 NOTE — PROGRESS NOTES
Derrick [facility] [address]  [place] [address] [place] [address]  [phone]          [facility]  NEW PATIENT EVALUATION  Reason for Admission/Consultation:  Abnormal sensation of movement    Requested by: Christiano Bishop records  1. Reviewed prior notes    ROS:  Pertinent positive and negatives per HPI. All others were reviewed and negative.     Past Medical History:   Diagnosis Date   • Allergic rhinitis    • Anxiety    • Blepharitis of both eyes 2010    OU; per NG   • GE Surgical History:   Procedure Laterality Date   • COLONOSCOPY  10/19/2015    internal hemorrhoids only   • EGD     • ELECTROCARDIOGRAM, COMPLETE  01-    SCANNED TO MEDIA TAB: 01-   • EXCISION TURBINATE,SUBMUCOUS  04/12/2018   • EXTRACTION ERU Maternal Grandmother    • Other (Other) Maternal Grandfather    • Other (Other) Paternal Grandmother    • No Known Problems Brother        Objective:  Vitals  /78   Pulse 98   Ht 73\"   Wt 200 lb (90.7 kg)   BMI 26.39 kg/m²   Body mass index is 26.39 Rapid/fine movements are symmetric. As expected their dominant hand is slightly faster. Foot Taps: Foot taps are symmetric. Asterixis: No asterixis noted.    Tremor: None      Reflexes:    C5 C6 C7  L4 S1   R 2+ 2+  2+ 2+   L 2+ 2+  2+ 2+   Adductor vertiginous symptoms. In the interim he was started on an SSRI for his anxiety, and reports that his vertigo did improve. He also notes a persistent sense of motion after his been driving on the highway for some time.   He has features that support both p notes, reviewing any relevant and available imaging, and in direct face to face time with the patient, of which greater than 50% of the time was spent in patient education, counseling, and coordination of care as described above.    Issues discussed: Tatum Hinojosa

## 2021-11-12 NOTE — PATIENT INSTRUCTIONS
This may be MAL DE DÉBARQUEMENT SYNDROME or  PERSISTENT POSTURAL PERCEPTUAL DIZZINESS. Go to vestibular therapy.   We will get an answer

## 2021-11-15 ENCOUNTER — TELEPHONE (OUTPATIENT)
Dept: PHYSICAL THERAPY | Facility: HOSPITAL | Age: 51
End: 2021-11-15

## 2021-11-16 ENCOUNTER — TELEPHONE (OUTPATIENT)
Dept: PHYSICAL THERAPY | Facility: HOSPITAL | Age: 51
End: 2021-11-16

## 2021-11-17 ENCOUNTER — OFFICE VISIT (OUTPATIENT)
Dept: PHYSICAL THERAPY | Facility: HOSPITAL | Age: 51
End: 2021-11-17
Attending: Other
Payer: COMMERCIAL

## 2021-11-17 DIAGNOSIS — R42 PERSISTENT POSTURAL-PERCEPTUAL DIZZINESS: ICD-10-CM

## 2021-11-17 PROCEDURE — 97112 NEUROMUSCULAR REEDUCATION: CPT

## 2021-11-17 PROCEDURE — 97162 PT EVAL MOD COMPLEX 30 MIN: CPT

## 2021-11-17 NOTE — PROGRESS NOTES
VESTIBULAR EVALUATION:   Referring Physician: Dr. James Lombardi  Diagnosis: Persistent Postural Perceptual Dizziness (PPPD)  Date of Service: 11/17/2021   [facility]      PATIENT SUMMARY   Magali Thomas is a 53year old male who presents to therapy Syndrome is a possibility. Pt. Demonstrated impairment in postural control, body awareness and gait stability. His symptoms were mild today, but consistent with PPPD.   An oculomotor/vestibular exam will be performed next session to rule out any underlyin treatment plan, expectations, and prognosis. Pt was also provided recommendations for activity modifications, detrimental fear avoidance behaviors, importance of remaining active and possible dizziness after evaluation.    Patient was instructed in and issu this plan of treatment and while under my care.     X___________________________________________________ Date____________________    Certification From: 42/34/9132  To:2/15/2022

## 2021-11-19 ENCOUNTER — OFFICE VISIT (OUTPATIENT)
Dept: PHYSICAL THERAPY | Facility: HOSPITAL | Age: 51
End: 2021-11-19
Attending: Other
Payer: COMMERCIAL

## 2021-11-19 PROCEDURE — 97112 NEUROMUSCULAR REEDUCATION: CPT

## 2021-11-19 NOTE — PROGRESS NOTES
Date  11/19/21           Visit Number 2/10           [facility] x           Ther EX            Ther Act            Gait Training            CRM             Manual              Dx:  PPPD         Insurance:  [employer]cie Ill O    Visit # authorized:  10 per POC  Referring

## 2021-11-22 ENCOUNTER — APPOINTMENT (OUTPATIENT)
Dept: PHYSICAL THERAPY | Facility: HOSPITAL | Age: 51
End: 2021-11-22
Attending: Other
Payer: COMMERCIAL

## 2021-11-24 ENCOUNTER — APPOINTMENT (OUTPATIENT)
Dept: PHYSICAL THERAPY | Facility: HOSPITAL | Age: 51
End: 2021-11-24
Attending: Other
Payer: COMMERCIAL

## 2021-11-30 ENCOUNTER — OFFICE VISIT (OUTPATIENT)
Dept: PHYSICAL THERAPY | Facility: HOSPITAL | Age: 51
End: 2021-11-30
Attending: Other
Payer: COMMERCIAL

## 2021-11-30 PROCEDURE — 97112 NEUROMUSCULAR REEDUCATION: CPT

## 2021-11-30 NOTE — PROGRESS NOTES
Date  11/19/21 11/30/21          Visit Number 2/10 3/10          [facility] x x          Ther EX            Ther Act            Gait Training            CRM             Manual              Dx:  PPPD         Insurance:  [employer] [facility]    Visit # authorized:  10 per PO

## 2021-12-16 ENCOUNTER — OFFICE VISIT (OUTPATIENT)
Dept: PHYSICAL THERAPY | Facility: HOSPITAL | Age: 51
End: 2021-12-16
Attending: Other
Payer: COMMERCIAL

## 2021-12-16 PROCEDURE — 97112 NEUROMUSCULAR REEDUCATION: CPT

## 2021-12-16 NOTE — PROGRESS NOTES
Patient Name: Amira Gaona, : 1970, MRN: [id number]   Date:  2021  Referring Physician:  Kalen Song    Diagnosis: Persistent postural-perceptual dizziness (PPPD)    Discharge Summary    Pt has attended 4 visits in physical therapy.     Prog least 29/30, to reflect an improvement in gait stability. (GOAL MET)  3. Pt. indep in home exercise program to maintain functional gains.   (GOAL MET)         Charges: NMR x 3       Total Timed Treatment: 40 min  Total Treatment Time: 40 min    Plan: Disc

## 2021-12-28 ENCOUNTER — TELEPHONE (OUTPATIENT)
Dept: NEUROLOGY | Facility: CLINIC | Age: 51
End: 2021-12-28

## 2021-12-28 DIAGNOSIS — R42 PERSISTENT POSTURAL-PERCEPTUAL DIZZINESS: Primary | ICD-10-CM

## 2021-12-28 NOTE — TELEPHONE ENCOUNTER
Submitted request to [facility] for Additional PT CPT 98961     Status: pending [facility] clinical review

## 2022-01-05 ENCOUNTER — OFFICE VISIT (OUTPATIENT)
Dept: NEUROLOGY | Facility: CLINIC | Age: 52
End: 2022-01-05
Payer: COMMERCIAL

## 2022-01-05 VITALS
DIASTOLIC BLOOD PRESSURE: 68 MMHG | HEIGHT: 72 IN | SYSTOLIC BLOOD PRESSURE: 108 MMHG | BODY MASS INDEX: 27.09 KG/M2 | HEART RATE: 70 BPM | WEIGHT: 200 LBS

## 2022-01-05 DIAGNOSIS — R42 PERSISTENT POSTURAL-PERCEPTUAL DIZZINESS: Primary | ICD-10-CM

## 2022-01-05 PROCEDURE — 3008F BODY MASS INDEX DOCD: CPT | Performed by: OTHER

## 2022-01-05 PROCEDURE — 3074F SYST BP LT 130 MM HG: CPT | Performed by: OTHER

## 2022-01-05 PROCEDURE — 99213 OFFICE O/P EST LOW 20 MIN: CPT | Performed by: OTHER

## 2022-01-05 PROCEDURE — 3078F DIAST BP <80 MM HG: CPT | Performed by: OTHER

## 2022-01-05 NOTE — PROGRESS NOTES
Der[facility]  [address] [facility], [address] [place] [address]  [phone]        Neurology Clinic Follow Up Note    Chief Complaint:  Dizziness (LOV: 11/12/21.  Pt states he did go to vestibular therapy and has improved some and give mechanism. Reports a sensation of \"bobbing\" in the coronal plane when he is \"checking emails and getting bombarded. \"    Reports high pitched dog whistle tinnitus; actual improvs when it is quiet. ROS: Pertinent positive and negatives per HPI. nasolabial fold. Charlott Lyndon Hearing grossly intact. Tongue midline. No atrophy or fasiculations of the tongue noted. Palate and uvula elevate symmetrically. Shoulder shrug symmetric.    - Motor:  normal tone/bulk. No interosseous wasting.  No flattening of hypoth with more vestibular therapy.   .  From [employer] \"Serotonergic antidepressants (selective serotonin reuptake inhibitors [SSRIs]) or those that also include action on the norepinephrine system (SSRIs/serotonin norepinephrine reuptake inhibitor [SNRIs]) can treatment options, risks and benefits of treatment, and management of care regarding the diagnosis above. The total face-to-face physician visit time was 20 minutes minutes.   Over 50% of the physician time of the visit was spent in counseling/coordination

## 2022-01-13 NOTE — TELEPHONE ENCOUNTER
Received notice of Approval from [address] at [facility] for PT valid until 4/1/22 to be done at [address] [place] [address]    Patient notified via [employer]

## 2022-01-24 ENCOUNTER — OFFICE VISIT (OUTPATIENT)
Dept: INTERNAL MEDICINE CLINIC | Facility: CLINIC | Age: 52
End: 2022-01-24
Payer: COMMERCIAL

## 2022-01-24 VITALS
DIASTOLIC BLOOD PRESSURE: 84 MMHG | HEART RATE: 80 BPM | SYSTOLIC BLOOD PRESSURE: 126 MMHG | WEIGHT: 220 LBS | HEIGHT: 74 IN | RESPIRATION RATE: 12 BRPM | BODY MASS INDEX: 28.23 KG/M2

## 2022-01-24 DIAGNOSIS — H93.13 BILATERAL TINNITUS: Primary | ICD-10-CM

## 2022-01-24 DIAGNOSIS — Z82.79 FAMILY HISTORY OF BICUSPID AORTIC VALVE: ICD-10-CM

## 2022-01-24 PROCEDURE — 3074F SYST BP LT 130 MM HG: CPT | Performed by: INTERNAL MEDICINE

## 2022-01-24 PROCEDURE — 3008F BODY MASS INDEX DOCD: CPT | Performed by: INTERNAL MEDICINE

## 2022-01-24 PROCEDURE — 99214 OFFICE O/P EST MOD 30 MIN: CPT | Performed by: INTERNAL MEDICINE

## 2022-01-24 PROCEDURE — 3079F DIAST BP 80-89 MM HG: CPT | Performed by: INTERNAL MEDICINE

## 2022-01-24 NOTE — PROGRESS NOTES
Subjective:     Patient ID: Andrés Perera is a 49year old male. Patient also had asked to have a screening test for a bicuspid aortic valve. His sister was recently diagnosed to have a bicuspid aortic valve.   The patient does not have any history of pr Comment:Other reaction(s):  Anaphylaxis,throat  closed  Seasonal                Runny nose    Comment:Sneezing, sinus congestion    Past Medical History:   Diagnosis Date   • Allergic rhinitis    • Anxiety    • Blepharitis of both eyes 2010    OU; per CAROLYN Never Smoker      Smokeless tobacco: Never Used      Tobacco comment: per NG    Vaping Use      Vaping Use: Never used    Alcohol use:  Yes      Alcohol/week: 0.0 standard drinks      Comment: socially - 2 beers/week    Drug use: No       Objective:   Physi today. No orders of the defined types were placed in this encounter.       Meds This Visit:  Requested Prescriptions      No prescriptions requested or ordered in this encounter       Imaging & Referrals:  None

## 2022-02-10 ENCOUNTER — HOSPITAL ENCOUNTER (OUTPATIENT)
Dept: CV DIAGNOSTICS | Facility: HOSPITAL | Age: 52
Discharge: HOME | End: 2022-02-10
Attending: INTERNAL MEDICINE
Payer: COMMERCIAL

## 2022-02-10 DIAGNOSIS — Z82.79 FAMILY HISTORY OF BICUSPID AORTIC VALVE: ICD-10-CM

## 2022-02-10 PROCEDURE — 93306 TTE W/DOPPLER COMPLETE: CPT | Performed by: INTERNAL MEDICINE

## 2022-03-08 NOTE — TELEPHONE ENCOUNTER
LR 6-15-21 by Julia Aguilar MD    Refill passed per [facility] protocol   Requested Prescriptions   Pending Prescriptions Disp Refills    FLUOXETINE 10 MG Oral Cap [Pharmacy Med Name: FLUOXETINE HCL 10 MG CAPSULE] 90 capsule 1     Sig: TAKE 1 CAPSULE BY MOUTH EVERY DAY        Psychiatric Non-Scheduled (Anti-Anxiety) Passed - 3/8/2022  5:46 PM        Passed - Appointment in last 6 or next 3 months

## 2022-03-09 RX ORDER — FLUOXETINE 10 MG/1
10 CAPSULE ORAL DAILY
Qty: 90 CAPSULE | Refills: 1 | Status: SHIPPED | OUTPATIENT
Start: 2022-03-09

## 2022-03-22 RX ORDER — LOSARTAN POTASSIUM 50 MG/1
50 TABLET ORAL DAILY
Qty: 90 TABLET | Refills: 1 | Status: SHIPPED | OUTPATIENT
Start: 2022-03-22 | End: 2022-09-18

## 2022-03-22 RX ORDER — LOSARTAN POTASSIUM 50 MG/1
TABLET ORAL
Qty: 90 TABLET | Refills: 3 | OUTPATIENT
Start: 2022-03-22

## 2022-03-22 NOTE — TELEPHONE ENCOUNTER
Refill passed per [facility] protocol. Former DR. Magdaleno Cobian patient, do you want to refill ? Requested Prescriptions   Pending Prescriptions Disp Refills    losartan 50 MG Oral Tab 90 tablet 3     Sig: Take 1 tablet (50 mg total) by mouth daily. Hypertensive Medications Protocol Passed - 3/21/2022 12:55 PM        Passed - CMP or BMP in past 12 months        Passed - Appointment in past 6 or next 3 months        Passed - GFR Non- > 50     Lab Results   Component Value Date    GFRNAA 78 10/21/2021                     Recent Outpatient Visits              1 month ago Bilateral tinnitus    [facility], [address], Jumana Wilburn MD    Office Visit    2 months ago Persistent postural-perceptual dizziness    [facility] [place], [place]    Office Visit    3 months ago     [facility], PT    Office Visit    3 months ago     [facility], PT    Office Visit    4 months ago     [facility], [place]    Office Visit          Future Appointments         Provider Department Appt Notes    In 2 weeks Hilario Lobato MD [facility], [address], [place] Intermittent ringing in ears. Also, when coughing (not sick and or non covid related), it's always a dry cough. I also sometimes feel a little dizzy when coughing. In 2 weeks [facility] Intermittent ringing in ears. Also, when coughing (not sick and or non covid related), it's always a dry cough. I also sometimes feel a little dizzy when coughing.

## 2022-03-25 ENCOUNTER — HOSPITAL ENCOUNTER (OUTPATIENT)
Age: 52
Discharge: HOME | End: 2022-03-25
Payer: COMMERCIAL

## 2022-03-25 ENCOUNTER — MOBILE ENCOUNTER (OUTPATIENT)
Dept: INTERNAL MEDICINE CLINIC | Facility: CLINIC | Age: 52
End: 2022-03-25

## 2022-03-25 VITALS
DIASTOLIC BLOOD PRESSURE: 93 MMHG | HEART RATE: 98 BPM | RESPIRATION RATE: 18 BRPM | OXYGEN SATURATION: 99 % | TEMPERATURE: 98 F | SYSTOLIC BLOOD PRESSURE: 167 MMHG

## 2022-03-25 DIAGNOSIS — J01.00 ACUTE NON-RECURRENT MAXILLARY SINUSITIS: ICD-10-CM

## 2022-03-25 DIAGNOSIS — H65.91 FLUID LEVEL BEHIND TYMPANIC MEMBRANE OF RIGHT EAR: ICD-10-CM

## 2022-03-25 DIAGNOSIS — Z20.822 ENCOUNTER FOR LABORATORY TESTING FOR COVID-19 VIRUS: Primary | ICD-10-CM

## 2022-03-25 LAB — SARS-COV-2 RNA RESP QL NAA+PROBE: NOT DETECTED

## 2022-03-25 PROCEDURE — U0002 COVID-19 LAB TEST NON-CDC: HCPCS | Performed by: NURSE PRACTITIONER

## 2022-03-25 PROCEDURE — 99213 OFFICE O/P EST LOW 20 MIN: CPT | Performed by: NURSE PRACTITIONER

## 2022-03-25 RX ORDER — FLUTICASONE PROPIONATE 50 MCG
2 SPRAY, SUSPENSION (ML) NASAL DAILY
Qty: 16 G | Refills: 0 | Status: SHIPPED | OUTPATIENT
Start: 2022-03-25 | End: 2022-04-24

## 2022-03-25 RX ORDER — BENZONATATE 100 MG/1
100 CAPSULE ORAL 3 TIMES DAILY PRN
Qty: 30 CAPSULE | Refills: 1 | Status: SHIPPED | OUTPATIENT
Start: 2022-03-25

## 2022-03-25 RX ORDER — AZITHROMYCIN 250 MG/1
TABLET, FILM COATED ORAL
Qty: 6 TABLET | Refills: 0 | Status: SHIPPED | OUTPATIENT
Start: 2022-03-25 | End: 2022-03-30

## 2022-04-20 ENCOUNTER — OFFICE VISIT (OUTPATIENT)
Dept: INTERNAL MEDICINE CLINIC | Facility: CLINIC | Age: 52
End: 2022-04-20
Payer: COMMERCIAL

## 2022-04-20 ENCOUNTER — HOSPITAL ENCOUNTER (OUTPATIENT)
Dept: GENERAL RADIOLOGY | Age: 52
Discharge: HOME | End: 2022-04-20
Attending: INTERNAL MEDICINE
Payer: COMMERCIAL

## 2022-04-20 VITALS
OXYGEN SATURATION: 98 % | BODY MASS INDEX: 28.62 KG/M2 | HEIGHT: 74 IN | RESPIRATION RATE: 16 BRPM | SYSTOLIC BLOOD PRESSURE: 124 MMHG | WEIGHT: 223 LBS | DIASTOLIC BLOOD PRESSURE: 74 MMHG | HEART RATE: 94 BPM

## 2022-04-20 DIAGNOSIS — R05.9 COUGH: Primary | ICD-10-CM

## 2022-04-20 DIAGNOSIS — R05.9 COUGH: ICD-10-CM

## 2022-04-20 PROCEDURE — 3008F BODY MASS INDEX DOCD: CPT | Performed by: INTERNAL MEDICINE

## 2022-04-20 PROCEDURE — 99214 OFFICE O/P EST MOD 30 MIN: CPT | Performed by: INTERNAL MEDICINE

## 2022-04-20 PROCEDURE — 3078F DIAST BP <80 MM HG: CPT | Performed by: INTERNAL MEDICINE

## 2022-04-20 PROCEDURE — 3074F SYST BP LT 130 MM HG: CPT | Performed by: INTERNAL MEDICINE

## 2022-04-20 PROCEDURE — 71046 X-RAY EXAM CHEST 2 VIEWS: CPT | Performed by: INTERNAL MEDICINE

## 2022-05-03 ENCOUNTER — PATIENT MESSAGE (OUTPATIENT)
Dept: INTERNAL MEDICINE CLINIC | Facility: CLINIC | Age: 52
End: 2022-05-03

## 2022-05-04 NOTE — TELEPHONE ENCOUNTER
From: Marcelina Ordoñez  To: Yas Conley MD  Sent: 5/3/2022 7:53 PM CDT  Subject: Throat/Voice    Hello. I saw you a couple weeks ago for a cough I had for weeks. The cough is better, but still there and in addition, I am noticing my voice is becoming weak, strained and kind of hoarse. Is this normal after being on the steroid inhaler or could something else be going on since I am still struggling with the cough?      Thank you  [employer] [employer]

## 2022-05-04 NOTE — TELEPHONE ENCOUNTER
Cough can last for several weeks; can be straining his vocal cords though, would recommend seeing our ENT Dr Ryanne Rothman.

## 2022-05-12 ENCOUNTER — OFFICE VISIT (OUTPATIENT)
Dept: OTOLARYNGOLOGY | Facility: CLINIC | Age: 52
End: 2022-05-12
Payer: COMMERCIAL

## 2022-05-12 DIAGNOSIS — J34.89 NASAL CRUSTING: ICD-10-CM

## 2022-05-12 DIAGNOSIS — H93.13 BILATERAL TINNITUS: Primary | ICD-10-CM

## 2022-05-12 PROCEDURE — 31231 NASAL ENDOSCOPY DX: CPT | Performed by: SPECIALIST

## 2022-05-12 PROCEDURE — 99213 OFFICE O/P EST LOW 20 MIN: CPT | Performed by: SPECIALIST

## 2022-05-12 NOTE — PATIENT INSTRUCTIONS
An audiogram was ordered to better evaluate your tinnitus. You can reduce your sodium and caffeine and also try Lipo flavonoid. No nasal abnormalities noted on your fiberoptic scope. Please use nasal irrigation. Can try NeilMed irrigation.

## 2022-07-07 ENCOUNTER — OFFICE VISIT (OUTPATIENT)
Dept: INTERNAL MEDICINE CLINIC | Facility: CLINIC | Age: 52
End: 2022-07-07
Payer: COMMERCIAL

## 2022-07-07 VITALS
OXYGEN SATURATION: 97 % | BODY MASS INDEX: 28.28 KG/M2 | HEART RATE: 96 BPM | WEIGHT: 220.38 LBS | DIASTOLIC BLOOD PRESSURE: 78 MMHG | SYSTOLIC BLOOD PRESSURE: 120 MMHG | TEMPERATURE: 98 F | HEIGHT: 74 IN

## 2022-07-07 DIAGNOSIS — R29.898 KNEE CLICKING: ICD-10-CM

## 2022-07-07 DIAGNOSIS — M54.50 ACUTE BILATERAL LOW BACK PAIN, UNSPECIFIED WHETHER SCIATICA PRESENT: Primary | ICD-10-CM

## 2022-07-07 DIAGNOSIS — R10.32 LEFT INGUINAL PAIN: ICD-10-CM

## 2022-07-07 PROCEDURE — 3008F BODY MASS INDEX DOCD: CPT | Performed by: INTERNAL MEDICINE

## 2022-07-07 PROCEDURE — 3074F SYST BP LT 130 MM HG: CPT | Performed by: INTERNAL MEDICINE

## 2022-07-07 PROCEDURE — 99214 OFFICE O/P EST MOD 30 MIN: CPT | Performed by: INTERNAL MEDICINE

## 2022-07-07 PROCEDURE — 3078F DIAST BP <80 MM HG: CPT | Performed by: INTERNAL MEDICINE

## 2022-07-11 ENCOUNTER — HOSPITAL ENCOUNTER (OUTPATIENT)
Dept: GENERAL RADIOLOGY | Age: 52
Discharge: HOME | End: 2022-07-11
Attending: INTERNAL MEDICINE
Payer: COMMERCIAL

## 2022-07-11 DIAGNOSIS — M54.50 ACUTE BILATERAL LOW BACK PAIN, UNSPECIFIED WHETHER SCIATICA PRESENT: ICD-10-CM

## 2022-07-11 PROCEDURE — 72110 X-RAY EXAM L-2 SPINE 4/>VWS: CPT | Performed by: INTERNAL MEDICINE

## 2022-07-12 ENCOUNTER — TELEPHONE (OUTPATIENT)
Dept: INTERNAL MEDICINE CLINIC | Facility: CLINIC | Age: 52
End: 2022-07-12

## 2022-07-12 DIAGNOSIS — M54.50 ACUTE BILATERAL LOW BACK PAIN, UNSPECIFIED WHETHER SCIATICA PRESENT: Primary | ICD-10-CM

## 2022-08-16 ENCOUNTER — PATIENT MESSAGE (OUTPATIENT)
Dept: INTERNAL MEDICINE CLINIC | Facility: CLINIC | Age: 52
End: 2022-08-16

## 2022-08-29 RX ORDER — FLUOXETINE 10 MG/1
10 CAPSULE ORAL DAILY
Qty: 90 CAPSULE | Refills: 1 | Status: SHIPPED | OUTPATIENT
Start: 2022-08-29 | End: 2023-02-26

## 2022-08-29 NOTE — TELEPHONE ENCOUNTER
Refill passed per [facility] protocol.      Requested Prescriptions   Pending Prescriptions Disp Refills    FLUOXETINE 10 MG Oral Cap [Pharmacy Med Name: FLUOXETINE HCL 10 MG CAPSULE] 90 capsule 1     Sig: TAKE 1 CAPSULE BY MOUTH EVERY DAY        Psychiatric Non-Scheduled (Anti-Anxiety) Passed - 8/29/2022  9:40 AM        Passed - In person appointment or virtual visit in the past 6 mos or appointment in next 3 mos       Recent Outpatient Visits              1 month ago Acute bilateral low back pain, unspecified whether sciatica present    [facility], Charan Theodore [facility]    Office Visit    3 months ago Bilateral tinnitus    [facility], [address], Aaron Rubio MD    Office Visit    4 months ago Cough    [facility], [address], Charan Gross MD    Office Visit    7 months ago Bilateral tinnitus    [facility], [address], Charan Gross MD    Office Visit    7 months ago Persistent postural-perceptual dizziness    [facility] L[facility], [place], [place]    Office Visit                       @[facility]      @[facility]

## 2022-08-30 ENCOUNTER — HOSPITAL ENCOUNTER (OUTPATIENT)
Age: 52
Discharge: HOME | End: 2022-08-30
Payer: COMMERCIAL

## 2022-08-30 VITALS
TEMPERATURE: 97 F | WEIGHT: 220 LBS | SYSTOLIC BLOOD PRESSURE: 140 MMHG | RESPIRATION RATE: 20 BRPM | DIASTOLIC BLOOD PRESSURE: 95 MMHG | OXYGEN SATURATION: 99 % | HEART RATE: 73 BPM | BODY MASS INDEX: 29.16 KG/M2 | HEIGHT: 73 IN

## 2022-08-30 DIAGNOSIS — J45.909 ACUTE ASTHMA: Primary | ICD-10-CM

## 2022-08-30 DIAGNOSIS — R03.0 ELEVATED BLOOD PRESSURE READING: ICD-10-CM

## 2022-08-30 DIAGNOSIS — R05.1 ACUTE COUGH: ICD-10-CM

## 2022-08-30 DIAGNOSIS — Z20.822 ENCOUNTER FOR LABORATORY TESTING FOR COVID-19 VIRUS: ICD-10-CM

## 2022-08-30 LAB — SARS-COV-2 RNA RESP QL NAA+PROBE: NOT DETECTED

## 2022-08-30 PROCEDURE — 99213 OFFICE O/P EST LOW 20 MIN: CPT | Performed by: PHYSICIAN ASSISTANT

## 2022-08-30 PROCEDURE — U0002 COVID-19 LAB TEST NON-CDC: HCPCS | Performed by: PHYSICIAN ASSISTANT

## 2022-08-30 RX ORDER — ALBUTEROL SULFATE 90 UG/1
2 AEROSOL, METERED RESPIRATORY (INHALATION) EVERY 4 HOURS PRN
Qty: 1 EACH | Refills: 0 | Status: SHIPPED | OUTPATIENT
Start: 2022-08-30 | End: 2022-09-29

## 2022-08-30 RX ORDER — CODEINE PHOSPHATE AND GUAIFENESIN 10; 100 MG/5ML; MG/5ML
5 SOLUTION ORAL EVERY 6 HOURS PRN
Qty: 50 ML | Refills: 0 | Status: SHIPPED | OUTPATIENT
Start: 2022-08-30

## 2022-08-30 RX ORDER — BENZONATATE 100 MG/1
100 CAPSULE ORAL 3 TIMES DAILY PRN
Qty: 30 CAPSULE | Refills: 0 | Status: SHIPPED | OUTPATIENT
Start: 2022-08-30 | End: 2022-09-29

## 2022-08-30 RX ORDER — PREDNISONE 20 MG/1
40 TABLET ORAL DAILY
Qty: 10 TABLET | Refills: 0 | Status: SHIPPED | OUTPATIENT
Start: 2022-08-30 | End: 2022-09-04

## 2022-09-20 NOTE — TELEPHONE ENCOUNTER
Please review. Protocol failed or has no protocol. Requested Prescriptions   Pending Prescriptions Disp Refills    LOSARTAN 50 MG Oral Tab [Pharmacy Med Name: LOSARTAN POTASSIUM 50 MG TAB] 90 tablet 1     Sig: TAKE 1 TABLET BY MOUTH EVERY DAY        Hypertensive Medications Protocol Failed - 9/17/2022 12:23 AM        Failed - Last BP reading less than 140/90     BP Readings from Last 1 Encounters:  08/30/22 : (!) 140/95                Failed - CMP or BMP in past 6 months     No results found for this or any previous visit (from the past 4392 hour(s)).               Passed - In person appointment in the past 12 or next 3 months       Recent Outpatient Visits              2 months ago Acute bilateral low back pain, unspecified whether sciatica present    [address], Adal Boles MD    Office Visit    4 months ago Bilateral tinnitus    [address], [address], [address] [employer], Mindy Amaro MD    Office Visit    5 months ago Cough    [address], [address], Jose Guadalupe Hayden MD    Office Visit    7 months ago Bilateral tinnitus    [address], [address], Jose Guadalupe Hayden MD    Office Visit    8 months ago Persistent postural-perceptual dizziness    [facility] [place], [place]    Office Visit     Future Appointments         Provider Department Appt Notes    In 1 month Jero Boles MD [address], [address], [address] [facility] Annual physical               Passed - In person appointment or virtual visit in the past 6 months       Recent Outpatient Visits              2 months ago Acute bilateral low back pain, unspecified whether sciatica present    [address], Jose Guadalupe Hayden MD    Office Visit    4 months ago Bilateral tinnitus    [address], [address], [address] [employer], Mindy Amaro MD    Office Visit    5 months ago [address], Jose Guadalupe Hayden MD Office Visit    7 months ago Bilateral tinnitus    [address] MD    Office Visit    8 months ago Persistent postural-perceptual dizziness    [facility] [place], [place]    Office Visit     Future Appointments         Provider Department Appt Notes    In 1 month Claire Shah MD [facility], [address] Annual physical               Passed - [facility] or GFRNAA > 50     GFR Evaluation  GFRNAA: 78 , resulted on 10/21/2021                  Recent Outpatient Visits              2 months ago Acute bilateral low back pain, unspecified whether sciatica present    [facility], [address], Daniel Matthew MD    Office Visit    4 months ago Bilateral tinnitus    [facility], [address], April Nelson MD    Office Visit    5 months ago Cough    [facility], [address], Daniel Matthew MD    Office Visit    7 months ago Bilateral tinnitus    [facility], [address], Daniel Matthew MD    Office Visit    8 months ago Persistent postural-perceptual dizziness    [facility] Cheko Ruedad Visit            Future Appointments         Provider Department Appt Notes    In 1 month Claire Shah MD [employer], [address], [address] [facility] Annual physical

## 2022-09-21 RX ORDER — LOSARTAN POTASSIUM 50 MG/1
50 TABLET ORAL DAILY
Qty: 90 TABLET | Refills: 1 | Status: SHIPPED | OUTPATIENT
Start: 2022-09-21

## 2022-09-26 ENCOUNTER — NURSE TRIAGE (OUTPATIENT)
Dept: INTERNAL MEDICINE CLINIC | Facility: CLINIC | Age: 52
End: 2022-09-26

## 2022-09-26 NOTE — TELEPHONE ENCOUNTER
Spoke with patient wife Anya Pavon , ( Name and   verified ) states patient had no further dizziness since last nihgt     Future Appointments   Date Time Provider Miryam Simpson   10/21/2022  8:00 AM Mauricio Miller MD [facility]       Informed mask is required for building entry and appointment. Patient wife  verbalizes understanding and agrees.

## 2022-09-26 NOTE — TELEPHONE ENCOUNTER
Clinical Staff, please assist    Patient's wife, calling (name and , SANDRA verified) back to confirm the appt for tomorrow with PCP at 11:30am.     See message below from PCP and add to schedule for tomorrow, 22 at 11:30am.    Olga Lidia Evans MD   Physician   Specialty:  Internal Medicine   Telephone Encounter   Signed   Encounter Date:  2022                          If his recurs, go to ER right away for eval.   Have pt see me tomorrow at 11:30am as add in.

## 2022-09-27 ENCOUNTER — OFFICE VISIT (OUTPATIENT)
Dept: INTERNAL MEDICINE CLINIC | Facility: CLINIC | Age: 52
End: 2022-09-27

## 2022-09-27 VITALS
BODY MASS INDEX: 17 KG/M2 | SYSTOLIC BLOOD PRESSURE: 124 MMHG | HEART RATE: 76 BPM | TEMPERATURE: 97 F | OXYGEN SATURATION: 98 % | WEIGHT: 128.88 LBS | DIASTOLIC BLOOD PRESSURE: 76 MMHG

## 2022-09-27 DIAGNOSIS — R55 VASOVAGAL NEAR SYNCOPE: Primary | ICD-10-CM

## 2022-09-27 PROCEDURE — 99214 OFFICE O/P EST MOD 30 MIN: CPT | Performed by: INTERNAL MEDICINE

## 2022-09-27 PROCEDURE — 93000 ELECTROCARDIOGRAM COMPLETE: CPT | Performed by: INTERNAL MEDICINE

## 2022-09-27 PROCEDURE — 3078F DIAST BP <80 MM HG: CPT | Performed by: INTERNAL MEDICINE

## 2022-09-27 PROCEDURE — 3074F SYST BP LT 130 MM HG: CPT | Performed by: INTERNAL MEDICINE

## 2022-09-28 ENCOUNTER — LAB ENCOUNTER (OUTPATIENT)
Dept: LAB | Age: 52
End: 2022-09-28
Attending: INTERNAL MEDICINE
Payer: COMMERCIAL

## 2022-09-28 ENCOUNTER — PATIENT MESSAGE (OUTPATIENT)
Dept: INTERNAL MEDICINE CLINIC | Facility: CLINIC | Age: 52
End: 2022-09-28

## 2022-09-28 DIAGNOSIS — R55 VASOVAGAL NEAR SYNCOPE: ICD-10-CM

## 2022-09-28 LAB
ANION GAP SERPL CALC-SCNC: 4 MMOL/L (ref 0–18)
BASOPHILS # BLD AUTO: 0.02 X10(3) UL (ref 0–0.2)
BASOPHILS NFR BLD AUTO: 0.4 %
BUN BLD-MCNC: 15 MG/DL (ref 7–18)
BUN/CREAT SERPL: 11.6 (ref 10–20)
CALCIUM BLD-MCNC: 8.9 MG/DL (ref 8.5–10.1)
CHLORIDE SERPL-SCNC: 106 MMOL/L (ref 98–112)
CO2 SERPL-SCNC: 31 MMOL/L (ref 21–32)
CREAT BLD-MCNC: 1.29 MG/DL
DEPRECATED RDW RBC AUTO: 41.4 FL (ref 35.1–46.3)
EOSINOPHIL # BLD AUTO: 0.17 X10(3) UL (ref 0–0.7)
EOSINOPHIL NFR BLD AUTO: 3.1 %
ERYTHROCYTE [DISTWIDTH] IN BLOOD BY AUTOMATED COUNT: 12.5 % (ref 11–15)
FASTING STATUS PATIENT QL REPORTED: YES
GFR SERPLBLD BASED ON 1.73 SQ M-ARVRAT: 68 ML/MIN/1.73M2 (ref 60–?)
GLUCOSE BLD-MCNC: 99 MG/DL (ref 70–99)
HCT VFR BLD AUTO: 45.4 %
HGB BLD-MCNC: 14.6 G/DL
IMM GRANULOCYTES # BLD AUTO: 0.02 X10(3) UL (ref 0–1)
IMM GRANULOCYTES NFR BLD: 0.4 %
LYMPHOCYTES # BLD AUTO: 1.33 X10(3) UL (ref 1–4)
LYMPHOCYTES NFR BLD AUTO: 24.4 %
MCH RBC QN AUTO: 29.1 PG (ref 26–34)
MCHC RBC AUTO-ENTMCNC: 32.2 G/DL (ref 31–37)
MCV RBC AUTO: 90.4 FL
MONOCYTES # BLD AUTO: 0.4 X10(3) UL (ref 0.1–1)
MONOCYTES NFR BLD AUTO: 7.3 %
NEUTROPHILS # BLD AUTO: 3.52 X10 (3) UL (ref 1.5–7.7)
NEUTROPHILS # BLD AUTO: 3.52 X10(3) UL (ref 1.5–7.7)
NEUTROPHILS NFR BLD AUTO: 64.4 %
OSMOLALITY SERPL CALC.SUM OF ELEC: 293 MOSM/KG (ref 275–295)
PLATELET # BLD AUTO: 194 10(3)UL (ref 150–450)
POTASSIUM SERPL-SCNC: 4.5 MMOL/L (ref 3.5–5.1)
RBC # BLD AUTO: 5.02 X10(6)UL
SODIUM SERPL-SCNC: 141 MMOL/L (ref 136–145)
WBC # BLD AUTO: 5.5 X10(3) UL (ref 4–11)

## 2022-09-28 PROCEDURE — 36415 COLL VENOUS BLD VENIPUNCTURE: CPT

## 2022-09-28 PROCEDURE — 85025 COMPLETE CBC W/AUTO DIFF WBC: CPT

## 2022-09-28 PROCEDURE — 80048 BASIC METABOLIC PNL TOTAL CA: CPT

## 2022-09-28 NOTE — TELEPHONE ENCOUNTER
From: Carlos Hyde  To: Irais Pink MD  Sent: 9/28/2022 3:31 PM CDT  Subject: Glucose    Hi Dr Pratima Hawley. I saw your message saying my blood panel looks good. Should I be concerned that my Glucose was at 99?

## 2022-09-30 ENCOUNTER — TELEPHONE (OUTPATIENT)
Dept: INTERNAL MEDICINE CLINIC | Facility: CLINIC | Age: 52
End: 2022-09-30

## 2022-09-30 RX ORDER — OSELTAMIVIR PHOSPHATE 75 MG/1
75 CAPSULE ORAL DAILY
Qty: 10 CAPSULE | Refills: 0 | Status: SHIPPED | OUTPATIENT
Start: 2022-09-30 | End: 2022-10-10

## 2022-09-30 RX ORDER — OSELTAMIVIR PHOSPHATE 75 MG/1
75 CAPSULE ORAL DAILY
Qty: 10 CAPSULE | Refills: 0 | Status: SHIPPED | OUTPATIENT
Start: 2022-09-30 | End: 2022-09-30

## 2022-09-30 NOTE — TELEPHONE ENCOUNTER
Per patient's wife (SANDRA),[facility] does not have the TAMIFLU in stock,it will be available next week,  Would like to send the prescription to [facility] updated, medication sent per request. .

## 2022-09-30 NOTE — TELEPHONE ENCOUNTER
Spoke with pt's wife, DMITRIY verified  She requested rx for tamiflu to be [facility] to [facility] pharm  [facility] have it in stock. [facility].          HEVER

## 2022-09-30 NOTE — TELEPHONE ENCOUNTER
Dr. Celina Pressley - Can you prescribe Prophylactic Tamiflu for patient? Please use [place] in [facility] [place]. On file. Allergies verified. Call back  Wife on SANDRA [phone]  Wife of Patient called office. On SANDRA. Patient's date of birth and full name both confirmed. Their son tested positive for Influenza A, earlier today, at [facility]. Son was prescribed Tamiflu, and patient's wife read on the educational pamphlet that it can be given to others exposed to patient, to prevent flu infection. Requesting Tamiflu to prevent infection. Denies symptoms currently.    Patient's son's symptoms started 3-4 days ago, congestion, with a fever yesterday (for son)

## 2022-10-21 ENCOUNTER — LAB ENCOUNTER (OUTPATIENT)
Dept: LAB | Age: 52
End: 2022-10-21
Attending: INTERNAL MEDICINE
Payer: COMMERCIAL

## 2022-10-21 ENCOUNTER — OFFICE VISIT (OUTPATIENT)
Dept: INTERNAL MEDICINE CLINIC | Facility: CLINIC | Age: 52
End: 2022-10-21
Payer: COMMERCIAL

## 2022-10-21 VITALS
TEMPERATURE: 98 F | OXYGEN SATURATION: 98 % | DIASTOLIC BLOOD PRESSURE: 80 MMHG | BODY MASS INDEX: 29.11 KG/M2 | WEIGHT: 219.63 LBS | HEART RATE: 73 BPM | SYSTOLIC BLOOD PRESSURE: 124 MMHG | HEIGHT: 73 IN

## 2022-10-21 DIAGNOSIS — Z12.11 COLON CANCER SCREENING: ICD-10-CM

## 2022-10-21 DIAGNOSIS — G47.33 OSA (OBSTRUCTIVE SLEEP APNEA): ICD-10-CM

## 2022-10-21 DIAGNOSIS — J30.9 ALLERGIC RHINITIS, UNSPECIFIED SEASONALITY, UNSPECIFIED TRIGGER: ICD-10-CM

## 2022-10-21 DIAGNOSIS — R04.0 RECURRENT EPISTAXIS: ICD-10-CM

## 2022-10-21 DIAGNOSIS — Z00.00 ANNUAL PHYSICAL EXAM: ICD-10-CM

## 2022-10-21 DIAGNOSIS — H93.13 BILATERAL TINNITUS: ICD-10-CM

## 2022-10-21 DIAGNOSIS — Z00.00 ANNUAL PHYSICAL EXAM: Primary | ICD-10-CM

## 2022-10-21 DIAGNOSIS — K21.9 GASTROESOPHAGEAL REFLUX DISEASE WITHOUT ESOPHAGITIS: ICD-10-CM

## 2022-10-21 DIAGNOSIS — Z12.5 PROSTATE CANCER SCREENING: ICD-10-CM

## 2022-10-21 DIAGNOSIS — E78.00 HYPERCHOLESTEREMIA: ICD-10-CM

## 2022-10-21 DIAGNOSIS — I10 ESSENTIAL HYPERTENSION: ICD-10-CM

## 2022-10-21 LAB
ALBUMIN SERPL-MCNC: 3.7 G/DL (ref 3.4–5)
ALBUMIN/GLOB SERPL: 1 {RATIO} (ref 1–2)
ALP LIVER SERPL-CCNC: 73 U/L
ALT SERPL-CCNC: 46 U/L
ANION GAP SERPL CALC-SCNC: 10 MMOL/L (ref 0–18)
AST SERPL-CCNC: 24 U/L (ref 15–37)
BILIRUB SERPL-MCNC: 0.3 MG/DL (ref 0.1–2)
BUN BLD-MCNC: 15 MG/DL (ref 7–18)
BUN/CREAT SERPL: 11.5 (ref 10–20)
CALCIUM BLD-MCNC: 9 MG/DL (ref 8.5–10.1)
CHLORIDE SERPL-SCNC: 107 MMOL/L (ref 98–112)
CHOLEST SERPL-MCNC: 201 MG/DL (ref ?–200)
CO2 SERPL-SCNC: 26 MMOL/L (ref 21–32)
COMPLEXED PSA SERPL-MCNC: 3.23 NG/ML (ref ?–4)
CREAT BLD-MCNC: 1.31 MG/DL
FASTING PATIENT LIPID ANSWER: YES
FASTING STATUS PATIENT QL REPORTED: YES
GFR SERPLBLD BASED ON 1.73 SQ M-ARVRAT: 66 ML/MIN/1.73M2 (ref 60–?)
GLOBULIN PLAS-MCNC: 3.8 G/DL (ref 2.8–4.4)
GLUCOSE BLD-MCNC: 107 MG/DL (ref 70–99)
HDLC SERPL-MCNC: 36 MG/DL (ref 40–59)
LDLC SERPL CALC-MCNC: 130 MG/DL (ref ?–100)
NONHDLC SERPL-MCNC: 165 MG/DL (ref ?–130)
OSMOLALITY SERPL CALC.SUM OF ELEC: 297 MOSM/KG (ref 275–295)
POTASSIUM SERPL-SCNC: 4.5 MMOL/L (ref 3.5–5.1)
PROT SERPL-MCNC: 7.5 G/DL (ref 6.4–8.2)
SODIUM SERPL-SCNC: 143 MMOL/L (ref 136–145)
TRIGL SERPL-MCNC: 194 MG/DL (ref 30–149)
TSI SER-ACNC: 3.15 MIU/ML (ref 0.36–3.74)
VLDLC SERPL CALC-MCNC: 35 MG/DL (ref 0–30)

## 2022-10-21 PROCEDURE — 80061 LIPID PANEL: CPT

## 2022-10-21 PROCEDURE — 3008F BODY MASS INDEX DOCD: CPT | Performed by: INTERNAL MEDICINE

## 2022-10-21 PROCEDURE — 80053 COMPREHEN METABOLIC PANEL: CPT

## 2022-10-21 PROCEDURE — 84443 ASSAY THYROID STIM HORMONE: CPT

## 2022-10-21 PROCEDURE — 3074F SYST BP LT 130 MM HG: CPT | Performed by: INTERNAL MEDICINE

## 2022-10-21 PROCEDURE — 99396 PREV VISIT EST AGE 40-64: CPT | Performed by: INTERNAL MEDICINE

## 2022-10-21 PROCEDURE — 3079F DIAST BP 80-89 MM HG: CPT | Performed by: INTERNAL MEDICINE

## 2022-10-21 PROCEDURE — 36415 COLL VENOUS BLD VENIPUNCTURE: CPT

## 2022-10-21 RX ORDER — LOSARTAN POTASSIUM 50 MG/1
TABLET ORAL DAILY
COMMUNITY

## 2022-10-22 ENCOUNTER — TELEPHONE (OUTPATIENT)
Dept: INTERNAL MEDICINE CLINIC | Facility: CLINIC | Age: 52
End: 2022-10-22

## 2022-10-22 DIAGNOSIS — R79.89 ELEVATED SERUM CREATININE: ICD-10-CM

## 2022-10-22 DIAGNOSIS — R73.01 IFG (IMPAIRED FASTING GLUCOSE): Primary | ICD-10-CM

## 2022-10-25 ENCOUNTER — PATIENT MESSAGE (OUTPATIENT)
Dept: INTERNAL MEDICINE CLINIC | Facility: CLINIC | Age: 52
End: 2022-10-25

## 2022-10-26 NOTE — TELEPHONE ENCOUNTER
From: Rohan Mccarthy  To: Joselo Denis MD  Sent: 10/25/2022 8:10 PM CDT  Subject: Test Results    Dr Don Dunlap-  I will repeat the Creatinine test as you mentioned. I am a bit concerned by the word pre diabetic. Will being on a low fat and low cholesterol diet also help with the glucose? Do you have some dietary literature to aid me in following the best type of diet to lose some weight and stay away from the foods that would contribute to the cholesterol and glucose levels?      Thank you  Zach Schafer

## 2022-11-08 ENCOUNTER — PATIENT MESSAGE (OUTPATIENT)
Dept: INTERNAL MEDICINE CLINIC | Facility: CLINIC | Age: 52
End: 2022-11-08

## 2022-11-08 ENCOUNTER — LAB ENCOUNTER (OUTPATIENT)
Dept: LAB | Age: 52
End: 2022-11-08
Attending: INTERNAL MEDICINE
Payer: COMMERCIAL

## 2022-11-08 DIAGNOSIS — R79.89 ELEVATED SERUM CREATININE: ICD-10-CM

## 2022-11-08 DIAGNOSIS — R73.01 IFG (IMPAIRED FASTING GLUCOSE): ICD-10-CM

## 2022-11-08 LAB
ANION GAP SERPL CALC-SCNC: 5 MMOL/L (ref 0–18)
BUN BLD-MCNC: 13 MG/DL (ref 7–18)
BUN/CREAT SERPL: 11.7 (ref 10–20)
CALCIUM BLD-MCNC: 8.9 MG/DL (ref 8.5–10.1)
CHLORIDE SERPL-SCNC: 106 MMOL/L (ref 98–112)
CO2 SERPL-SCNC: 29 MMOL/L (ref 21–32)
CREAT BLD-MCNC: 1.11 MG/DL
EST. AVERAGE GLUCOSE BLD GHB EST-MCNC: 117 MG/DL (ref 68–126)
FASTING STATUS PATIENT QL REPORTED: YES
GFR SERPLBLD BASED ON 1.73 SQ M-ARVRAT: 81 ML/MIN/1.73M2 (ref 60–?)
GLUCOSE BLD-MCNC: 91 MG/DL (ref 70–99)
HBA1C MFR BLD: 5.7 % (ref ?–5.7)
OSMOLALITY SERPL CALC.SUM OF ELEC: 290 MOSM/KG (ref 275–295)
POTASSIUM SERPL-SCNC: 4.4 MMOL/L (ref 3.5–5.1)
SODIUM SERPL-SCNC: 140 MMOL/L (ref 136–145)

## 2022-11-08 PROCEDURE — 80048 BASIC METABOLIC PNL TOTAL CA: CPT

## 2022-11-08 PROCEDURE — 36415 COLL VENOUS BLD VENIPUNCTURE: CPT

## 2022-11-08 PROCEDURE — 83036 HEMOGLOBIN GLYCOSYLATED A1C: CPT

## 2022-11-08 NOTE — TELEPHONE ENCOUNTER
From: Aylin Knox  To: Keyanna Bass MD  Sent: 11/8/2022 9:32 AM CST  Subject: Retake Lab Work    Good morning. I went this morning to retake what I thought was only my creatine. However, it seems they took a whole panel again. Even though I did not have anything to eat this morning, I did have a glass of wine and snacked around 9pm last night. Just wanted to make sure that was ok. Also, I want to make sure redoing the whole blood panel is not going to get me dinged with my insurance. Look forward to your reply.

## 2022-11-10 ENCOUNTER — TELEPHONE (OUTPATIENT)
Dept: INTERNAL MEDICINE CLINIC | Facility: CLINIC | Age: 52
End: 2022-11-10

## 2022-11-10 DIAGNOSIS — G47.30 SLEEP APNEA, UNSPECIFIED TYPE: Primary | ICD-10-CM

## 2022-11-10 NOTE — TELEPHONE ENCOUNTER
Spoke with pt, verified , informed pt that per our managed care department Dr Nicki Rodriguez is not in network. Informed pt that [facility] was able to provide me with a list of doctors that are in network and pt will need to contact the providers to find out if they are able to obtain an oral appliance for sleep apnea, pt verbalized understanding and requesting a [employer] message to be sent with list of providers names. Request completed.

## 2022-11-14 NOTE — TELEPHONE ENCOUNTER
Pt spouse calling back and states she understands Dr Enriqueta Murphy is not in network, but his insurance did approve this provider in the past and spouse would like the referral entered under Dr Roxie Rodriguez. (see 11/8/22 my chart encounter)    Referral pended.

## 2022-11-14 NOTE — TELEPHONE ENCOUNTER
I signed thsi referral but will neeed to be approved by [facility]. I think this had been explained to the patient before that this dentist is not part of our  group [facility] and was told even if we do the referral, it will not be approved lisette [facility]. . See note of Sky before pertaining to this referral. This was what Annmarie Patel was told by [facility] last week.

## 2022-11-29 ENCOUNTER — TELEPHONE (OUTPATIENT)
Dept: INTERNAL MEDICINE CLINIC | Facility: CLINIC | Age: 52
End: 2022-11-29

## 2022-11-29 DIAGNOSIS — G47.30 SLEEP APNEA, UNSPECIFIED TYPE: Primary | ICD-10-CM

## 2022-11-29 NOTE — TELEPHONE ENCOUNTER
[facility] stating that referral was received for office visit but the x-ray codes have not been authorized and pt needs these codes authorized in order to proceed with obtaining oral appliance. New referral placed for x-ray codes and high priority message sent to managed care.

## 2022-11-29 NOTE — TELEPHONE ENCOUNTER
Managed care please obtain an authorization for referral [id number], the facility is calling for an update, new referral entered with all CPT codes that need authorization.

## 2022-12-01 NOTE — TELEPHONE ENCOUNTER
Good Morning Dr Sonam Moore and staff,    Ref# [id number] / [facility]/ Dr Bladimir Franco at phone# [phone] & unable to get thru to live person    Left complete mess on voicemail that I need all clinical notes and order to submit to [facility] for review and approval    I am unable to submit to [facility] without any clinical documention. Did your office receive any fax from Dr Jose Strong from his last office visit with them?     Please advise    Thank you    Donya Saenz

## 2022-12-01 NOTE — TELEPHONE ENCOUNTER
Attempted to contact Kathya at [facility] in regards to message below from [facility] care, left message to call back.

## 2022-12-02 NOTE — TELEPHONE ENCOUNTER
Pt's wife called to check status of the below referral.  I transferred her to [facility] Care.

## 2023-01-08 ENCOUNTER — APPOINTMENT (OUTPATIENT)
Dept: GENERAL RADIOLOGY | Age: 53
End: 2023-01-08
Attending: NURSE PRACTITIONER
Payer: COMMERCIAL

## 2023-01-08 ENCOUNTER — HOSPITAL ENCOUNTER (OUTPATIENT)
Age: 53
Discharge: HOME | End: 2023-01-08
Payer: COMMERCIAL

## 2023-01-08 VITALS
SYSTOLIC BLOOD PRESSURE: 135 MMHG | WEIGHT: 220 LBS | RESPIRATION RATE: 18 BRPM | TEMPERATURE: 99 F | HEART RATE: 109 BPM | HEIGHT: 73 IN | BODY MASS INDEX: 29.16 KG/M2 | OXYGEN SATURATION: 99 % | DIASTOLIC BLOOD PRESSURE: 91 MMHG

## 2023-01-08 DIAGNOSIS — R05.1 ACUTE COUGH: Primary | ICD-10-CM

## 2023-01-08 LAB
#MXD IC: 0.6 X10ˆ3/UL (ref 0.1–1)
ATRIAL RATE: 94 BPM
BUN BLD-MCNC: 20 MG/DL (ref 7–18)
CHLORIDE BLD-SCNC: 102 MMOL/L (ref 98–112)
CO2 BLD-SCNC: 29 MMOL/L (ref 21–32)
CREAT BLD-MCNC: 1.3 MG/DL
DDIMER WHOLE BLOOD: 342 NG/ML DDU (ref ?–400)
GFR SERPLBLD BASED ON 1.73 SQ M-ARVRAT: 67 ML/MIN/1.73M2 (ref 60–?)
GLUCOSE BLD-MCNC: 91 MG/DL (ref 70–99)
HCT VFR BLD AUTO: 44.5 %
HCT VFR BLD CALC: 47 %
HGB BLD-MCNC: 14.3 G/DL
ISTAT IONIZED CALCIUM FOR CHEM 8: 1.15 MMOL/L (ref 1.12–1.32)
LYMPHOCYTES # BLD AUTO: 0.6 X10ˆ3/UL (ref 1–4)
LYMPHOCYTES NFR BLD AUTO: 10.3 %
MCH RBC QN AUTO: 28.4 PG (ref 26–34)
MCHC RBC AUTO-ENTMCNC: 32.1 G/DL (ref 31–37)
MCV RBC AUTO: 88.5 FL (ref 80–100)
MIXED CELL %: 10.9 %
NEUTROPHILS # BLD AUTO: 4.3 X10ˆ3/UL (ref 1.5–7.7)
NEUTROPHILS NFR BLD AUTO: 78.8 %
P AXIS: 56 DEGREES
P-R INTERVAL: 156 MS
PLATELET # BLD AUTO: 181 X10ˆ3/UL (ref 150–450)
POTASSIUM BLD-SCNC: 3.8 MMOL/L (ref 3.6–5.1)
Q-T INTERVAL: 350 MS
QRS DURATION: 94 MS
QTC CALCULATION (BEZET): 437 MS
R AXIS: 18 DEGREES
RBC # BLD AUTO: 5.03 X10ˆ6/UL
SODIUM BLD-SCNC: 140 MMOL/L (ref 136–145)
T AXIS: 39 DEGREES
TROPONIN I BLD-MCNC: <0.02 NG/ML
VENTRICULAR RATE: 94 BPM
WBC # BLD AUTO: 5.5 X10ˆ3/UL (ref 4–11)

## 2023-01-08 PROCEDURE — 80047 BASIC METABLC PNL IONIZED CA: CPT | Performed by: NURSE PRACTITIONER

## 2023-01-08 PROCEDURE — 85025 COMPLETE CBC W/AUTO DIFF WBC: CPT | Performed by: NURSE PRACTITIONER

## 2023-01-08 PROCEDURE — 84484 ASSAY OF TROPONIN QUANT: CPT | Performed by: NURSE PRACTITIONER

## 2023-01-08 PROCEDURE — 93000 ELECTROCARDIOGRAM COMPLETE: CPT | Performed by: NURSE PRACTITIONER

## 2023-01-08 PROCEDURE — 36415 COLL VENOUS BLD VENIPUNCTURE: CPT | Performed by: NURSE PRACTITIONER

## 2023-01-08 PROCEDURE — 71046 X-RAY EXAM CHEST 2 VIEWS: CPT | Performed by: NURSE PRACTITIONER

## 2023-01-08 PROCEDURE — 99214 OFFICE O/P EST MOD 30 MIN: CPT | Performed by: NURSE PRACTITIONER

## 2023-01-08 RX ORDER — BENZONATATE 100 MG/1
100 CAPSULE ORAL 3 TIMES DAILY PRN
Qty: 30 CAPSULE | Refills: 0 | Status: SHIPPED | OUTPATIENT
Start: 2023-01-08 | End: 2023-02-07

## 2023-01-08 RX ORDER — PREDNISONE 20 MG/1
40 TABLET ORAL DAILY
Qty: 10 TABLET | Refills: 0 | Status: SHIPPED | OUTPATIENT
Start: 2023-01-08 | End: 2023-01-13

## 2023-01-08 NOTE — DISCHARGE INSTRUCTIONS
Please take medication as prescribed. Close follow-up with your primary care provider as recommended. Any worsening symptoms please go to the ER.

## 2023-01-08 NOTE — ED INITIAL ASSESSMENT (HPI)
Pt reports cough which started 3 days ago. Pt c/o pain to middle of his chest when coughing. Denies pain at rest. Denies cardiac history. Covid positive 12/1/22. Denies fevers.

## 2023-01-09 ENCOUNTER — PATIENT MESSAGE (OUTPATIENT)
Dept: INTERNAL MEDICINE CLINIC | Facility: CLINIC | Age: 53
End: 2023-01-09

## 2023-01-10 ENCOUNTER — NURSE TRIAGE (OUTPATIENT)
Dept: INTERNAL MEDICINE CLINIC | Facility: CLINIC | Age: 53
End: 2023-01-10

## 2023-01-10 NOTE — TELEPHONE ENCOUNTER
----- Message from Hugh Landon RN sent at 1/10/2023 11:36 AM CST -----  Regarding: FW: Antibiotic for Sinus/ Cough      ----- Message -----  From: Kusum Carlin  Sent: 1/9/2023  12:25 PM CST  To: Em Rn Triage  Subject: Antibiotic for Sinus/ Cough                      Good afternoon. [facility] was at [facility] for a bad/productive cough yesterday. I was prescribed a steroid. However, I feel a lot of drainage going into my throat which prompts the coughing. I am using Flovent HPA that Antonette had since last year to help with the irritation. Wondering if I can get Z Pack prescribed to help with the sinus?

## 2023-01-12 ENCOUNTER — OFFICE VISIT (OUTPATIENT)
Dept: INTERNAL MEDICINE CLINIC | Facility: CLINIC | Age: 53
End: 2023-01-12

## 2023-01-12 VITALS
OXYGEN SATURATION: 99 % | DIASTOLIC BLOOD PRESSURE: 76 MMHG | BODY MASS INDEX: 29.03 KG/M2 | SYSTOLIC BLOOD PRESSURE: 124 MMHG | HEART RATE: 91 BPM | WEIGHT: 219 LBS | TEMPERATURE: 98 F | HEIGHT: 73 IN

## 2023-01-12 DIAGNOSIS — R05.1 ACUTE COUGH: Primary | ICD-10-CM

## 2023-01-12 PROCEDURE — 3078F DIAST BP <80 MM HG: CPT | Performed by: INTERNAL MEDICINE

## 2023-01-12 PROCEDURE — 3008F BODY MASS INDEX DOCD: CPT | Performed by: INTERNAL MEDICINE

## 2023-01-12 PROCEDURE — 99213 OFFICE O/P EST LOW 20 MIN: CPT | Performed by: INTERNAL MEDICINE

## 2023-01-12 PROCEDURE — 3074F SYST BP LT 130 MM HG: CPT | Performed by: INTERNAL MEDICINE

## 2023-01-12 RX ORDER — AZITHROMYCIN 250 MG/1
TABLET, FILM COATED ORAL
Qty: 6 TABLET | Refills: 0 | Status: SHIPPED | OUTPATIENT
Start: 2023-01-12 | End: 2023-01-17

## 2023-01-12 RX ORDER — FLUTICASONE PROPIONATE 110 UG/1
2 AEROSOL, METERED RESPIRATORY (INHALATION) 2 TIMES DAILY
Qty: 1 EACH | Refills: 0 | Status: SHIPPED | OUTPATIENT
Start: 2023-01-12

## 2023-01-18 ENCOUNTER — MED REC SCAN ONLY (OUTPATIENT)
Dept: INTERNAL MEDICINE CLINIC | Facility: CLINIC | Age: 53
End: 2023-01-18

## 2023-01-18 ENCOUNTER — TELEPHONE (OUTPATIENT)
Dept: INTERNAL MEDICINE CLINIC | Facility: CLINIC | Age: 53
End: 2023-01-18

## 2023-01-18 DIAGNOSIS — G47.30 SLEEP APNEA, UNSPECIFIED TYPE: Primary | ICD-10-CM

## 2023-01-18 NOTE — TELEPHONE ENCOUNTER
Authorization/referral request for appliance for treatment of a sleep-related breathing disorder received from [employer]. Dr Brayan Rm a referral for your review and signature.

## 2023-01-18 NOTE — TELEPHONE ENCOUNTER
Managed care,    Please obtain a prior authorization for referral number [id number].     Thank you,    Kelly Collier

## 2023-02-07 NOTE — TELEPHONE ENCOUNTER
[address]  at [phone], spoke with Ambrosio Bolivar, requested recent office visit notes to get faxed to Dr Wisam Lowery at [phone].

## 2023-02-07 NOTE — TELEPHONE ENCOUNTER
Lara Zaman,    I have faxed the office notes to [phone], confirmation was received.      Thank you,    Margaret Salas

## 2023-02-16 ENCOUNTER — MED REC SCAN ONLY (OUTPATIENT)
Dept: INTERNAL MEDICINE CLINIC | Facility: CLINIC | Age: 53
End: 2023-02-16

## 2023-02-16 NOTE — TELEPHONE ENCOUNTER
[address] at [phone], spoke with Sharri Aguila obtained fax number where authorization can be faxed. Authorization for device faxed to [facility], tyshawn Rasheed at [phone], confirmation received.

## 2023-02-26 RX ORDER — FLUOXETINE 10 MG/1
10 CAPSULE ORAL DAILY
Qty: 90 CAPSULE | Refills: 1 | Status: SHIPPED | OUTPATIENT
Start: 2023-02-26

## 2023-02-26 NOTE — TELEPHONE ENCOUNTER
Refill passed per [facility] [facility] protocol.   Requested Prescriptions   Pending Prescriptions Disp Refills    FLUOXETINE 10 MG Oral Cap [Pharmacy Med Name: FLUOXETINE HCL 10 MG CAPSULE] 90 capsule 1     Sig: TAKE 1 CAPSULE BY MOUTH EVERY DAY       Psychiatric Non-Scheduled (Anti-Anxiety) Passed - 2/26/2023 12:48 AM        Passed - In person appointment or virtual visit in the past 6 mos or appointment in next 3 mos     Recent Outpatient Visits              1 month ago Acute cough    [address], [address], Kvng Hernandez MD    Office Visit    4 months ago Annual physical exam    Kvng Shetty MD    Office Visit    5 months ago Vasovagal near syncope    Kvng Shetty MD    Office Visit    7 months ago Acute bilateral low back pain, unspecified whether sciatica present    [address], [place] [place], Kvng Hernandez MD    Office Visit    9 months ago Bilateral tinnitus    Juju Shetty MD    Office Visit

## 2023-03-18 RX ORDER — LOSARTAN POTASSIUM 50 MG/1
50 TABLET ORAL DAILY
Qty: 90 TABLET | Refills: 3 | Status: SHIPPED | OUTPATIENT
Start: 2023-03-18

## 2023-03-18 NOTE — TELEPHONE ENCOUNTER
Refill passed per [facility] protocol. Requested Prescriptions   Pending Prescriptions Disp Refills    LOSARTAN 50 MG Oral Tab [Pharmacy Med Name: LOSARTAN POTASSIUM 50 MG TAB] 90 tablet 1     Sig: TAKE 1 TABLET BY MOUTH EVERY DAY       Hypertensive Medications Protocol Passed - 3/18/2023 12:54 AM        Passed - In person appointment in the past 12 or next 3 months     Recent Outpatient Visits              2 months ago Acute cough    [address], [address], Adal Bradley MD    Office Visit    4 months ago Annual physical exam    [address] [facility], Collette Leyland, MD    Office Visit    5 months ago Vasovagal near syncope    [address], [address], Collette Leyland, MD    Office Visit    8 months ago Acute bilateral low back pain, unspecified whether sciatica present    [address], [address], Collette Leyland, MD    Office Visit    10 months ago Bilateral tinnitus    [address], [address], Adal Khan, Jakub Hanley MD    Office Visit                      Passed - Last BP reading less than 140/90     BP Readings from Last 1 Encounters:  01/12/23 : 124/76              Passed - CMP or BMP in past 6 months     Recent Results (from the past 4392 hour(s))   BASIC METABOLIC PANEL (8)    Collection Time: 11/08/22  7:48 AM   Result Value Ref Range    Glucose 91 70 - 99 mg/dL    Sodium 140 136 - 145 mmol/L    Potassium 4.4 3.5 - 5.1 mmol/L    Chloride 106 98 - 112 mmol/L    CO2 29.0 21.0 - 32.0 mmol/L    Anion Gap 5 0 - 18 mmol/L    BUN 13 7 - 18 mg/dL    Creatinine 1.11 0.70 - 1.30 mg/dL    BUN/CREA Ratio 11.7 10.0 - 20.0    Calcium, Total 8.9 8.5 - 10.1 mg/dL    Calculated Osmolality 290 275 - 295 mOsm/kg    eGFR-Cr 81 >=60 mL/min/1.73m2    Patient Fasting for BMP?  Yes      *Note: Due to a large number of results and/or encounters for the requested time period, some results have not been displayed. A complete set of results can be found in Results Review.                Passed - In person appointment or virtual visit in the past 6 months     Recent Outpatient Visits              2 months ago Acute cough    [facility] [place], [address], Scott Ghosh MD    Office Visit    4 months ago Annual physical exam    [address], Scott Ghosh MD    Office Visit    5 months ago Vasovagal near syncope    [address], Scott Ghosh MD    Office Visit    8 months ago Acute bilateral low back pain, unspecified whether sciatica present    [address], Scott Ghosh MD    Office Visit    10 months ago Bilateral tinnitus    [address] [facility], Ruben Buckner MD    Office Visit                      Passed - EGFRCR or GFRNAA > 50     GFR Evaluation  EGFRCR: 67 , resulted on 1/8/2023               Recent Outpatient Visits              2 months ago Acute cough    [place], [address], Scott Ghosh MD    Office Visit    4 months ago Annual physical exam    [address], Scott Ghosh MD    Office Visit    5 months ago Vasovagal near syncope    [address] [facility], Scott Ghosh MD    Office Visit    8 months ago Acute bilateral low back pain, unspecified whether sciatica present    [address], Scott Ghosh MD    Office Visit    10 months ago Bilateral tinnitus    [address] [facility], Ruben Buckner MD    Office Visit

## 2023-04-17 ENCOUNTER — OFFICE VISIT (OUTPATIENT)
Dept: INTERNAL MEDICINE CLINIC | Facility: CLINIC | Age: 53
End: 2023-04-17

## 2023-04-17 ENCOUNTER — ORDER TRANSCRIPTION (OUTPATIENT)
Dept: SLEEP CENTER | Age: 53
End: 2023-04-17

## 2023-04-17 VITALS
HEIGHT: 73 IN | HEART RATE: 92 BPM | OXYGEN SATURATION: 99 % | TEMPERATURE: 98 F | WEIGHT: 225.38 LBS | SYSTOLIC BLOOD PRESSURE: 142 MMHG | DIASTOLIC BLOOD PRESSURE: 86 MMHG | BODY MASS INDEX: 29.87 KG/M2

## 2023-04-17 DIAGNOSIS — G47.33 OBSTRUCTIVE SLEEP APNEA (ADULT) (PEDIATRIC): Primary | ICD-10-CM

## 2023-04-17 DIAGNOSIS — I10 ESSENTIAL HYPERTENSION: ICD-10-CM

## 2023-04-17 DIAGNOSIS — R00.0 INCREASED HEART RATE: ICD-10-CM

## 2023-04-17 DIAGNOSIS — M25.511 ACUTE PAIN OF RIGHT SHOULDER: Primary | ICD-10-CM

## 2023-04-17 PROCEDURE — 3079F DIAST BP 80-89 MM HG: CPT | Performed by: INTERNAL MEDICINE

## 2023-04-17 PROCEDURE — 99214 OFFICE O/P EST MOD 30 MIN: CPT | Performed by: INTERNAL MEDICINE

## 2023-04-17 PROCEDURE — 3077F SYST BP >= 140 MM HG: CPT | Performed by: INTERNAL MEDICINE

## 2023-04-17 PROCEDURE — 3008F BODY MASS INDEX DOCD: CPT | Performed by: INTERNAL MEDICINE

## 2023-04-17 RX ORDER — LORATADINE AND PSEUDOEPHEDRINE SULFATE 5; 120 MG/1; MG/1
1 TABLET, EXTENDED RELEASE ORAL 2 TIMES DAILY
Qty: 180 TABLET | Refills: 1 | Status: SHIPPED | OUTPATIENT
Start: 2023-04-17

## 2023-04-17 RX ORDER — LOSARTAN POTASSIUM AND HYDROCHLOROTHIAZIDE 12.5; 5 MG/1; MG/1
1 TABLET ORAL DAILY
Qty: 90 TABLET | Refills: 1 | Status: SHIPPED | OUTPATIENT
Start: 2023-04-17 | End: 2023-10-14

## 2023-04-20 ENCOUNTER — HOSPITAL ENCOUNTER (OUTPATIENT)
Dept: CV DIAGNOSTICS | Facility: HOSPITAL | Age: 53
Discharge: HOME | End: 2023-04-20
Attending: INTERNAL MEDICINE
Payer: COMMERCIAL

## 2023-04-20 DIAGNOSIS — R00.0 INCREASED HEART RATE: ICD-10-CM

## 2023-04-20 PROCEDURE — 93246 EXT ECG>7D<15D RECORDING: CPT | Performed by: INTERNAL MEDICINE

## 2023-04-20 PROCEDURE — 93247 EXT ECG>7D<15D SCAN A/R: CPT | Performed by: INTERNAL MEDICINE

## 2023-05-05 ENCOUNTER — HOSPITAL ENCOUNTER (OUTPATIENT)
Dept: GENERAL RADIOLOGY | Facility: HOSPITAL | Age: 53
Discharge: HOME | End: 2023-05-05
Attending: ORTHOPAEDIC SURGERY
Payer: COMMERCIAL

## 2023-05-05 ENCOUNTER — OFFICE VISIT (OUTPATIENT)
Dept: ORTHOPEDICS CLINIC | Facility: CLINIC | Age: 53
End: 2023-05-05

## 2023-05-05 VITALS — BODY MASS INDEX: 29.16 KG/M2 | WEIGHT: 220 LBS | HEIGHT: 73 IN

## 2023-05-05 DIAGNOSIS — R52 PAIN: Primary | ICD-10-CM

## 2023-05-05 DIAGNOSIS — M67.911 DISORDER OF RIGHT ROTATOR CUFF: ICD-10-CM

## 2023-05-05 DIAGNOSIS — R52 PAIN: ICD-10-CM

## 2023-05-05 PROCEDURE — 73030 X-RAY EXAM OF SHOULDER: CPT | Performed by: ORTHOPAEDIC SURGERY

## 2023-05-05 PROCEDURE — 99204 OFFICE O/P NEW MOD 45 MIN: CPT | Performed by: ORTHOPAEDIC SURGERY

## 2023-05-05 PROCEDURE — 3008F BODY MASS INDEX DOCD: CPT | Performed by: ORTHOPAEDIC SURGERY

## 2023-05-05 RX ORDER — MELOXICAM 15 MG/1
15 TABLET ORAL DAILY
Qty: 30 TABLET | Refills: 0 | Status: SHIPPED | OUTPATIENT
Start: 2023-05-05

## 2023-05-08 ENCOUNTER — PATIENT MESSAGE (OUTPATIENT)
Dept: INTERNAL MEDICINE CLINIC | Facility: CLINIC | Age: 53
End: 2023-05-08

## 2023-05-08 DIAGNOSIS — M25.511 ACUTE PAIN OF RIGHT SHOULDER: Primary | ICD-10-CM

## 2023-05-08 NOTE — TELEPHONE ENCOUNTER
From: Camilo Bennett  To: Tomer Nieves MD  Sent: 5/8/2023 11:11 AM CDT  Subject: Follow Up- Dr Mark Meier    I have a follow up with Dr Lesa Waller on 05/31. Not sure if my original referral had more than one visit. If it did not I will need another one. Thank you.

## 2023-05-24 ENCOUNTER — HOSPITAL ENCOUNTER (OUTPATIENT)
Dept: MRI IMAGING | Age: 53
Discharge: HOME | End: 2023-05-24
Attending: ORTHOPAEDIC SURGERY
Payer: COMMERCIAL

## 2023-05-24 DIAGNOSIS — M67.911 DISORDER OF RIGHT ROTATOR CUFF: ICD-10-CM

## 2023-05-24 PROCEDURE — 73221 MRI JOINT UPR EXTREM W/O DYE: CPT | Performed by: ORTHOPAEDIC SURGERY

## 2023-05-31 ENCOUNTER — OFFICE VISIT (OUTPATIENT)
Dept: ORTHOPEDICS CLINIC | Facility: CLINIC | Age: 53
End: 2023-05-31

## 2023-05-31 VITALS
WEIGHT: 218.81 LBS | BODY MASS INDEX: 29 KG/M2 | HEART RATE: 97 BPM | HEIGHT: 73 IN | DIASTOLIC BLOOD PRESSURE: 98 MMHG | SYSTOLIC BLOOD PRESSURE: 138 MMHG

## 2023-05-31 DIAGNOSIS — M67.911 DISORDER OF RIGHT ROTATOR CUFF: Primary | ICD-10-CM

## 2023-05-31 PROCEDURE — 3075F SYST BP GE 130 - 139MM HG: CPT | Performed by: ORTHOPAEDIC SURGERY

## 2023-05-31 PROCEDURE — 99214 OFFICE O/P EST MOD 30 MIN: CPT | Performed by: ORTHOPAEDIC SURGERY

## 2023-05-31 PROCEDURE — 20610 DRAIN/INJ JOINT/BURSA W/O US: CPT | Performed by: ORTHOPAEDIC SURGERY

## 2023-05-31 PROCEDURE — 3080F DIAST BP >= 90 MM HG: CPT | Performed by: ORTHOPAEDIC SURGERY

## 2023-05-31 PROCEDURE — 3008F BODY MASS INDEX DOCD: CPT | Performed by: ORTHOPAEDIC SURGERY

## 2023-05-31 RX ORDER — TRIAMCINOLONE ACETONIDE 40 MG/ML
40 INJECTION, SUSPENSION INTRA-ARTICULAR; INTRAMUSCULAR ONCE
Status: COMPLETED | OUTPATIENT
Start: 2023-05-31 | End: 2023-05-31

## 2023-05-31 NOTE — PROCEDURES
Per verbal order from Dr. Jeannette Toscano, draw up and 4ml of 0.5% Marcaine and 1ml of Kenalog 40 for injection into [facility] [place]  Patient provided education handout for cortisone injection.

## 2023-06-01 RX ORDER — MELOXICAM 15 MG/1
15 TABLET ORAL DAILY
Qty: 30 TABLET | Refills: 0 | Status: SHIPPED | OUTPATIENT
Start: 2023-06-01

## 2023-06-01 NOTE — TELEPHONE ENCOUNTER
Rx request for Meloxicam 15 mg, please review and sign off if appropriate. Thank you. Last seen: 5/31/22  Last refill: 5/5/23 #30 with 0 refills.

## 2023-06-22 RX ORDER — MELOXICAM 15 MG/1
15 TABLET ORAL DAILY
Qty: 30 TABLET | Refills: 0 | Status: SHIPPED | OUTPATIENT
Start: 2023-06-22

## 2023-06-22 NOTE — TELEPHONE ENCOUNTER
Rx request for Meloxicam 15mg, please review and sign off if appropriate. Thank you. Last seen: 5/31/23  Last refill; 6/1/23 #30 with 0 refills.

## 2023-06-22 NOTE — TELEPHONE ENCOUNTER
Approved Daily Note     Today's date: 2022  Patient name: Julia Perry  : 2017  MRN: [id number]  Referring provider: Nan Seals MD  Dx:   Encounter Diagnosis     ICD-10-CM    1  Developmental delay  R62 50                 Subjective: Domingo arrived to OT session with father who remained in session  Child seen as a cotx with SLP to maximize  Pt seen in mat pit  Objective:  Short term goals:  STG #1: Kuldip Herrera will demonstrate improvements in attention and self-regulation as evidenced by ability to attend single play activity for >2m with mod A for redirection, across 3 consecutive sessions, within this episode of care  Pt demonstrated improved attention on this date when engaging in various activities  Pt was able to attend to x3 tasks (hamburger tower, squigz, and coloring) for ~5 minutes  Pt demonstrated decreased self-regulation at times and demonstrated need for proprioceptive input, standing from table-top activity and engaging in gross motor task  Pt was able to jump, climb, and obtain squeezes prior to being redirected to task  STG #2: Kludip Herrera will demonstrate improvements in object manipulation and joint attention as evidenced by ability to roll playground ball back-and-forth with clinician >3x within this episode of care  Goal met; Kuldip Herrera is demonstrating excellent joint attention to therapists t/o treatment sessions  Makes frequent and intentional eye contact during play; however, demonstrated difficulty sharing toys with therapist at times  STG #3: Kuldip Herrera will demonstrate improvements in self-care as evidenced by ability to doff B sneakers with max VC, given supportive seating position, within this episode of care  Pt required total A to don shoes at conclusion of session with difficulty transitioning from mat pit to steps  Attempted elopement      STG #4: Kuldip Herrera will demonstrate improvements in flexibility and play as evidenced by ability to tolerate expansion of preferred play routines without becoming dysregulated or eloping from task, 50% of the time, within this episode of care  Attempted to engage pt in imaginative play schemes with Candid io and form board; however demonstrated difficulty at times  Pt was able to imitate flying the airplane in the yoandy  On this date, pt demonstrated ability to point to preferred toy or was able to select from a field of two  Pt demonstrated difficulty when transitioning from toy and often demonstrated throwing the toy  Pt used AAC device throughout with min-max A while engaging in directed activities  Pt was able to select various activities from a choice of two or by pointing to the activity  Pt demonstrated smiles throughout session  Required Che[place]huevi to engage in bilateral hand play  Tended to use R hand when engaging with coloring/dotting activity  Tended to demonstrate increased strength when dotting and benefited from verbal cues and proprioceptive input for more gentle engagement  Plan: Continue per plan of care  Assessment: Tolerated treatment well  Patient would benefit from continued OT  Due to increased behaviors of throwing objects, pt may benefit from trialing an "all done" container to encourage pt to place item in the container verse throwing  Plan: Continue per plan of care

## 2023-06-27 ENCOUNTER — NURSE TRIAGE (OUTPATIENT)
Dept: INTERNAL MEDICINE CLINIC | Facility: CLINIC | Age: 53
End: 2023-06-27

## 2023-06-27 ENCOUNTER — HOSPITAL ENCOUNTER (EMERGENCY)
Facility: HOSPITAL | Age: 53
Discharge: HOME | End: 2023-06-27
Attending: EMERGENCY MEDICINE
Payer: COMMERCIAL

## 2023-06-27 VITALS
HEART RATE: 86 BPM | TEMPERATURE: 98 F | SYSTOLIC BLOOD PRESSURE: 123 MMHG | BODY MASS INDEX: 29.16 KG/M2 | HEIGHT: 73 IN | OXYGEN SATURATION: 94 % | RESPIRATION RATE: 20 BRPM | WEIGHT: 220 LBS | DIASTOLIC BLOOD PRESSURE: 84 MMHG

## 2023-06-27 DIAGNOSIS — R10.13 ABDOMINAL WALL PAIN IN EPIGASTRIC REGION: Primary | ICD-10-CM

## 2023-06-27 LAB
ALBUMIN SERPL-MCNC: 3.6 G/DL (ref 3.4–5)
ALBUMIN/GLOB SERPL: 1 {RATIO} (ref 1–2)
ALP LIVER SERPL-CCNC: 58 U/L
ALT SERPL-CCNC: 40 U/L
ANION GAP SERPL CALC-SCNC: 7 MMOL/L (ref 0–18)
AST SERPL-CCNC: 22 U/L (ref 15–37)
BASOPHILS # BLD AUTO: 0.02 X10(3) UL (ref 0–0.2)
BASOPHILS NFR BLD AUTO: 0.3 %
BILIRUB SERPL-MCNC: 0.5 MG/DL (ref 0.1–2)
BUN BLD-MCNC: 14 MG/DL (ref 7–18)
BUN/CREAT SERPL: 12.4 (ref 10–20)
CALCIUM BLD-MCNC: 9 MG/DL (ref 8.5–10.1)
CHLORIDE SERPL-SCNC: 107 MMOL/L (ref 98–112)
CK SERPL-CCNC: 185 U/L
CO2 SERPL-SCNC: 28 MMOL/L (ref 21–32)
CREAT BLD-MCNC: 1.13 MG/DL
DEPRECATED RDW RBC AUTO: 41.6 FL (ref 35.1–46.3)
EOSINOPHIL # BLD AUTO: 0.21 X10(3) UL (ref 0–0.7)
EOSINOPHIL NFR BLD AUTO: 2.9 %
ERYTHROCYTE [DISTWIDTH] IN BLOOD BY AUTOMATED COUNT: 12.8 % (ref 11–15)
GFR SERPLBLD BASED ON 1.73 SQ M-ARVRAT: 79 ML/MIN/1.73M2 (ref 60–?)
GLOBULIN PLAS-MCNC: 3.6 G/DL (ref 2.8–4.4)
GLUCOSE BLD-MCNC: 108 MG/DL (ref 70–99)
HCT VFR BLD AUTO: 43.4 %
HGB BLD-MCNC: 14.2 G/DL
IMM GRANULOCYTES # BLD AUTO: 0.01 X10(3) UL (ref 0–1)
IMM GRANULOCYTES NFR BLD: 0.1 %
LIPASE SERPL-CCNC: 36 U/L (ref 13–75)
LYMPHOCYTES # BLD AUTO: 1.52 X10(3) UL (ref 1–4)
LYMPHOCYTES NFR BLD AUTO: 20.9 %
MCH RBC QN AUTO: 29.2 PG (ref 26–34)
MCHC RBC AUTO-ENTMCNC: 32.7 G/DL (ref 31–37)
MCV RBC AUTO: 89.1 FL
MONOCYTES # BLD AUTO: 0.48 X10(3) UL (ref 0.1–1)
MONOCYTES NFR BLD AUTO: 6.6 %
NEUTROPHILS # BLD AUTO: 5.03 X10 (3) UL (ref 1.5–7.7)
NEUTROPHILS # BLD AUTO: 5.03 X10(3) UL (ref 1.5–7.7)
NEUTROPHILS NFR BLD AUTO: 69.2 %
OSMOLALITY SERPL CALC.SUM OF ELEC: 295 MOSM/KG (ref 275–295)
PLATELET # BLD AUTO: 197 10(3)UL (ref 150–450)
POTASSIUM SERPL-SCNC: 3 MMOL/L (ref 3.5–5.1)
PROT SERPL-MCNC: 7.2 G/DL (ref 6.4–8.2)
RBC # BLD AUTO: 4.87 X10(6)UL
SODIUM SERPL-SCNC: 142 MMOL/L (ref 136–145)
WBC # BLD AUTO: 7.3 X10(3) UL (ref 4–11)

## 2023-06-27 PROCEDURE — 99284 EMERGENCY DEPT VISIT MOD MDM: CPT

## 2023-06-27 PROCEDURE — 96374 THER/PROPH/DIAG INJ IV PUSH: CPT

## 2023-06-27 PROCEDURE — 83690 ASSAY OF LIPASE: CPT | Performed by: EMERGENCY MEDICINE

## 2023-06-27 PROCEDURE — 85025 COMPLETE CBC W/AUTO DIFF WBC: CPT | Performed by: EMERGENCY MEDICINE

## 2023-06-27 PROCEDURE — 80053 COMPREHEN METABOLIC PANEL: CPT | Performed by: EMERGENCY MEDICINE

## 2023-06-27 PROCEDURE — 82550 ASSAY OF CK (CPK): CPT | Performed by: EMERGENCY MEDICINE

## 2023-06-27 RX ORDER — POTASSIUM CHLORIDE 20 MEQ/1
20 TABLET, EXTENDED RELEASE ORAL 2 TIMES DAILY
Qty: 6 TABLET | Refills: 0 | Status: SHIPPED | OUTPATIENT
Start: 2023-06-27 | End: 2023-06-30

## 2023-06-27 RX ORDER — POTASSIUM CHLORIDE 20 MEQ/1
40 TABLET, EXTENDED RELEASE ORAL ONCE
Status: COMPLETED | OUTPATIENT
Start: 2023-06-27 | End: 2023-06-27

## 2023-06-27 RX ORDER — METHOCARBAMOL 100 MG/ML
1 INJECTION, SOLUTION INTRAMUSCULAR; INTRAVENOUS ONCE
Status: COMPLETED | OUTPATIENT
Start: 2023-06-27 | End: 2023-06-27

## 2023-06-27 RX ORDER — METHOCARBAMOL 500 MG/1
500 TABLET, FILM COATED ORAL 4 TIMES DAILY PRN
Qty: 20 TABLET | Refills: 0 | Status: SHIPPED | OUTPATIENT
Start: 2023-06-27 | End: 2023-07-02

## 2023-06-27 NOTE — ED INITIAL ASSESSMENT (HPI)
Pt to ED with c/o epigastric pain x1 week. Pt states a couple episodes of diarrhea that have resolved. Pt denies n/v, fever, or urinary changes.

## 2023-06-28 ENCOUNTER — PATIENT OUTREACH (OUTPATIENT)
Dept: CASE MANAGEMENT | Age: 53
End: 2023-06-28

## 2023-06-29 ENCOUNTER — OFFICE VISIT (OUTPATIENT)
Dept: SLEEP CENTER | Age: 53
End: 2023-06-29
Attending: DENTIST
Payer: COMMERCIAL

## 2023-06-29 PROCEDURE — 95806 SLEEP STUDY UNATT&RESP EFFT: CPT

## 2023-07-10 ENCOUNTER — TELEPHONE (OUTPATIENT)
Dept: PULMONOLOGY | Facility: CLINIC | Age: 53
End: 2023-07-10

## 2023-07-10 NOTE — TELEPHONE ENCOUNTER
----- Message from Grace Leslie DO sent at 7/6/2023  4:19 PM CDT -----  You may let the patient know his study results revealed moderate sleep apnea despite using oral appliance. Okay to schedule visit to discuss further treatment options if he wants to discuss this.   Okay to double book sometime second half of July if need be

## 2023-07-13 NOTE — TELEPHONE ENCOUNTER
Spoke with patient states Dr. Grey Mendiola from [address] [place] [address] ordered the patient's sleep study, requesting copy of results faxed to Dr. Grey Mendiola at [facility] at [phone]. Sleep Study faxed, confirmation received.  Patient states he will follow up with Dr. Grey Mendiola and call pulmo office back if he requires a visit with Dr. Guicho Harmon

## 2023-07-19 RX ORDER — MELOXICAM 15 MG/1
15 TABLET ORAL DAILY
Qty: 30 TABLET | Refills: 0 | Status: SHIPPED | OUTPATIENT
Start: 2023-07-19

## 2023-08-19 NOTE — TELEPHONE ENCOUNTER
Passes protocol but with POP UP HIGH WARNING ALERT due to Fluoxetine and Meloxicam .    High  Drug-Drug: FLUoxetine and MeloxicamToxic effects may be increased with concurrent administration of NSAIDs and Selective Serotonin Reuptake Inhibitors. The risk of upper gastrointestinal bleeding may be increased. Patients taking both drugs concurrently should be educated about the signs and symptoms of GI bleeding. Refill passed per [address] protocol.      Requested Prescriptions   Pending Prescriptions Disp Refills    FLUOXETINE 10 MG Oral Cap [Pharmacy Med Name: FLUOXETINE HCL 10 MG CAPSULE] 90 capsule 1     Sig: TAKE 1 CAPSULE BY MOUTH EVERY DAY       Psychiatric Non-Scheduled (Anti-Anxiety) Passed - 8/18/2023  9:52 AM        Passed - In person appointment or virtual visit in the past 6 mos or appointment in next 3 mos     Recent Outpatient Visits              1 month ago     [address] [facility]    Office Visit    2 months ago Disorder of right rotator cuff    Dahiana Royal MD    Office Visit    3 months ago Pain    N[facility] Corral Bridget Husband, MD    Office Visit    4 months ago Acute pain of right shoulder    [address], [address], Jad Castro MD    Office Visit    7 months ago Acute cough    [address], [address], Jad Castro MD    Office Visit                                 Recent Outpatient Visits              1 month ago     [address] [facility]    Office Visit    2 months ago Disorder of right rotator cuff    Edilma Corral MD    Office Visit    3 months ago Pain    Nima Corral Bridget Husband, MD    Office Visit    4 months ago Acute pain of right shoulder    [facility] Martha Sheldon, Adal Salomon [place], MD    Office Visit    7 months ago Acute cough    [address], [address], Roxie Buck MD    Office Visit

## 2023-08-21 NOTE — HPI: RASH
What Type Of Note Output Would You Prefer (Optional)?: Bullet Format
Is This A New Presentation, Or A Follow-Up?: Rash
Azathioprine Pregnancy And Lactation Text: This medication is Pregnancy Category D and isn't considered safe during pregnancy. It is unknown if this medication is excreted in breast milk.

## 2023-08-22 RX ORDER — FLUOXETINE 10 MG/1
10 CAPSULE ORAL DAILY
Qty: 90 CAPSULE | Refills: 1 | Status: SHIPPED | OUTPATIENT
Start: 2023-08-22

## 2023-09-25 ENCOUNTER — IMMUNIZATION (OUTPATIENT)
Dept: INTERNAL MEDICINE CLINIC | Facility: CLINIC | Age: 53
End: 2023-09-25

## 2023-09-25 DIAGNOSIS — Z23 NEED FOR VACCINATION: Primary | ICD-10-CM

## 2023-09-27 PROCEDURE — 90471 IMMUNIZATION ADMIN: CPT | Performed by: INTERNAL MEDICINE

## 2023-09-27 PROCEDURE — 90686 IIV4 VACC NO PRSV 0.5 ML IM: CPT | Performed by: INTERNAL MEDICINE

## 2023-10-10 RX ORDER — LOSARTAN POTASSIUM AND HYDROCHLOROTHIAZIDE 12.5; 5 MG/1; MG/1
1 TABLET ORAL DAILY
Qty: 90 TABLET | Refills: 1 | Status: SHIPPED | OUTPATIENT
Start: 2023-10-10

## 2023-10-10 NOTE — TELEPHONE ENCOUNTER
Refill passed per [facility] protocol.     Requested Prescriptions   Pending Prescriptions Disp Refills    LOSARTAN-HYDROCHLOROTHIAZIDE 50-12.5 MG Oral Tab [Pharmacy Med Name: LOSARTAN-HCTZ 50-12.5 MG TAB] 90 tablet 1     Sig: TAKE 1 TABLET BY MOUTH EVERY DAY       Hypertensive Medications Protocol Passed - 10/9/2023  1:03 AM        Passed - In person appointment in the past 12 or next 3 months     Recent Outpatient Visits              3 months ago     [facility]    Office Visit    4 months ago Disorder of right rotator cuff    [facility]    Office Visit    5 months ago Pain    [facility] [place] Angel Waters MD    Office Visit    5 months ago Acute pain of right shoulder    [facility], Anthony Plummer MD    Office Visit    9 months ago Acute cough    [facility], Anthony Plummer MD    Office Visit                      Passed - Last BP reading less than 140/90     BP Readings from Last 1 Encounters:  06/27/23 : 123/84              Passed - CMP or BMP in past 6 months     Recent Results (from the past 4392 hour(s))   Comp Metabolic Panel (14)    Collection Time: 06/27/23  5:33 PM   Result Value Ref Range    Glucose 108 (H) 70 - 99 mg/dL    Sodium 142 136 - 145 mmol/L    Potassium 3.0 (L) 3.5 - 5.1 mmol/L    Chloride 107 98 - 112 mmol/L    CO2 28.0 21.0 - 32.0 mmol/L    Anion Gap 7 0 - 18 mmol/L    BUN 14 7 - 18 mg/dL    Creatinine 1.13 0.70 - 1.30 mg/dL    BUN/CREA Ratio 12.4 10.0 - 20.0    Calcium, Total 9.0 8.5 - 10.1 mg/dL    Calculated Osmolality 295 275 - 295 mOsm/kg    eGFR-Cr 79 >=60 mL/min/1.73m2    ALT 40 16 - 61 U/L    AST 22 15 - 37 U/L    Alkaline Phosphatase 58 45 - 117 U/L    Bilirubin, Total 0.5 0.1 - 2.0 mg/dL    Total Protein 7.2 6.4 - 8.2 g/dL    Albumin 3.6 3.4 - 5.0 g/dL     Globulin  3.6 2.8 - 4.4 g/dL    A/G Ratio 1.0 1.0 - 2.0     *Note: Due to a large number of results and/or encounters for the requested time period, some results have not been displayed. A complete set of results can be found in Results Review.               Passed - In person appointment or virtual visit in the past 6 months     Recent Outpatient Visits              3 months ago     [facility]    Office Visit    4 months ago Disorder of right rotator cuff    [facility] [place]    Office Visit    5 months ago Pain    [facility] [place]    Office Visit    5 months ago Acute pain of right shoulder    [facility], Anthony Plummer MD    Office Visit    9 months ago Acute cough    [facility], [place]    Office Visit                      Passed - EGFRCR or GFRNAA > 50     GFR Evaluation  EGFRCR: 79 , resulted on 6/27/2023

## 2024-02-01 NOTE — TELEPHONE ENCOUNTER
Dr. Minh Diaz signed order for dental appliance through [address] [place] [address]. Pt notified that order faxed to [phone]. I re ordered same ones from his medication list so I'm not sure what the issue is  You can call the pharmacy and see what prior order was and ask what is different please   Type of surgery:   IMAGE GUIDED FUNCTIONAL ENDOSCOPIC SINUS SURGERY (FESS) WITHOUT SEPTOPLASTY, WITH HYDRODEBRIDER (Bilateral  CPT 52869,12916,49460,65743   Chronic maxillary sinusitis j32.0     S/P FESS (functional endoscopic sinus surgery) z98.890     S/P nasal septoplasty z98.890    Location of surgery: [facility]  Date and time of surgery: 06/15/2023  Surgeon: ESTEFANIA  Pre-Op Appt Date: 06/08/2023  Post-Op Appt Date: 06/22/2023   Packet sent out: Yes  Pre-cert/Authorization completed:  No prior auth required per [facility] at [facility]. Call ref# [id number]    Date: 5-5-23    Kaye Resendez  Financial Securing/Prior Auth Dept  [phone]

## 2024-02-05 ENCOUNTER — LAB ENCOUNTER (OUTPATIENT)
Dept: LAB | Age: 54
End: 2024-02-05
Attending: INTERNAL MEDICINE
Payer: COMMERCIAL

## 2024-02-05 ENCOUNTER — EKG ENCOUNTER (OUTPATIENT)
Dept: LAB | Age: 54
End: 2024-02-05
Attending: INTERNAL MEDICINE
Payer: COMMERCIAL

## 2024-02-05 ENCOUNTER — OFFICE VISIT (OUTPATIENT)
Dept: INTERNAL MEDICINE CLINIC | Facility: CLINIC | Age: 54
End: 2024-02-05
Payer: COMMERCIAL

## 2024-02-05 VITALS
HEIGHT: 73 IN | OXYGEN SATURATION: 96 % | WEIGHT: 224.63 LBS | HEART RATE: 81 BPM | DIASTOLIC BLOOD PRESSURE: 80 MMHG | TEMPERATURE: 97 F | SYSTOLIC BLOOD PRESSURE: 124 MMHG | BODY MASS INDEX: 29.77 KG/M2

## 2024-02-05 DIAGNOSIS — E78.00 HYPERCHOLESTEREMIA: ICD-10-CM

## 2024-02-05 DIAGNOSIS — I10 ESSENTIAL HYPERTENSION: ICD-10-CM

## 2024-02-05 DIAGNOSIS — Z00.00 ANNUAL PHYSICAL EXAM: ICD-10-CM

## 2024-02-05 DIAGNOSIS — Z12.5 PROSTATE CANCER SCREENING: ICD-10-CM

## 2024-02-05 DIAGNOSIS — R73.01 IFG (IMPAIRED FASTING GLUCOSE): ICD-10-CM

## 2024-02-05 DIAGNOSIS — Z12.11 COLON CANCER SCREENING: ICD-10-CM

## 2024-02-05 DIAGNOSIS — J30.9 ALLERGIC RHINITIS, UNSPECIFIED SEASONALITY, UNSPECIFIED TRIGGER: ICD-10-CM

## 2024-02-05 DIAGNOSIS — G47.33 OSA (OBSTRUCTIVE SLEEP APNEA): ICD-10-CM

## 2024-02-05 DIAGNOSIS — H93.13 TINNITUS OF BOTH EARS: ICD-10-CM

## 2024-02-05 DIAGNOSIS — Z00.00 ANNUAL PHYSICAL EXAM: Primary | ICD-10-CM

## 2024-02-05 DIAGNOSIS — K21.9 GASTROESOPHAGEAL REFLUX DISEASE WITHOUT ESOPHAGITIS: ICD-10-CM

## 2024-02-05 DIAGNOSIS — R06.09 EXERTIONAL DYSPNEA: ICD-10-CM

## 2024-02-05 LAB
ALBUMIN SERPL-MCNC: 4.5 G/DL (ref 3.2–4.8)
ALBUMIN/GLOB SERPL: 1.4 {RATIO} (ref 1–2)
ALP LIVER SERPL-CCNC: 67 U/L
ALT SERPL-CCNC: 35 U/L
ANION GAP SERPL CALC-SCNC: 2 MMOL/L (ref 0–18)
AST SERPL-CCNC: 24 U/L (ref ?–34)
BASOPHILS # BLD AUTO: 0.02 X10(3) UL (ref 0–0.2)
BASOPHILS NFR BLD AUTO: 0.3 %
BILIRUB SERPL-MCNC: 0.5 MG/DL (ref 0.3–1.2)
BUN BLD-MCNC: 13 MG/DL (ref 9–23)
BUN/CREAT SERPL: 10.9 (ref 10–20)
CALCIUM BLD-MCNC: 9.4 MG/DL (ref 8.7–10.4)
CHLORIDE SERPL-SCNC: 106 MMOL/L (ref 98–112)
CHOLEST SERPL-MCNC: 191 MG/DL (ref ?–200)
CO2 SERPL-SCNC: 32 MMOL/L (ref 21–32)
COMPLEXED PSA SERPL-MCNC: 1.99 NG/ML (ref ?–4)
CREAT BLD-MCNC: 1.19 MG/DL
DEPRECATED RDW RBC AUTO: 40 FL (ref 35.1–46.3)
EGFRCR SERPLBLD CKD-EPI 2021: 73 ML/MIN/1.73M2 (ref 60–?)
EOSINOPHIL # BLD AUTO: 0.13 X10(3) UL (ref 0–0.7)
EOSINOPHIL NFR BLD AUTO: 2.3 %
ERYTHROCYTE [DISTWIDTH] IN BLOOD BY AUTOMATED COUNT: 12.4 % (ref 11–15)
EST. AVERAGE GLUCOSE BLD GHB EST-MCNC: 111 MG/DL (ref 68–126)
FASTING PATIENT LIPID ANSWER: YES
FASTING STATUS PATIENT QL REPORTED: YES
GLOBULIN PLAS-MCNC: 3.2 G/DL (ref 2.8–4.4)
GLUCOSE BLD-MCNC: 101 MG/DL (ref 70–99)
HBA1C MFR BLD: 5.5 % (ref ?–5.7)
HCT VFR BLD AUTO: 45.3 %
HDLC SERPL-MCNC: 39 MG/DL (ref 40–59)
HGB BLD-MCNC: 15 G/DL
IMM GRANULOCYTES # BLD AUTO: 0.01 X10(3) UL (ref 0–1)
IMM GRANULOCYTES NFR BLD: 0.2 %
LDLC SERPL CALC-MCNC: 100 MG/DL (ref ?–100)
LYMPHOCYTES # BLD AUTO: 1.41 X10(3) UL (ref 1–4)
LYMPHOCYTES NFR BLD AUTO: 24.6 %
MCH RBC QN AUTO: 29 PG (ref 26–34)
MCHC RBC AUTO-ENTMCNC: 33.1 G/DL (ref 31–37)
MCV RBC AUTO: 87.5 FL
MONOCYTES # BLD AUTO: 0.42 X10(3) UL (ref 0.1–1)
MONOCYTES NFR BLD AUTO: 7.3 %
NEUTROPHILS # BLD AUTO: 3.75 X10 (3) UL (ref 1.5–7.7)
NEUTROPHILS # BLD AUTO: 3.75 X10(3) UL (ref 1.5–7.7)
NEUTROPHILS NFR BLD AUTO: 65.3 %
NONHDLC SERPL-MCNC: 152 MG/DL (ref ?–130)
OSMOLALITY SERPL CALC.SUM OF ELEC: 290 MOSM/KG (ref 275–295)
PLATELET # BLD AUTO: 214 10(3)UL (ref 150–450)
POTASSIUM SERPL-SCNC: 3.6 MMOL/L (ref 3.5–5.1)
PROT SERPL-MCNC: 7.7 G/DL (ref 5.7–8.2)
RBC # BLD AUTO: 5.18 X10(6)UL
SODIUM SERPL-SCNC: 140 MMOL/L (ref 136–145)
TRIGL SERPL-MCNC: 310 MG/DL (ref 30–149)
TSI SER-ACNC: 2.53 MIU/ML (ref 0.55–4.78)
VLDLC SERPL CALC-MCNC: 52 MG/DL (ref 0–30)
WBC # BLD AUTO: 5.7 X10(3) UL (ref 4–11)

## 2024-02-05 PROCEDURE — 3008F BODY MASS INDEX DOCD: CPT | Performed by: INTERNAL MEDICINE

## 2024-02-05 PROCEDURE — 93005 ELECTROCARDIOGRAM TRACING: CPT

## 2024-02-05 PROCEDURE — 85025 COMPLETE CBC W/AUTO DIFF WBC: CPT

## 2024-02-05 PROCEDURE — 99396 PREV VISIT EST AGE 40-64: CPT | Performed by: INTERNAL MEDICINE

## 2024-02-05 PROCEDURE — 83036 HEMOGLOBIN GLYCOSYLATED A1C: CPT

## 2024-02-05 PROCEDURE — 80061 LIPID PANEL: CPT

## 2024-02-05 PROCEDURE — 90677 PCV20 VACCINE IM: CPT | Performed by: INTERNAL MEDICINE

## 2024-02-05 PROCEDURE — 36415 COLL VENOUS BLD VENIPUNCTURE: CPT

## 2024-02-05 PROCEDURE — 93010 ELECTROCARDIOGRAM REPORT: CPT | Performed by: STUDENT IN AN ORGANIZED HEALTH CARE EDUCATION/TRAINING PROGRAM

## 2024-02-05 PROCEDURE — 90471 IMMUNIZATION ADMIN: CPT | Performed by: INTERNAL MEDICINE

## 2024-02-05 PROCEDURE — 84443 ASSAY THYROID STIM HORMONE: CPT

## 2024-02-05 PROCEDURE — 3074F SYST BP LT 130 MM HG: CPT | Performed by: INTERNAL MEDICINE

## 2024-02-05 PROCEDURE — 3079F DIAST BP 80-89 MM HG: CPT | Performed by: INTERNAL MEDICINE

## 2024-02-05 PROCEDURE — 80053 COMPREHEN METABOLIC PANEL: CPT

## 2024-02-05 NOTE — PROGRESS NOTES
Subjective:     Patient ID: Terry Dong is a 53 year old male.    Patient presents today for his annual physical.         History/Other:   Review of Systems   Constitutional: Negative.    HENT:  Positive for congestion and tinnitus.    Eyes: Negative.    Respiratory: Negative.           No hemoptysis; occasional exertional dyspnea noted but not all the time    Cardiovascular: Negative.  Negative for chest pain, palpitations and leg swelling.        No pnd   Gastrointestinal: Negative.    Genitourinary: Negative.         No nocturia   Allergic/Immunologic: Positive for environmental allergies. Negative for food allergies and immunocompromised state.   Hematological: Negative.      Current Outpatient Medications   Medication Sig Dispense Refill    losartan-hydroCHLOROthiazide 50-12.5 MG Oral Tab Take 1 tablet by mouth daily. 90 tablet 1    FLUoxetine 10 MG Oral Cap Take 1 capsule (10 mg total) by mouth daily. 90 capsule 1    CLARITIN-D 12 HOUR 5-120 MG Oral Tablet 12 Hr Take 1 tablet by mouth 2 (two) times daily. 180 tablet 1    Fluticasone Propionate 50 MCG/ACT Nasal Suspension 2 sprays by Nasal route daily. 1 Bottle 2    ESOMEPRAZOLE MAGNESIUM 40 MG Oral Capsule Delayed Release TAKE 1 CAPSULE (40 MG TOTAL) BY MOUTH DAILY. 90 capsule 0    fluticasone propionate 110 MCG/ACT Inhalation Aerosol Inhale 2 puffs into the lungs 2 (two) times daily. 1 each 0    Spacer/Aero-Holding Chambers Does not apply Device Use with albuterol inhaler 1 each 0    Omega-3 Fatty Acids (FISH OIL BURP-LESS OR) Take 1 capsule by mouth daily.      Albuterol Sulfate HFA (PROAIR HFA) 108 (90 Base) MCG/ACT Inhalation Aero Soln Inhale 2 puffs into the lungs every 6 (six) hours as needed for Wheezing or Shortness of Breath (or frequent coughing). 1 Inhaler 6    EPINEPHrine 0.3 MG/0.3ML Injection Solution Auto-injector        Allergies:  Allergies   Allergen Reactions    Bee ANAPHYLAXIS     Other reaction(s): Anaphylaxis, throat clouser    Beeswax  ANAPHYLAXIS     Other reaction(s): Anaphylaxis,throat  closed    Seasonal Runny nose     Sneezing, sinus congestion       Past Medical History:   Diagnosis Date    Allergic rhinitis     Anxiety     Blepharitis of both eyes 2010    OU; per NG    Essential hypertension     GERD (gastroesophageal reflux disease) 2000    PPI; per NG    History of pneumonia     per NG    History of snoring     per NG    Hx of bee sting allergy 1978    Epi-pen; per NG    Hx of hiatal hernia 2005    per NG    Myopia of both eyes with astigmatism 2010    OU; per NG    Prostatitis     per NG    Sinus problem     Sinus problems; per NG    Sleep apnea     Superficial punctate keratitis 2010    OS; per NG      Past Surgical History:   Procedure Laterality Date    COLONOSCOPY  10/19/2015    internal hemorrhoids only    EGD      ELECTROCARDIOGRAM, COMPLETE  01-    SCANNED TO MEDIA TAB: 01-    EXCISION TURBINATE,SUBMUCOUS  04/12/2018    EXTRACTION ERUPTED TOOTH/EXR  2014    Impacted wisdom tooth: Extraction; per NG    NASAL SCOPY,REMV PART ETHMOID  04/12/2018    NASAL SCOPY,RMV TISS MAXILL SINUS Left 04/12/2018    REPAIR OF NASAL SEPTUM  04/12/2018    UPPER GI ENDOSCOPY PERFORMED        Family History   Problem Relation Age of Onset    Diabetes Father 67        per NG    Obesity Father         per NG    Other (cardiac MI) Father     Hypertension Mother         per NG    Obesity Mother         per NG    Thyroid Disorder Mother         per NG    Heart Disorder Mother         Cardiac stent    Heart Disease Paternal Grandfather 70        CAD, Cause of death; per NG    Other (Other) Paternal Grandfather     Obesity Sister         per NG    Heart Disease Sister 43        Congestive heart disease; per NG    Other (Other) Maternal Grandmother     Other (Other) Maternal Grandfather     Other (Other) Paternal Grandmother     No Known Problems Brother       Social History:   Social History     Socioeconomic History    Marital status:     Tobacco Use    Smoking status: Never     Passive exposure: Never    Smokeless tobacco: Never    Tobacco comments:     per NG   Vaping Use    Vaping Use: Never used   Substance and Sexual Activity    Alcohol use: Yes     Alcohol/week: 0.0 standard drinks of alcohol     Comment: socially - 2 beers/week    Drug use: No   Other Topics Concern    Caffeine Concern Yes     Comment: soda, 1 coke a day    Exercise Yes        Objective:   Physical Exam  Constitutional:       General: He is not in acute distress.     Appearance: He is well-developed. He is not ill-appearing, toxic-appearing or diaphoretic.   HENT:      Head: Normocephalic and atraumatic.      Right Ear: Tympanic membrane, ear canal and external ear normal.      Left Ear: Tympanic membrane, ear canal and external ear normal.      Nose: Nose normal.      Mouth/Throat:      Pharynx: No oropharyngeal exudate.   Eyes:      General:         Right eye: No discharge.         Left eye: No discharge.      Conjunctiva/sclera: Conjunctivae normal.      Pupils: Pupils are equal, round, and reactive to light.   Neck:      Vascular: No JVD.   Cardiovascular:      Rate and Rhythm: Normal rate and regular rhythm.      Pulses: Normal pulses.      Heart sounds: Normal heart sounds. No murmur heard.     No friction rub. No gallop.   Pulmonary:      Effort: Pulmonary effort is normal. No respiratory distress.      Breath sounds: Normal breath sounds. No wheezing or rales.   Abdominal:      General: Bowel sounds are normal. There is no distension.      Palpations: Abdomen is soft. There is no mass.      Tenderness: There is no abdominal tenderness. There is no guarding or rebound.   Genitourinary:     Prostate: Normal. Not enlarged, not tender and no nodules present.      Rectum: Normal. No mass, tenderness, anal fissure or external hemorrhoid. Normal anal tone.   Musculoskeletal:         General: No tenderness. Normal range of motion.      Cervical back: Normal range of motion  and neck supple. No rigidity or tenderness.      Right lower leg: No edema.      Left lower leg: No edema.   Lymphadenopathy:      Cervical: No cervical adenopathy.   Skin:     General: Skin is warm and dry.      Coloration: Skin is not jaundiced or pale.      Findings: No rash.   Neurological:      Mental Status: He is alert and oriented to person, place, and time.         Assessment & Plan:   (Z00.00) Annual physical exam  (primary encounter diagnosis)  Plan: CBC With Differential With Platelet, Comp         Metabolic Panel (14), Hemoglobin A1C, Lipid         Panel, PSA Total, Screen, TSH W Reflex To Free         T4, EKG 12 Lead to be performed at [facility]        Routine labs been ordered.  Patient given Prevnar 20 today.  He already got his flu shot as well as his latest COVID booster.  He will come back for Shingrix vaccine.    (I10) Essential hypertension  Plan: Blood pressure still controlled with current blood pressure meds.  CPM.    (R73.01) IFG (impaired fasting glucose)  Plan: Check his fasting glucose and A1c.    (K21.9) Gastroesophageal reflux disease without esophagitis  Plan: Stable on PPI therapy.    (G47.33) MICHELLE (obstructive sleep apnea)  Plan: ENT Referral - In Network, Pulmonary Referral -        In Network        Is using a dental appliance, he is wanting to explore possibility of doing inspire so we will refer him to ENT.    (J30.9) Allergic rhinitis, unspecified seasonality, unspecified trigger  Plan: Continue with her allergy medications.  CPM.    (E78.00) Hypercholesteremia  Plan: Check his lipid panel.  Follow low-fat low-cholesterol diet.    (Z12.5) Prostate cancer screening  Plan: Will check his PSA.  (Z12.11) Colon cancer screening  Plan: He is up-to-date with his colonoscopy.    (H93.13) Tinnitus of both ears  Plan: ENT Referral - In Network        Chronic tinnitus.  He will follow-up with ENT.    (R06.09) Exertional dyspnea  Plan: CARD NUC EXERCISE STRESS  (REST/EXER)         (CPT=78452)        We did complain of exertional dyspnea I told him would be best for him to get a stress test.  Will also do an EKG today.       No orders of the defined types were placed in this encounter.      Meds This Visit:  Requested Prescriptions      No prescriptions requested or ordered in this encounter       Imaging & Referrals:  None

## 2024-02-06 LAB
ATRIAL RATE: 67 BPM
P AXIS: 58 DEGREES
P-R INTERVAL: 162 MS
Q-T INTERVAL: 392 MS
QRS DURATION: 98 MS
QTC CALCULATION (BEZET): 414 MS
R AXIS: 17 DEGREES
T AXIS: 19 DEGREES
VENTRICULAR RATE: 67 BPM

## 2024-02-12 ENCOUNTER — NURSE ONLY (OUTPATIENT)
Dept: INTERNAL MEDICINE CLINIC | Facility: CLINIC | Age: 54
End: 2024-02-12

## 2024-02-12 DIAGNOSIS — Z23 IMMUNIZATION DUE: Primary | ICD-10-CM

## 2024-02-12 PROCEDURE — 90750 HZV VACC RECOMBINANT IM: CPT | Performed by: INTERNAL MEDICINE

## 2024-02-12 PROCEDURE — 90471 IMMUNIZATION ADMIN: CPT | Performed by: INTERNAL MEDICINE

## 2024-02-13 ENCOUNTER — OFFICE VISIT (OUTPATIENT)
Dept: AUDIOLOGY | Facility: CLINIC | Age: 54
End: 2024-02-13

## 2024-02-13 ENCOUNTER — OFFICE VISIT (OUTPATIENT)
Dept: OTOLARYNGOLOGY | Facility: CLINIC | Age: 54
End: 2024-02-13
Payer: COMMERCIAL

## 2024-02-13 DIAGNOSIS — H93.19 TINNITUS, UNSPECIFIED LATERALITY: Primary | ICD-10-CM

## 2024-02-13 DIAGNOSIS — G47.33 OSA (OBSTRUCTIVE SLEEP APNEA): ICD-10-CM

## 2024-02-13 PROCEDURE — 92567 TYMPANOMETRY: CPT | Performed by: AUDIOLOGIST

## 2024-02-13 PROCEDURE — 99214 OFFICE O/P EST MOD 30 MIN: CPT | Performed by: OTOLARYNGOLOGY

## 2024-02-13 PROCEDURE — 92557 COMPREHENSIVE HEARING TEST: CPT | Performed by: AUDIOLOGIST

## 2024-02-13 NOTE — PROGRESS NOTES
Terry Dong is a 53 year old male.    Chief Complaint   Patient presents with    Ringing In Ear     Bilateral ringing in ears    Snoring     Inspire consultation        HISTORY OF PRESENT ILLNESS  Rashid the long history of tinnitus.  States that typically it comes and goes more recently he has noted that it seems to be increasing see and sometimes seems very loud to him.  He does note relationship between possible hypertension jaw grinding clenching behavior as well as stress and anxiety.  He does note that the stress anxiety issue seems to be very correlated to the onset of his tinnitus.  Tinnitus seems to disappear when he goes to sleep and interestingly it actually is dissipating by the time he goes to sleep so when he is in a quiet setting it does seem to bother him.  Always seems worse when he is doing something during the day that is very stressful.  Can go away for several days but then it returns and can be very problematic.  Previous audiogram performed several years ago demonstrates no significant changes with at most a very slight drop in hearing at the highest frequencies but still within normal limits.  This is only on the right.  Normal tympanograms normal speech discrimination scores.      Social History     Socioeconomic History    Marital status:    Tobacco Use    Smoking status: Never     Passive exposure: Never    Smokeless tobacco: Never    Tobacco comments:     per NG   Vaping Use    Vaping Use: Never used   Substance and Sexual Activity    Alcohol use: Yes     Alcohol/week: 0.0 standard drinks of alcohol     Comment: socially - 2 beers/week    Drug use: No   Other Topics Concern    Caffeine Concern Yes     Comment: soda, 1 coke a day    Exercise Yes       Family History   Problem Relation Age of Onset    Heart Disorder Father     Diabetes Father 67        per NG    Obesity Father         per NG    Other (cardiac MI) Father     Hypertension Mother         per NG    Obesity Mother          per NG    Thyroid Disorder Mother         per NG    Heart Disorder Mother         Cardiac stent at 71 y/o    Other (Other) Maternal Grandmother     Other (Other) Maternal Grandfather     Other (Other) Paternal Grandmother     Heart Disease Paternal Grandfather 70        CAD, Cause of death; per NG    Other (Other) Paternal Grandfather     Heart Disorder Sister         bicuspid aortic valve    Obesity Sister         per NG    Heart Disease Sister 43        Congestive heart disease; per NG    No Known Problems Brother        Past Medical History:   Diagnosis Date    Allergic rhinitis     Anxiety     Blepharitis of both eyes 2010    OU; per NG    Essential hypertension     GERD (gastroesophageal reflux disease) 2000    PPI; per NG    History of pneumonia     per NG    History of snoring     per NG    Hx of bee sting allergy 1978    Epi-pen; per NG    Hx of hiatal hernia 2005    per NG    Myopia of both eyes with astigmatism 2010    OU; per NG    Prostatitis     per NG    Sinus problem     Sinus problems; per NG    Sleep apnea     Superficial punctate keratitis 2010    OS; per NG       Past Surgical History:   Procedure Laterality Date    COLONOSCOPY  10/19/2015    internal hemorrhoids only    EGD      ELECTROCARDIOGRAM, COMPLETE  01-    SCANNED TO MEDIA TAB: 01-    EXCISION TURBINATE,SUBMUCOUS  04/12/2018    EXTRACTION ERUPTED TOOTH/EXR  2014    Impacted wisdom tooth: Extraction; per NG    NASAL SCOPY,REMV PART ETHMOID  04/12/2018    NASAL SCOPY,RMV TISS MAXILL SINUS Left 04/12/2018    REPAIR OF NASAL SEPTUM  04/12/2018    UPPER GI ENDOSCOPY PERFORMED           REVIEW OF SYSTEMS    System Neg/Pos Details   Constitutional Negative Fatigue, fever and weight loss.   ENMT Negative Drooling.   Eyes Negative Blurred vision and vision changes.   Respiratory Negative Dyspnea and wheezing.   Cardio Negative Chest pain, irregular heartbeat/palpitations and syncope.   GI Negative Abdominal pain and diarrhea.    Endocrine Negative Cold intolerance and heat intolerance.   Neuro Negative Tremors.   Psych Negative Anxiety and depression.   Integumentary Negative Frequent skin infections, pigment change and rash.   Hema/Lymph Negative Easy bleeding and easy bruising.           PHYSICAL EXAM    There were no vitals taken for this visit.       Constitutional Normal Overall appearance - Normal.   Psychiatric Normal Orientation - Oriented to time, place, person & situation. Appropriate mood and affect.   Neck Exam Normal Inspection - Normal. Palpation - Normal. Parotid gland - Normal. Thyroid gland - Normal.   Eyes Normal Conjunctiva - Right: Normal, Left: Normal. Pupil - Right: Normal, Left: Normal. Fundus - Right: Normal, Left: Normal.   Neurological Normal Memory - Normal. Cranial nerves - Cranial nerves II through XII grossly intact.   Head/Face Normal Facial features - Normal. Eyebrows - Normal. Skull - Normal.        Nasopharynx Normal External nose - Normal. Lips/teeth/gums - Normal. Tonsils - Normal. Oropharynx - Normal.   Ears Normal Inspection - Right: Normal, Left: Normal. Canal - Right: Normal, Left: Normal. TM - Right: Normal, Left: Normal.   Skin Normal Inspection - Normal.        Lymph Detail Normal Submental. Submandibular. Anterior cervical. Posterior cervical. Supraclavicular.   TMJ  Tenderness to palpation right greater than left   Nose/Mouth/Throat Normal External nose - Normal. Lips/teeth/gums - Normal. Tonsils - Normal. Oropharynx - Normal.   Nose/Mouth/Throat Normal Nares - Right: Normal Left: Normal. Septum -Normal  Turbinates - Right: Normal, Left: Normal.       Current Outpatient Medications:     losartan-hydroCHLOROthiazide 50-12.5 MG Oral Tab, Take 1 tablet by mouth daily., Disp: 90 tablet, Rfl: 1    FLUoxetine 10 MG Oral Cap, Take 1 capsule (10 mg total) by mouth daily., Disp: 90 capsule, Rfl: 1    CLARITIN-D 12 HOUR 5-120 MG Oral Tablet 12 Hr, Take 1 tablet by mouth 2 (two) times daily., Disp: 180  tablet, Rfl: 1    Fluticasone Propionate 50 MCG/ACT Nasal Suspension, 2 sprays by Nasal route daily., Disp: 1 Bottle, Rfl: 2    ESOMEPRAZOLE MAGNESIUM 40 MG Oral Capsule Delayed Release, TAKE 1 CAPSULE (40 MG TOTAL) BY MOUTH DAILY., Disp: 90 capsule, Rfl: 0    fluticasone propionate 110 MCG/ACT Inhalation Aerosol, Inhale 2 puffs into the lungs 2 (two) times daily., Disp: 1 each, Rfl: 0    Spacer/Aero-Holding Chambers Does not apply Device, Use with albuterol inhaler, Disp: 1 each, Rfl: 0    Omega-3 Fatty Acids (FISH OIL BURP-LESS OR), Take 1 capsule by mouth daily., Disp: , Rfl:     Albuterol Sulfate HFA (PROAIR HFA) 108 (90 Base) MCG/ACT Inhalation Aero Soln, Inhale 2 puffs into the lungs every 6 (six) hours as needed for Wheezing or Shortness of Breath (or frequent coughing)., Disp: 1 Inhaler, Rfl: 6    EPINEPHrine 0.3 MG/0.3ML Injection Solution Auto-injector, , Disp: , Rfl:   ASSESSMENT AND PLAN    1. Tinnitus, unspecified laterality  Long detailed conversation regarding the etiologies of tinnitus.  He does have episodes of high blood pressure he does have episodes where he thinks he clenches or grinds he does have episodes where he has significant stress anxiety and all seem to be related to the onset of his tinnitus.  We discussed relaxation techniques and managing his stress anxiety to try limits the tinnitus in his life.  Audiogram performed today demonstrates essentially normal hearing at all frequencies and at most perhaps a slight drop in the right ear but still within normal limits.  I did recommend repeating a hearing test in 2 to 3 years to watch for interval stability in his hearing  - Audiology Referral - [place] ([facility])    2. MICHELLE (obstructive sleep apnea)  Regarding his obstructive sleep apnea currently using an oral appliance which is causing him issues with his jaw with TMJ and worsening ringing.  He does have some tenderness of the TMJ bilaterally on palpation.  He is interested in  looking into Inspire as he does not tolerate CPAP otherwise.  I did give him the name of Dr. Fede Silva at [facility] to see if he would be a candidate for placement of an inspire device.  Return to see me as needed.  Greater than 30 minutes spent with patient discussions regarding his sleep apnea and tinnitus issues.  - Specialty Other Referral - External        This note was prepared using Dragon [employer] voice recognition dictation software. As a result errors may occur. When identified these errors have been corrected. While every attempt is made to correct errors during dictation discrepancies may still exist    Terry Villatoro MD    2/13/2024    9:59 AM

## 2024-02-22 ENCOUNTER — HOSPITAL ENCOUNTER (OUTPATIENT)
Dept: NUCLEAR MEDICINE | Facility: HOSPITAL | Age: 54
Discharge: HOME | End: 2024-02-22
Attending: INTERNAL MEDICINE
Payer: COMMERCIAL

## 2024-02-22 ENCOUNTER — HOSPITAL ENCOUNTER (OUTPATIENT)
Dept: CV DIAGNOSTICS | Facility: HOSPITAL | Age: 54
Discharge: HOME | End: 2024-02-22
Attending: INTERNAL MEDICINE
Payer: COMMERCIAL

## 2024-02-22 DIAGNOSIS — R06.09 EXERTIONAL DYSPNEA: ICD-10-CM

## 2024-02-22 LAB
% OF MAX PREDICTED HR: 100 %
MAX DIASTOLIC BP: 90 MMHG
MAX HEART RATE: 179 BPM
MAX PREDICTED HEART RATE: 168 BPM
MAX SYSTOLIC BP: 188 MMHG
MAX WORK LOAD: 134

## 2024-02-22 PROCEDURE — 93016 CV STRESS TEST SUPVJ ONLY: CPT | Performed by: INTERNAL MEDICINE

## 2024-02-22 PROCEDURE — 93018 CV STRESS TEST I&R ONLY: CPT | Performed by: INTERNAL MEDICINE

## 2024-02-22 PROCEDURE — 78452 HT MUSCLE IMAGE SPECT MULT: CPT | Performed by: INTERNAL MEDICINE

## 2024-02-22 PROCEDURE — 93017 CV STRESS TEST TRACING ONLY: CPT | Performed by: INTERNAL MEDICINE

## 2024-02-22 RX ORDER — FLUOXETINE 10 MG/1
10 CAPSULE ORAL DAILY
Qty: 90 CAPSULE | Refills: 3 | Status: SHIPPED | OUTPATIENT
Start: 2024-02-22

## 2024-02-22 NOTE — TELEPHONE ENCOUNTER
Refill passed per [facility] protocol.   Requested Prescriptions   Pending Prescriptions Disp Refills    FLUOXETINE 10 MG Oral Cap [Pharmacy Med Name: FLUOXETINE HCL 10 MG CAPSULE] 90 capsule 1     Sig: TAKE 1 CAPSULE BY MOUTH EVERY DAY       Psychiatric Non-Scheduled (Anti-Anxiety) Passed - 2/20/2024  1:07 AM        Passed - In person appointment or virtual visit in the past 6 mos or appointment in next 3 mos     Recent [facility]              [facility], [facility]    [facility]    1 week ago T[place], unspecified laterality    [facility] [place]    Office Visit    1 week ago Immunization due    [facility] [place]    Nurse Only    2 weeks ago Annual physical exam    [facility] Anthony Plummer MD    Office Visit    7 months ago     [facility]    Office Visit                      Passed - Depression Screening completed within the past 12 months            Recent Outpatient Visits              1 week ago Tinnitus, unspecified laterality    [facility] Aneta Swan Au.D    Office Visit    1 week ago Tinnitus, unspecified laterality    [facility] [place]    Office Visit    1 week ago Immunization due    [facility] [place]    Nurse Only    2 weeks ago [facility] physical exam    [facility]    [facility] Visit    7 months ago     [facility]    Office Visit

## 2024-03-13 DIAGNOSIS — J30.9 ALLERGIC RHINITIS, UNSPECIFIED SEASONALITY, UNSPECIFIED TRIGGER: Primary | ICD-10-CM

## 2024-03-14 NOTE — TELEPHONE ENCOUNTER
Please review; protocol failed/ has no protocol    Requested Prescriptions   Pending Prescriptions Disp Refills    [employer] ALLERGY RELIEF-D12 5-120 MG Oral Tablet 12 Hr [Pharmacy Med Name: [employer] ALLERGY RELIEF-D12 TABLET] 140 tablet 0     Sig: TAKE ONE TABLET BY MOUTH TWO TIMES DAILY       There is no refill protocol information for this order        Recent Outpatient Visits              1 month ago Tinnitus, unspecified laterality    [facility] [place] Aneta Orozco Au.D    Office Visit    1 month ago Tinnitus, unspecified laterality    [facility] [place]    Office Visit    1 month ago Immunization due    [facility] [place]    Nurse Only    1 month ago Annual physical exam    [facility] [place] Anthony Nesbitt MD    Office Visit    8 months ago     [facility]    Office Visit

## 2024-03-15 RX ORDER — LORATADINE/PSEUDOEPHEDRINE 5 MG-120MG
1 TABLET, EXTENDED RELEASE 12 HR ORAL 2 TIMES DAILY
Qty: 180 TABLET | Refills: 1 | Status: SHIPPED | OUTPATIENT
Start: 2024-03-15

## 2024-03-27 ENCOUNTER — MED REC SCAN ONLY (OUTPATIENT)
Dept: INTERNAL MEDICINE CLINIC | Facility: CLINIC | Age: 54
End: 2024-03-27

## 2024-04-02 ENCOUNTER — HOSPITAL ENCOUNTER (OUTPATIENT)
Age: 54
Discharge: HOME | End: 2024-04-02
Payer: COMMERCIAL

## 2024-04-02 ENCOUNTER — APPOINTMENT (OUTPATIENT)
Dept: GENERAL RADIOLOGY | Age: 54
End: 2024-04-02
Attending: Physician Assistant
Payer: COMMERCIAL

## 2024-04-02 VITALS
OXYGEN SATURATION: 99 % | RESPIRATION RATE: 18 BRPM | DIASTOLIC BLOOD PRESSURE: 89 MMHG | SYSTOLIC BLOOD PRESSURE: 139 MMHG | TEMPERATURE: 98 F | HEART RATE: 85 BPM

## 2024-04-02 DIAGNOSIS — S93.601A SPRAIN OF RIGHT FOOT, INITIAL ENCOUNTER: Primary | ICD-10-CM

## 2024-04-02 PROCEDURE — 99213 OFFICE O/P EST LOW 20 MIN: CPT | Performed by: PHYSICIAN ASSISTANT

## 2024-04-02 PROCEDURE — 73630 X-RAY EXAM OF FOOT: CPT | Performed by: PHYSICIAN ASSISTANT

## 2024-04-02 RX ORDER — NAPROXEN 500 MG/1
500 TABLET ORAL 2 TIMES DAILY PRN
Qty: 20 TABLET | Refills: 0 | Status: SHIPPED | OUTPATIENT
Start: 2024-04-02 | End: 2024-04-09

## 2024-04-02 NOTE — ED QUICK NOTES
Pt requested to just take home the supply and he will apply it only while he is home, provider is aware

## 2024-04-02 NOTE — ED PROVIDER NOTES
Chief Complaint   Patient presents with    Foot Injury       History obtained from: patient   services not used     HPI:     Terry Dong is a 53 year old male who presents with right foot pain x 2-3 days. Patient localizes pain to top of right foot and states pain is worse with bearing weight and ambulating. Patient states he was in [place] last week and did a lot of walking. Patient took ibuprofen for pain without significant relief. Denies injury/trauma, falls, numbness, weakness, swelling, wound, change in skin color/temperature.     PMH  Past Medical History:   Diagnosis Date    Allergic rhinitis     Anxiety     Blepharitis of both eyes 2010    OU; per NG    Essential hypertension     GERD (gastroesophageal reflux disease) 2000    PPI; per NG    History of pneumonia     per NG    History of snoring     per NG    Hx of bee sting allergy 1978    Epi-pen; per NG    Hx of hiatal hernia 2005    per NG    Myopia of both eyes with astigmatism 2010    OU; per NG    Prostatitis     per NG    Sinus problem     Sinus problems; per NG    Sleep apnea     Superficial punctate keratitis 2010    OS; per NG       PFSH    PFSH asessment screens reviewed and agree.  Nurses notes reviewed I agree with documentation.    Family History   Problem Relation Age of Onset    Heart Disorder Father     Diabetes Father 67        per NG    Obesity Father         per NG    Other (cardiac MI) Father     Hypertension Mother         per NG    Obesity Mother         per NG    Thyroid Disorder Mother         per NG    Heart Disorder Mother         Cardiac stent at 71 y/o    Other (Other) Maternal Grandmother     Other (Other) Maternal Grandfather     Other (Other) Paternal Grandmother     Heart Disease Paternal Grandfather 70        CAD, Cause of death; per NG    Other (Other) Paternal Grandfather     Heart Disorder Sister         bicuspid aortic valve    Obesity Sister         per NG    Heart Disease Sister 43        Congestive  heart disease; per NG    No Known Problems Brother      Family history reviewed with patient/caregiver and is not pertinent to presenting problem.  Social History     Socioeconomic History    Marital status:      Spouse name: Not on file    Number of children: Not on file    Years of education: Not on file    Highest education level: Not on file   Occupational History    Not on file   Tobacco Use    Smoking status: Never     Passive exposure: Never    Smokeless tobacco: Never    Tobacco comments:     per NG   Vaping Use    Vaping Use: Never used   Substance and Sexual Activity    Alcohol use: Yes     Alcohol/week: 0.0 standard drinks of alcohol     Comment: socially - 2 beers/week    Drug use: No    Sexual activity: Not on file   Other Topics Concern     Service Not Asked    Blood Transfusions Not Asked    Caffeine Concern Yes     Comment: soda, 1 coke a day    Occupational Exposure Not Asked    Hobby Hazards Not Asked    Sleep Concern Not Asked    Stress Concern Not Asked    Weight Concern Not Asked    Special Diet Not Asked    Back Care Not Asked    Exercise Yes    Bike Helmet Not Asked    Seat Belt Not Asked    Self-Exams Not Asked   Social History Narrative    Not on file     Social Determinants of Health     Financial Resource Strain: Not on file   Food Insecurity: Not on file   Transportation Needs: Not on file   Physical Activity: Not on file   Stress: Not on file   Social Connections: Not on file   Housing Stability: Not on file         ROS:   Positive for stated complaint: right foot pain   All other systems reviewed and negative except as noted above.  Constitutional and Vital Signs Reviewed.    Physical Exam:     Findings:    /89   Pulse 85   Temp 97.6 °F (36.4 °C) (Temporal)   Resp 18   SpO2 99%   GENERAL: well developed, no acute distress, non-toxic appearing   SKIN: good skin turgor, no obvious rashes  HEAD: normocephalic, atraumatic  EYES: sclera non-icteric bilaterally,  conjunctiva clear bilaterally  OROPHARYNX: MMM, maintaining airway and secretions  NECK: no nuchal rigidity, no trismus, no edema, phonation normal    CARDIO: regular rate, DP pulse 2+ bilaterally, cap refill < 2 sec   LUNGS: no increased WOB  EXTREMITIES: Tenderness to dorsal right midfoot, no obvious swelling or deformity, F ROM, compartments soft, CMS intact, skin intact without overlying changes, no tenderness to ankle or calf  NEURO: no focal deficits  PSYCH: alert and oriented x3, answering questions appropriately, mood appropriate    MDM/Assessment/Plan:   Orders for this encounter:    Orders Placed This Encounter    XR FOOT, COMPLETE (MIN 3 VIEWS), RIGHT (CPT=73630)     Order Specific Question:   What is the Relevant Clinical Indication / Reason for Exam?     Answer:   R Foot Pain     Order Specific Question:   Release to patient     Answer:   Immediate    Post-op shoe    Ace wrap     To affected area    naproxen 500 MG Oral Tab     Sig: Take 1 tablet (500 mg total) by mouth 2 (two) times daily as needed.     Dispense:  20 tablet     Refill:  0       Labs performed this visit:  No results found for this or any previous visit (from the past 10 hour(s)).    Imaging performed this visit:  XR FOOT, COMPLETE (MIN 3 VIEWS), RIGHT (CPT=73630)   Final Result   PROCEDURE: XR FOOT, COMPLETE (MIN 3 VIEWS), RIGHT (CPT=73630)       COMPARISON: None.       INDICATIONS: Pain on dorsal aspect of right foot for 3 days.  No trauma.       TECHNIQUE: 3 views were obtained.         FINDINGS:    BONES: Mild enthesopathy of the Achilles tendon insertion.. No significant    arthropathy, fracture or acute abnormality.   SOFT TISSUES: Negative. No visible soft tissue swelling.    EFFUSION: None visible.    OTHER: Negative.                    =====   CONCLUSION:    1. No acute appearing fracture or dislocation.  No significant    arthropathy.  Mild enthesopathy of the Achilles tendon insertion.               Dictated by (CST): Rashid  Selvin AKERS MD on 4/02/2024 at 8:32 AM        Finalized by (CST): Selvin Mike MD on 4/02/2024 at 8:33 AM                   Medical Decision Making  DDx includes sprain versus fracture versus contusion versus tendinitis versus other.  No signs of neurovascular compromise or compartment syndrome.  Given easily reproducible pain localized to dorsal midfoot, suspect musculoskeletal etiology.  X-ray reviewed, no evidence of acute fracture or dislocation, mild enthesopathy of the Achilles tendon insertion noted.  Discussed results with patient.  Ace wrap and postop shoe applied to right foot.  Rx naproxen as needed for pain.  Discussed supportive care including rest, ice, elevation, and OTC Tylenol as needed for pain.  Instructed patient to go directly to nearest ER with any worsening or concerning symptoms.  Follow-up with PCP and/or podiatry.    Amount and/or Complexity of Data Reviewed  Radiology: ordered and independent interpretation performed.    Risk  OTC drugs.  Prescription drug management.          Diagnosis:    ICD-10-CM    1. Sprain of right foot, initial encounter  S93.601A           All results reviewed and discussed with patient/patient's family. Patient/patient's family verbalize excellent understanding of instructions and feels comfortable with plan. All of patient's/patient's family's questions were addressed.   See AVS for detailed discharge instructions for your condition today.    Follow Up with:  Brianna Dupont DPM  [address]  [facility] [place] [address]  [phone]      Podiatry      Marcia Zamora PA-C

## 2024-04-02 NOTE — DISCHARGE INSTRUCTIONS
Rest, ice, and elevate foot   Naproxen as needed for pain up to 2 times daily   Drink plenty of fluids   Get plenty of rest   Avoid excessive twisting, bending, or lifting   Follow up with your primary care provider and/or podiatry

## 2024-04-08 RX ORDER — LOSARTAN POTASSIUM AND HYDROCHLOROTHIAZIDE 12.5; 5 MG/1; MG/1
1 TABLET ORAL DAILY
Qty: 90 TABLET | Refills: 3 | Status: SHIPPED | OUTPATIENT
Start: 2024-04-08

## 2024-04-08 NOTE — TELEPHONE ENCOUNTER
Refill Per Protocol     Requested Prescriptions   Pending Prescriptions Disp Refills    LOSARTAN-HYDROCHLOROTHIAZIDE 50-12.5 MG Oral Tab [Pharmacy Med Name: LOSARTAN-HCTZ 50-12.5 MG TAB] 90 tablet 1     Sig: TAKE 1 TABLET BY MOUTH EVERY DAY       Hypertension Medications Protocol Passed - 4/6/2024 12:56 AM        Passed - CMP or BMP in past 12 months        Passed - Last BP reading less than 140/90     BP Readings from Last 1 Encounters:   04/02/24 139/89               Passed - In person appointment or virtual visit in the past 12 mos or appointment in next 3 mos     Recent Outpatient Visits              1 month ago Tinnitus, unspecified laterality    [facility] Aneta Swan Au.D    Office Visit    1 month ago Tinnitus, unspecified laterality    [facility] Terry Toro MD    Office Visit    1 month ago Immunization due    [facility] [place]    Nurse Only    2 months ago [facility] physical exam    [facility] [place]    Office Visit    9 months ago     [facility]    Office Visit                      Passed - EGFRCR or GFRNAA > 50     GFR Evaluation  EGFRCR: 73 , resulted on 2/5/2024                   Recent Outpatient Visits              1 month ago Tinnitus, unspecified laterality    [facility] Aneta Swan Au.D    Office Visit    1 month ago Tinnitus, unspecified laterality    [facility] Terry Toro MD    Office Visit    1 month ago Immunization due    [facility] [place]    Nurse Only    2 months ago Annual physical exam    [facility] Anthony Plummer MD    Office Visit    9 months ago     [facility]    Office Visit

## 2024-06-19 ENCOUNTER — TELEPHONE (OUTPATIENT)
Dept: INTERNAL MEDICINE CLINIC | Facility: CLINIC | Age: 54
End: 2024-06-19

## 2024-06-19 ENCOUNTER — OFFICE VISIT (OUTPATIENT)
Dept: INTERNAL MEDICINE CLINIC | Facility: CLINIC | Age: 54
End: 2024-06-19
Payer: COMMERCIAL

## 2024-06-19 VITALS
BODY MASS INDEX: 29.16 KG/M2 | SYSTOLIC BLOOD PRESSURE: 128 MMHG | DIASTOLIC BLOOD PRESSURE: 80 MMHG | HEART RATE: 94 BPM | WEIGHT: 220 LBS | HEIGHT: 73 IN

## 2024-06-19 DIAGNOSIS — J30.9 ALLERGIC RHINITIS, UNSPECIFIED SEASONALITY, UNSPECIFIED TRIGGER: Primary | ICD-10-CM

## 2024-06-19 PROCEDURE — 3008F BODY MASS INDEX DOCD: CPT | Performed by: INTERNAL MEDICINE

## 2024-06-19 PROCEDURE — 3079F DIAST BP 80-89 MM HG: CPT | Performed by: INTERNAL MEDICINE

## 2024-06-19 PROCEDURE — 99214 OFFICE O/P EST MOD 30 MIN: CPT | Performed by: INTERNAL MEDICINE

## 2024-06-19 PROCEDURE — 3074F SYST BP LT 130 MM HG: CPT | Performed by: INTERNAL MEDICINE

## 2024-06-19 RX ORDER — BENZONATATE 200 MG/1
200 CAPSULE ORAL 3 TIMES DAILY PRN
Qty: 60 CAPSULE | Refills: 0 | Status: SHIPPED | OUTPATIENT
Start: 2024-06-19 | End: 2024-06-19

## 2024-06-19 RX ORDER — AZITHROMYCIN 250 MG/1
TABLET, FILM COATED ORAL
Qty: 6 TABLET | Refills: 0 | Status: SHIPPED | OUTPATIENT
Start: 2024-06-19 | End: 2024-06-23

## 2024-06-19 RX ORDER — METHYLPREDNISOLONE 4 MG/1
TABLET ORAL
Qty: 1 EACH | Refills: 0 | Status: SHIPPED | OUTPATIENT
Start: 2024-06-19

## 2024-06-19 RX ORDER — BENZONATATE 200 MG/1
200 CAPSULE ORAL 3 TIMES DAILY PRN
Qty: 60 CAPSULE | Refills: 0 | Status: SHIPPED | OUTPATIENT
Start: 2024-06-19

## 2024-06-19 RX ORDER — ALBUTEROL SULFATE 90 UG/1
2 AEROSOL, METERED RESPIRATORY (INHALATION) EVERY 4 HOURS PRN
Qty: 1 EACH | Refills: 0 | Status: SHIPPED | OUTPATIENT
Start: 2024-06-19 | End: 2025-06-19

## 2024-06-19 RX ORDER — FLUTICASONE PROPIONATE 110 UG/1
1 AEROSOL, METERED RESPIRATORY (INHALATION) 2 TIMES DAILY
Qty: 1 EACH | Refills: 0 | Status: SHIPPED | OUTPATIENT
Start: 2024-06-19 | End: 2024-06-19

## 2024-06-19 NOTE — PROGRESS NOTES
HPI:    Patient ID: Terry Dong is a 53 year old male.    HPI    Mild cold sx 10 days ago runny   Did not check for covid    /80 (BP Location: Right arm, Patient Position: Sitting, Cuff Size: adult)   Pulse 94   Ht 6' 1\" (1.854 m)   Wt 220 lb (99.8 kg)   BMI 29.03 kg/m²   Wt Readings from Last 6 Encounters:   06/19/24 220 lb (99.8 kg)   02/05/24 224 lb 9.6 oz (101.9 kg)   06/27/23 220 lb (99.8 kg)   05/31/23 218 lb 12.8 oz (99.2 kg)   05/05/23 220 lb (99.8 kg)   04/17/23 225 lb 6.4 oz (102.2 kg)     Body mass index is 29.03 kg/m².  HGBA1C:    Lab Results   Component Value Date    A1C 5.5 02/05/2024    A1C 5.7 (H) 11/08/2022     02/05/2024         Review of Systems   Constitutional:  Positive for chills. Negative for fever.   HENT:  Positive for congestion and rhinorrhea. Negative for sinus pressure and sore throat.    Respiratory:  Positive for cough and wheezing. Negative for shortness of breath.    Cardiovascular:  Negative for chest pain, palpitations and leg swelling.   Gastrointestinal:  Negative for abdominal pain, diarrhea, nausea and vomiting.   Genitourinary:  Negative for difficulty urinating.         Current Outpatient Medications   Medication Sig Dispense Refill    Omega-3 Fatty Acids (FISH OIL OR) Take by mouth.      methylPREDNISolone (MEDROL) 4 MG Oral Tablet Therapy Pack As directed. 1 each 0    albuterol (PROAIR HFA) 108 (90 Base) MCG/ACT Inhalation Aero Soln Inhale 2 puffs into the lungs every 4 (four) hours as needed for Wheezing. 1 each 0    benzonatate 200 MG Oral Cap Take 1 capsule (200 mg total) by mouth 3 (three) times daily as needed for cough. 60 capsule 0    losartan-hydroCHLOROthiazide 50-12.5 MG Oral Tab Take 1 tablet by mouth daily. 90 tablet 3    loratadine-pseudoephedrine ER ([employer] ALLERGY RELIEF-D12) 5-120 MG Oral Tablet 12 Hr Take 1 tablet by mouth 2 (two) times daily. 180 tablet 1    FLUoxetine 10 MG Oral Cap Take 1 capsule (10 mg total) by mouth daily. 90 capsule 3     ESOMEPRAZOLE MAGNESIUM 40 MG Oral Capsule Delayed Release TAKE 1 CAPSULE (40 MG TOTAL) BY MOUTH DAILY. 90 capsule 0    amoxicillin clavulanate 875-125 MG Oral Tab Take 1 tablet by mouth 2 (two) times daily for 10 days. 20 tablet 0    fluticasone propionate 50 MCG/ACT Nasal Suspension 2 sprays by Nasal route daily. 16 g 0    guaiFENesin 100 MG/5 ML Oral Take 10 mL (200 mg total) by mouth every 4 (four) hours as needed (cough). 118 mL 0    Beclomethasone Diprop HFA 80 MCG/ACT Inhalation Aerosol, Breath Activated Inhale 1 Inhalation into the lungs in the morning and 1 Inhalation before bedtime. 1 each 0     Allergies:  Allergies   Allergen Reactions    Bee ANAPHYLAXIS     Other reaction(s): Anaphylaxis, throat clouser    Beeswax ANAPHYLAXIS     Other reaction(s): Anaphylaxis,throat  closed    Seasonal Runny nose     Sneezing, sinus congestion       HISTORY:  Past Medical History:    Allergic rhinitis    Anxiety    Blepharitis of both eyes    OU; per NG    Essential hypertension    GERD (gastroesophageal reflux disease)    PPI; per NG    History of pneumonia    per NG    History of snoring    per NG    Hx of bee sting allergy    Epi-pen; per NG    Hx of hiatal hernia    per NG    Myopia of both eyes with astigmatism    OU; per NG    Prostatitis    per NG    Sinus problem    Sinus problems; per NG    Sleep apnea    Superficial punctate keratitis    OS; per NG      Past Surgical History:   Procedure Laterality Date    Colonoscopy  10/19/2015    internal hemorrhoids only    Egd      Electrocardiogram, complete  01-    SCANNED TO MEDIA TAB: 01-    Excision turbinate,submucous  04/12/2018    Extraction erupted tooth/exr  2014    Impacted wisdom tooth: Extraction; per NG    Nasal scopy,remv part ethmoid  04/12/2018    Nasal scopy,rmv tiss maxill sinus Left 04/12/2018    Repair of nasal septum  04/12/2018    Upper gi endoscopy performed        Family History   Problem Relation Age of Onset    Heart Disorder  Father     Diabetes Father 67        per NG    Obesity Father         per NG    Other (cardiac MI) Father     Hypertension Mother         per NG    Obesity Mother         per NG    Thyroid Disorder Mother         per NG    Heart Disorder Mother         Cardiac stent at 73 y/o    Other (Other) Maternal Grandmother     Other (Other) Maternal Grandfather     Other (Other) Paternal Grandmother     Heart Disease Paternal Grandfather 70        CAD, Cause of death; per NG    Other (Other) Paternal Grandfather     Heart Disorder Sister         bicuspid aortic valve    Obesity Sister         per NG    Heart Disease Sister 43        Congestive heart disease; per NG    No Known Problems Brother       Social History:   Social History     Socioeconomic History    Marital status:    Tobacco Use    Smoking status: Never     Passive exposure: Never    Smokeless tobacco: Never    Tobacco comments:     per NG   Vaping Use    Vaping status: Never Used   Substance and Sexual Activity    Alcohol use: Yes     Alcohol/week: 0.0 standard drinks of alcohol     Comment: socially - 2 beers/week    Drug use: No   Other Topics Concern    Caffeine Concern Yes     Comment: soda, 1 coke a day    Exercise Yes        PHYSICAL EXAM:    Physical Exam  Constitutional:       Appearance: He is well-developed. He is not ill-appearing.   HENT:      Right Ear: Ear canal normal.      Left Ear: Ear canal normal.      Mouth/Throat:      Pharynx: Oropharynx is clear.   Eyes:      Extraocular Movements: Extraocular movements intact.      Conjunctiva/sclera: Conjunctivae normal.      Pupils: Pupils are equal, round, and reactive to light.   Cardiovascular:      Rate and Rhythm: Normal rate and regular rhythm.      Heart sounds: Normal heart sounds.   Pulmonary:      Effort: Pulmonary effort is normal.      Breath sounds: Normal breath sounds.   Abdominal:      General: Bowel sounds are normal.      Palpations: Abdomen is soft.      Tenderness: There is no  abdominal tenderness.   Skin:     General: Skin is warm and dry.   Neurological:      Mental Status: He is alert.      Deep Tendon Reflexes: Reflexes are normal and symmetric.   Psychiatric:         Mood and Affect: Mood normal.              ASSESSMENT/PLAN:   (J30.9) Allergic rhinitis, unspecified seasonality, unspecified trigger  (primary encounter diagnosis)  Plan:medrol zpack albuterol and benzonatate  Rest  check for COVID  home test  Monitor   Follow up if not improivng    Patient voiced understanding  and agrees with plan        Meds This Visit:  Requested Prescriptions     Signed Prescriptions Disp Refills    methylPREDNISolone (MEDROL) 4 MG Oral Tablet Therapy Pack 1 each 0     Sig: As directed.    azithromycin (ZITHROMAX Z-SHITAL) 250 MG Oral Tab 6 tablet 0     Sig: Take 2 tablets (500 mg total) by mouth daily for 1 day, THEN 1 tablet (250 mg total) daily for 4 days.    albuterol (PROAIR HFA) 108 (90 Base) MCG/ACT Inhalation Aero Soln 1 each 0     Sig: Inhale 2 puffs into the lungs every 4 (four) hours as needed for Wheezing.    benzonatate 200 MG Oral Cap 60 capsule 0     Sig: Take 1 capsule (200 mg total) by mouth 3 (three) times daily as needed for cough.       Imaging & Referrals:  None        ID#[id number]

## 2024-06-19 NOTE — TELEPHONE ENCOUNTER
Per pharmacy, plan does not cover medication      Fluticasone Propionate, Inhal, (FLOVENT IN), Inhale into the lungs., Disp: , Rfl:     methylPREDNISolone (MEDROL) 4 MG Oral Tablet Therapy Pack, As directed., Disp: 1 each, Rfl: 0    ALTERNATIVES; ARUNITY, ASMANEX, QVAR

## 2024-06-19 NOTE — H&P
[facility]    History and Physical    Terry Dong Patient Status:  Specimen    1970 MRN [id number]   Location [facility] Attending No att. providers found   Hosp Day # 0 PCP Anthony Nesbitt MD     Date:  2024  Date of Admission:  (Not on file)    History provided by:{Persons; family members:[id number]}  HPI:     Chief Complaint   Patient presents with    Cough     Cough x 10 days      Cough        History     Past Medical History:    Allergic rhinitis    Anxiety    Blepharitis of both eyes    OU; per NG    Essential hypertension    GERD (gastroesophageal reflux disease)    PPI; per NG    History of pneumonia    per NG    History of snoring    per NG    Hx of bee sting allergy    Epi-pen; per NG    Hx of hiatal hernia    per NG    Myopia of both eyes with astigmatism    OU; per NG    Prostatitis    per NG    Sinus problem    Sinus problems; per NG    Sleep apnea    Superficial punctate keratitis    OS; per NG     Past Surgical History:   Procedure Laterality Date    Colonoscopy  10/19/2015    internal hemorrhoids only    Egd      Electrocardiogram, complete  2012    SCANNED TO MEDIA TAB: 2012    Excision turbinate,submucous  2018    Extraction erupted tooth/exr      Impacted wisdom tooth: Extraction; per NG    Nasal scopy,remv part ethmoid  2018    Nasal scopy,rmv tiss maxill sinus Left 2018    Repair of nasal septum  2018    Upper gi endoscopy performed       Family History   Problem Relation Age of Onset    Heart Disorder Father     Diabetes Father 67        per NG    Obesity Father         per NG    Other (cardiac MI) Father     Hypertension Mother         per NG    Obesity Mother         per NG    Thyroid Disorder Mother         per NG    Heart Disorder Mother         Cardiac stent at 73 y/o    Other (Other) Maternal Grandmother     Other (Other) Maternal Grandfather     Other  (Other) Paternal Grandmother     Heart Disease Paternal Grandfather 70        CAD, Cause of death; per NG    Other (Other) Paternal Grandfather     Heart Disorder Sister         bicuspid aortic valve    Obesity Sister         per NG    Heart Disease Sister 43        Congestive heart disease; per NG    No Known Problems Brother      Social History:  Social History     Socioeconomic History    Marital status:    Tobacco Use    Smoking status: Never     Passive exposure: Never    Smokeless tobacco: Never    Tobacco comments:     per NG   Vaping Use    Vaping status: Never Used   Substance and Sexual Activity    Alcohol use: Yes     Alcohol/week: 0.0 standard drinks of alcohol     Comment: socially - 2 beers/week    Drug use: No   Other Topics Concern    Caffeine Concern Yes     Comment: soda, 1 coke a day    Exercise Yes     Allergies/Medications:   Allergies:   Allergies   Allergen Reactions    Bee ANAPHYLAXIS     Other reaction(s): Anaphylaxis, throat clouser    Beeswax ANAPHYLAXIS     Other reaction(s): Anaphylaxis,throat  closed    Seasonal Runny nose     Sneezing, sinus congestion     (Not in a hospital admission)      Review of Systems:     Respiratory:  Positive for cough.        Physical Exam:   Vital Signs:  Blood pressure 128/80, pulse 94, height 6' 1\" (1.854 m), weight 220 lb (99.8 kg).  Physical Exam      Results:     Lab Results   Component Value Date    WBC 5.7 02/05/2024    HGB 15.0 02/05/2024    HCT 45.3 02/05/2024    .0 02/05/2024    CREATSERUM 1.19 02/05/2024    BUN 13 02/05/2024     02/05/2024    K 3.6 02/05/2024     02/05/2024    CO2 32.0 02/05/2024     (H) 02/05/2024    CA 9.4 02/05/2024    ALB 4.5 02/05/2024    ALKPHO 67 02/05/2024    BILT 0.5 02/05/2024    TP 7.7 02/05/2024    AST 24 02/05/2024    ALT 35 02/05/2024    TSH 2.527 02/05/2024    LIP 36 06/27/2023    PSA 1.9 11/09/2017    CRP 0.6 07/21/2015    TROP 0.00 08/09/2017     06/27/2023     No results  found.        Assessment/Plan:     * No active hospital problems. *              Adonis Mcmullen MD  6/19/2024

## 2024-06-26 ENCOUNTER — APPOINTMENT (OUTPATIENT)
Dept: GENERAL RADIOLOGY | Age: 54
End: 2024-06-26
Attending: Physician Assistant

## 2024-06-26 ENCOUNTER — HOSPITAL ENCOUNTER (OUTPATIENT)
Age: 54
Discharge: HOME | End: 2024-06-26

## 2024-06-26 VITALS
SYSTOLIC BLOOD PRESSURE: 132 MMHG | OXYGEN SATURATION: 95 % | DIASTOLIC BLOOD PRESSURE: 88 MMHG | TEMPERATURE: 97 F | RESPIRATION RATE: 18 BRPM | HEART RATE: 90 BPM

## 2024-06-26 DIAGNOSIS — J01.90 ACUTE SINUSITIS, RECURRENCE NOT SPECIFIED, UNSPECIFIED LOCATION: ICD-10-CM

## 2024-06-26 DIAGNOSIS — R05.9 COUGH: Primary | ICD-10-CM

## 2024-06-26 LAB — SARS-COV-2 RNA RESP QL NAA+PROBE: NOT DETECTED

## 2024-06-26 PROCEDURE — 71046 X-RAY EXAM CHEST 2 VIEWS: CPT | Performed by: PHYSICIAN ASSISTANT

## 2024-06-26 PROCEDURE — 99213 OFFICE O/P EST LOW 20 MIN: CPT | Performed by: PHYSICIAN ASSISTANT

## 2024-06-26 PROCEDURE — 94640 AIRWAY INHALATION TREATMENT: CPT | Performed by: PHYSICIAN ASSISTANT

## 2024-06-26 PROCEDURE — U0002 COVID-19 LAB TEST NON-CDC: HCPCS | Performed by: PHYSICIAN ASSISTANT

## 2024-06-26 RX ORDER — AMOXICILLIN AND CLAVULANATE POTASSIUM 875; 125 MG/1; MG/1
1 TABLET, FILM COATED ORAL 2 TIMES DAILY
Qty: 20 TABLET | Refills: 0 | Status: SHIPPED | OUTPATIENT
Start: 2024-06-26 | End: 2024-07-06

## 2024-06-26 RX ORDER — FLUTICASONE PROPIONATE 50 MCG
2 SPRAY, SUSPENSION (ML) NASAL DAILY
Qty: 16 G | Refills: 0 | Status: SHIPPED | OUTPATIENT
Start: 2024-06-26 | End: 2024-07-26

## 2024-06-26 RX ORDER — IPRATROPIUM BROMIDE AND ALBUTEROL SULFATE 2.5; .5 MG/3ML; MG/3ML
3 SOLUTION RESPIRATORY (INHALATION) ONCE
Status: COMPLETED | OUTPATIENT
Start: 2024-06-26 | End: 2024-06-26

## 2024-06-26 NOTE — DISCHARGE INSTRUCTIONS
Complete entire course of antibiotic for sinus infection as directed   Take cough medicine and nasal spray as directed     Drink plenty of water and get plenty of rest   You may benefit from taking a decongestant (e.g. Sudafed or Mucinex)  You may benefit from taking a daily allergy medication (e.g. Zyrtec)  You may benefit from using a humidifier    Alternate Tylenol and Motrin every 3 hours for pain or fever > 100.4 degrees    Sleep with head elevated and avoid laying flat  Avoid having air blow on your face    Wash hands often  Disinfect your environment  Do not share utensils or drinks    Symptoms may take a few weeks to resolve  Follow up with your primary care provider       If you experience severe/worsening symptoms, chest pain, shortness of breath, dizziness or lightheadedness, pain or swelling in your legs, or any other concerning symptoms, go directly to nearest ER immediately

## 2024-06-26 NOTE — ED PROVIDER NOTES
Chief Complaint   Patient presents with    Cough       History obtained from: patient   services not used    HPI:     Terry Dong is a 53 year old male who presents with cough x 2 weeks. Patient states cough is mostly dry though notes intermittently coughing up phlegm. Patient endorses associated nasal congestion and post-nasal drip into throat.  Patient saw his PCP on 6/19 for this cough and was prescribed benzonatate, Medrol Dosepak, and Z-Jesús.  Patient took all medications as prescribed without any improvement in symptoms.  Denies fevers, chills, chest pain, shortness of breath, headache, lightheadedness, neck stiffness, abdominal pain, vomiting, diarrhea, rash.  Patient returned from vacation in [place] 2 weeks ago.    PMH  Past Medical History:    Allergic rhinitis    Anxiety    Blepharitis of both eyes    OU; per NG    Essential hypertension    GERD (gastroesophageal reflux disease)    PPI; per NG    History of pneumonia    per NG    History of snoring    per NG    Hx of bee sting allergy    Epi-pen; per NG    Hx of hiatal hernia    per NG    Myopia of both eyes with astigmatism    OU; per NG    Prostatitis    per NG    Sinus problem    Sinus problems; per NG    Sleep apnea    Superficial punctate keratitis    OS; per NG       PFSH    PFSH asessment screens reviewed and agree.  Nurses notes reviewed I agree with documentation.    Family History   Problem Relation Age of Onset    Heart Disorder Father     Diabetes Father 67        per NG    Obesity Father         per NG    Other (cardiac MI) Father     Hypertension Mother         per NG    Obesity Mother         per NG    Thyroid Disorder Mother         per NG    Heart Disorder Mother         Cardiac stent at 73 y/o    Other (Other) Maternal Grandmother     Other (Other) Maternal Grandfather     Other (Other) Paternal Grandmother     Heart Disease Paternal Grandfather 70        CAD, Cause of death; per NG    Other (Other) Paternal Grandfather     Heart  Disorder Sister         bicuspid aortic valve    Obesity Sister         per NG    Heart Disease Sister 43        Congestive heart disease; per NG    No Known Problems Brother      Family history reviewed with patient/caregiver and is not pertinent to presenting problem.  Social History     Socioeconomic History    Marital status:      Spouse name: Not on file    Number of children: Not on file    Years of education: Not on file    Highest education level: Not on file   Occupational History    Not on file   Tobacco Use    Smoking status: Never     Passive exposure: Never    Smokeless tobacco: Never    Tobacco comments:     per NG   Vaping Use    Vaping status: Never Used   Substance and Sexual Activity    Alcohol use: Yes     Alcohol/week: 0.0 standard drinks of alcohol     Comment: socially - 2 beers/week    Drug use: No    Sexual activity: Not on file   Other Topics Concern     Service Not Asked    Blood Transfusions Not Asked    Caffeine Concern Yes     Comment: soda, 1 coke a day    Occupational Exposure Not Asked    Hobby Hazards Not Asked    Sleep Concern Not Asked    Stress Concern Not Asked    Weight Concern Not Asked    Special Diet Not Asked    Back Care Not Asked    Exercise Yes    Bike Helmet Not Asked    Seat Belt Not Asked    Self-Exams Not Asked   Social History Narrative    Not on file     Social Determinants of Health     Financial Resource Strain: Not on file   Food Insecurity: Not on file   Transportation Needs: Not on file   Physical Activity: Not on file   Stress: Not on file   Social Connections: Not on file   Housing Stability: Not on file         ROS:   Positive for stated complaint: cough, congestion, post-nasal drip    All other systems reviewed and negative except as noted above.  Constitutional and Vital Signs Reviewed.    Physical Exam:     Findings:    /88   Pulse 90   Temp 97.4 °F (36.3 °C) (Temporal)   Resp 18   SpO2 95%   GENERAL: well developed, no acute  distress, non-toxic appearing   SKIN: good skin turgor, no obvious rashes  HEAD: normocephalic, atraumatic  EYES: sclera non-icteric bilaterally, conjunctiva clear bilaterally  EARS: canals clear bilaterally, TMs clear bilaterally  NOSE: nasal congestion  OROPHARYNX: MMM, pharynx clear, no exudates or swelling, uvula midline, no tongue elevation, maintaining airway and secretions  NECK: supple, no lymphadenopathy, no nuchal rigidity, no trismus, no edema, phonation normal    CARDIO: RRR, normal heart sounds   LUNGS: clear to auscultation bilaterally, no increased WOB, no rales, rhonchi, or wheezes, occasional coughing   GI: normoactive bowel sounds, abdomen soft and non-tender   EXTREMITIES: no cyanosis or edema, FIGUEROA without difficulty, no lower extremity tenderness   NEURO: no focal deficits  PSYCH: alert and oriented x3, answering questions appropriately, mood appropriate    MDM/Assessment/Plan:   Orders for this encounter:    Orders Placed This Encounter    XR CHEST PA + LAT CHEST (JHT=75998)     Order Specific Question:   What is the Relevant Clinical Indication / Reason for Exam?     Answer:   Cough     Order Specific Question:   Release to patient     Answer:   Immediate    Rapid SARS-CoV-2 by PCR     Order Specific Question:   Release to patient     Answer:   Immediate    ipratropium-albuterol (Duoneb) 0.5-2.5 (3) MG/3ML inhalation solution 3 mL     Order Specific Question:   Which area/hospital     Answer:   Intermittent nebulizer    amoxicillin clavulanate 875-125 MG Oral Tab     Sig: Take 1 tablet by mouth 2 (two) times daily for 10 days.     Dispense:  20 tablet     Refill:  0    fluticasone propionate 50 MCG/ACT Nasal Suspension     Si sprays by Nasal route daily.     Dispense:  16 g     Refill:  0    guaiFENesin 100 MG/5 ML Oral     Sig: Take 10 mL (200 mg total) by mouth every 4 (four) hours as needed (cough).     Dispense:  118 mL     Refill:  0       Labs performed this visit:  Recent Results  (from the past 10 hour(s))   Rapid SARS-CoV-2 by PCR    Collection Time: 06/26/24  8:28 AM    Specimen: Nares; Other   Result Value Ref Range    Rapid SARS-CoV-2 by PCR Not Detected Not Detected       Imaging performed this visit:  XR CHEST PA + LAT CHEST (CPT=71046)   Final Result   PROCEDURE: XR CHEST PA + LAT CHEST (CPT=71046)       COMPARISON: [facility], XR CHEST PA + LAT CHEST    (CPT=71046), 1/08/2023, 12:49 PM.       INDICATIONS: Cough and wheezing x 2 weeks       TECHNIQUE:   Two views.         FINDINGS:    CARDIAC/VASC: Normal.  No cardiac silhouette abnormality or cardiomegaly.     Unremarkable pulmonary vasculature.     MEDIAST/EDER: No visible mass or adenopathy.    LUNGS/PLEURA: Minimal horizontal scarring/atelectasis at the left lung    base..  No significant pulmonary parenchymal abnormalities.  No effusion    or pleural thickening.     BONES: No fracture or visible bony lesion.    OTHER: Negative.                     =====   CONCLUSION:    1. No acute airspace disease.  Minimal left basal scarring/atelectasis.               Dictated by (CST): Selvin Mike MD on 6/26/2024 at 8:38 AM        Finalized by (CST): Selvin Mike MD on 6/26/2024 at 8:38 AM                   Medical Decision Making  DDx includes viral URI versus bronchitis versus pneumonia versus sinusitis versus other.  Patient is overall very well-appearing with stable vitals and tolerating oral intake.  No hypoxia or signs of respiratory distress.  Chest x-ray reviewed, no evidence of pneumonia, minimal left basal scarring versus atelectasis noted.  COVID test negative.  Patient given DuoNeb breathing treatment in IC.    On reassessment, patient is resting comfortably and remains well-appearing. Vitals remain stable and lungs are CTAB. Patient states he is feeling somewhat better after breathing treatment. Discussed possible etiologies of symptoms with patient. Given patient's recent travel from [place] and persistent  cough that is mostly dry, offered further evaluation for possible cardiopulmonary etiology including EKG and labs including troponin and d-dimer, though given constellation of symptoms and no chest pain or shortness of breath, suspect symptoms more likely due to respiratory vs sinus infection. Patient verbalizes understanding and declines further work up at this time. Will treat patient for sinus infection given constellation and chronicity of symptoms. Rx Augmentin. Rx Flonase nasal spray for nasal congestion. Rx guaifenesin for cough. Discussed supportive care including rest, increased fluid intake, and OTC Tylenol/Motrin as needed for pain or fevers. Instructed patient to go directly to nearest ER with any worsening or concerning symptoms. Follow up with PCP.      Amount and/or Complexity of Data Reviewed  External Data Reviewed: labs and notes.  Labs: ordered.  Radiology: ordered and independent interpretation performed.    Risk  OTC drugs.  Prescription drug management.          Diagnosis:    ICD-10-CM    1. Cough  R05.9 XR CHEST PA + LAT CHEST (CPT=71046)     XR CHEST PA + LAT CHEST (CPT=71046)     Rapid SARS-CoV-2 by PCR     Rapid SARS-CoV-2 by PCR      2. Acute sinusitis, recurrence not specified, unspecified location  J01.90           All results reviewed and discussed with patient/patient's family. Patient/patient's family verbalize excellent understanding of instructions and feels comfortable with plan. All of patient's/patient's family's questions were addressed.   See AVS for detailed discharge instructions for your condition today.    Follow Up with:  Anthony Nesbitt MD  [address] [place] [address] [address]  [facility] [id number] [phone]          Dex Saenz MD  [address] [place] [address] [id number] [phone]      ENT referral      Note: This document was dictated using Dragon medical dictation software.  Proofreading was performed to the best of my ability, but errors may be  present.    Marcia Zamora PA-C

## 2024-06-26 NOTE — ED INITIAL ASSESSMENT (HPI)
Pt has had a cough for 2 weeks.  Pt was seen at pmd's office on 6/19/24 and given steroids, inhaler, and zpack.  Pt states the cough is still lingering.  Pt cannot sleep at night.

## 2024-07-04 PROBLEM — Z87.19 HX OF GASTROESOPHAGEAL REFLUX (GERD): Status: ACTIVE | Noted: 2024-07-04

## 2024-07-04 PROBLEM — J98.01 COUGH DUE TO BRONCHOSPASM: Status: ACTIVE | Noted: 2024-07-04

## 2024-07-17 ENCOUNTER — NURSE TRIAGE (OUTPATIENT)
Dept: INTERNAL MEDICINE CLINIC | Facility: CLINIC | Age: 54
End: 2024-07-17

## 2024-07-17 NOTE — TELEPHONE ENCOUNTER
Action Requested: Summary for Provider     []  Critical Lab, Recommendations Needed  [x] Need Additional Advice  []   FYI    []   Need Orders  [] Need Medications Sent to Pharmacy  []  Other     SUMMARY: Spouse Augustina(on HIPAA) indicated that patient has had a cough for over 5 weeks now and it is still lingering. Patient saw Dr Mcmullen and was seen in urgent care. Coughing up phlegm occasionally. No fevers. Denies shortness of breath, wheezing, or chest pain. Sometimes cough is keeping him up at night. Was using claritin D in the past but the past few days using zyrtec instead. Feels a lot of phlegm in his throat. Post nasal drip. Using flonase and albuterol. Gets coughing spasms. No other symptoms. Wanted to see if Dr Nesbitt can see patient- no appointments available till next week. Please advise.     Reason for call: Cough/URI  Onset: Data Unavailable    Reason for Disposition   Cough has been present for > 3 weeks    Protocols used: Cough-A-OH

## 2024-07-17 NOTE — TELEPHONE ENCOUNTER
Spoke with patient's wife per SANDRA, Date of Birth verified.  She was informed of MD recommendation, she stated understanding.  Appt made with MD for eval & treat.          Future Appointments   Date Time Provider Department Center   7/18/2024  3:30 PM Anthony Nesbitt MD [facility]

## 2024-07-18 ENCOUNTER — OFFICE VISIT (OUTPATIENT)
Dept: INTERNAL MEDICINE CLINIC | Facility: CLINIC | Age: 54
End: 2024-07-18
Payer: COMMERCIAL

## 2024-07-18 VITALS
DIASTOLIC BLOOD PRESSURE: 78 MMHG | SYSTOLIC BLOOD PRESSURE: 122 MMHG | WEIGHT: 221.69 LBS | OXYGEN SATURATION: 96 % | HEART RATE: 101 BPM | TEMPERATURE: 98 F | BODY MASS INDEX: 29.38 KG/M2 | HEIGHT: 73 IN

## 2024-07-18 DIAGNOSIS — R05.1 ACUTE COUGH: Primary | ICD-10-CM

## 2024-07-18 PROCEDURE — 99213 OFFICE O/P EST LOW 20 MIN: CPT | Performed by: INTERNAL MEDICINE

## 2024-07-18 PROCEDURE — 3078F DIAST BP <80 MM HG: CPT | Performed by: INTERNAL MEDICINE

## 2024-07-18 PROCEDURE — 3074F SYST BP LT 130 MM HG: CPT | Performed by: INTERNAL MEDICINE

## 2024-07-18 PROCEDURE — 3008F BODY MASS INDEX DOCD: CPT | Performed by: INTERNAL MEDICINE

## 2024-07-18 RX ORDER — FLUTICASONE PROPIONATE 110 UG/1
2 AEROSOL, METERED RESPIRATORY (INHALATION) DAILY
COMMUNITY
End: 2024-07-18

## 2024-07-18 RX ORDER — FLUTICASONE PROPIONATE 220 UG/1
2 AEROSOL, METERED RESPIRATORY (INHALATION) 2 TIMES DAILY
Qty: 1 EACH | Refills: 0 | Status: SHIPPED | OUTPATIENT
Start: 2024-07-18

## 2024-07-18 NOTE — PROGRESS NOTES
Subjective:     Patient ID: Terry Dong is a 53 year old male.    Cough  This is a new problem. The current episode started more than 1 month ago. The problem has been waxing and waning. The problem occurs every few hours. The cough is Non-productive. Pertinent negatives include no fever, shortness of breath or wheezing. Nothing aggravates the symptoms. He has tried prescription cough suppressant, a beta-agonist inhaler and oral steroids (pt had been given 2 couses of abx for the past 5 weeks) for the symptoms. The treatment provided no relief. His past medical history is significant for asthma. There is no history of COPD.       History/Other:   Review of Systems   Constitutional: Negative.  Negative for fever.   Respiratory:  Positive for cough. Negative for shortness of breath and wheezing.    Cardiovascular: Negative.      Current Outpatient Medications   Medication Sig Dispense Refill    fluticasone propionate 110 MCG/ACT Inhalation Aerosol Inhale 2 puffs into the lungs daily.      fluticasone propionate 50 MCG/ACT Nasal Suspension 2 sprays by Nasal route daily. 16 g 0    Omega-3 Fatty Acids (FISH OIL OR) Take by mouth.      albuterol (PROAIR HFA) 108 (90 Base) MCG/ACT Inhalation Aero Soln Inhale 2 puffs into the lungs every 4 (four) hours as needed for Wheezing. 1 each 0    Beclomethasone Diprop HFA 80 MCG/ACT Inhalation Aerosol, Breath Activated Inhale 1 Inhalation into the lungs in the morning and 1 Inhalation before bedtime. 1 each 0    losartan-hydroCHLOROthiazide 50-12.5 MG Oral Tab Take 1 tablet by mouth daily. 90 tablet 3    loratadine-pseudoephedrine ER ([employer] ALLERGY RELIEF-D12) 5-120 MG Oral Tablet 12 Hr Take 1 tablet by mouth 2 (two) times daily. 180 tablet 1    FLUoxetine 10 MG Oral Cap Take 1 capsule (10 mg total) by mouth daily. 90 capsule 3    ESOMEPRAZOLE MAGNESIUM 40 MG Oral Capsule Delayed Release TAKE 1 CAPSULE (40 MG TOTAL) BY MOUTH DAILY. 90 capsule 0    guaiFENesin 100 MG/5 ML Oral Take 10  mL (200 mg total) by mouth every 4 (four) hours as needed (cough). (Patient not taking: Reported on 7/18/2024) 118 mL 0    methylPREDNISolone (MEDROL) 4 MG Oral Tablet Therapy Pack As directed. (Patient not taking: Reported on 7/18/2024) 1 each 0    benzonatate 200 MG Oral Cap Take 1 capsule (200 mg total) by mouth 3 (three) times daily as needed for cough. (Patient not taking: Reported on 7/18/2024) 60 capsule 0     Allergies:  Allergies   Allergen Reactions    Bee ANAPHYLAXIS     Other reaction(s): Anaphylaxis, throat clouser    Beeswax ANAPHYLAXIS     Other reaction(s): Anaphylaxis,throat  closed    Seasonal Runny nose     Sneezing, sinus congestion       Past Medical History:    Allergic rhinitis    Anxiety    Blepharitis of both eyes    OU; per NG    Essential hypertension    GERD (gastroesophageal reflux disease)    PPI; per NG    History of pneumonia    per NG    History of snoring    per NG    Hx of bee sting allergy    Epi-pen; per NG    Hx of hiatal hernia    per NG    Myopia of both eyes with astigmatism    OU; per NG    Prostatitis    per NG    Sinus problem    Sinus problems; per NG    Sleep apnea    Superficial punctate keratitis    OS; per NG      Past Surgical History:   Procedure Laterality Date    Colonoscopy  10/19/2015    internal hemorrhoids only    Egd      Electrocardiogram, complete  01-    SCANNED TO MEDIA TAB: 01-    Excision turbinate,submucous  04/12/2018    Extraction erupted tooth/exr  2014    Impacted wisdom tooth: Extraction; per NG    Nasal scopy,remv part ethmoid  04/12/2018    Nasal scopy,rmv tiss maxill sinus Left 04/12/2018    Repair of nasal septum  04/12/2018    Upper gi endoscopy performed        Family History   Problem Relation Age of Onset    Heart Disorder Father     Diabetes Father 67        per NG    Obesity Father         per NG    Other (cardiac MI) Father     Hypertension Mother         per NG    Obesity Mother         per NG    Thyroid Disorder Mother          per NG    Heart Disorder Mother         Cardiac stent at 73 y/o    Other (Other) Maternal Grandmother     Other (Other) Maternal Grandfather     Other (Other) Paternal Grandmother     Heart Disease Paternal Grandfather 70        CAD, Cause of death; per NG    Other (Other) Paternal Grandfather     Heart Disorder Sister         bicuspid aortic valve    Obesity Sister         per NG    Heart Disease Sister 43        Congestive heart disease; per NG    No Known Problems Brother       Social History:   Social History     Socioeconomic History    Marital status:    Tobacco Use    Smoking status: Never     Passive exposure: Never    Smokeless tobacco: Never    Tobacco comments:     per NG   Vaping Use    Vaping status: Never Used   Substance and Sexual Activity    Alcohol use: Yes     Alcohol/week: 0.0 standard drinks of alcohol     Comment: socially - 2 beers/week    Drug use: No   Other Topics Concern    Caffeine Concern Yes     Comment: soda, 1 coke a day    Exercise Yes        Objective:   Physical Exam  Constitutional:       General: He is not in acute distress.     Appearance: He is well-developed. He is not ill-appearing, toxic-appearing or diaphoretic.   HENT:      Head: Normocephalic and atraumatic.      Right Ear: External ear normal.      Left Ear: External ear normal.      Nose: Nose normal.      Mouth/Throat:      Pharynx: No oropharyngeal exudate.   Eyes:      General:         Right eye: No discharge.         Left eye: No discharge.      Conjunctiva/sclera: Conjunctivae normal.      Pupils: Pupils are equal, round, and reactive to light.   Neck:      Vascular: No JVD.   Cardiovascular:      Rate and Rhythm: Normal rate and regular rhythm.      Heart sounds: Normal heart sounds. No murmur heard.  Pulmonary:      Effort: Pulmonary effort is normal. No respiratory distress.      Breath sounds: Normal breath sounds. No wheezing or rales.   Abdominal:      General: Bowel sounds are normal. There is  no distension.      Palpations: Abdomen is soft. There is no mass.      Tenderness: There is no abdominal tenderness. There is no guarding or rebound.   Musculoskeletal:         General: No tenderness. Normal range of motion.      Cervical back: Normal range of motion and neck supple. No rigidity or tenderness.      Right lower leg: No edema.      Left lower leg: No edema.   Lymphadenopathy:      Cervical: No cervical adenopathy.   Skin:     General: Skin is warm and dry.      Findings: No rash.   Neurological:      Mental Status: He is alert and oriented to person, place, and time.         Assessment & Plan:   (R05.1) Acute cough  (primary encounter diagnosis)  Plan: has had negative covid/rsv/flu test and cxr few weeks ago already; had 2 courses of abx, and one initial course of medrol dospak. I told him suspect postinfectious cough don may last 8 weeks, I told him will put him on flovent 220mcg 2 puffs bid. Continue with cough med prn. Call back if coughing persist/worsens.        No orders of the defined types were placed in this encounter.      Meds This Visit:  Requested Prescriptions      No prescriptions requested or ordered in this encounter       Imaging & Referrals:  None

## 2024-08-19 ENCOUNTER — HOSPITAL ENCOUNTER (OUTPATIENT)
Age: 54
Discharge: HOME | End: 2024-08-19
Payer: COMMERCIAL

## 2024-08-19 VITALS
RESPIRATION RATE: 18 BRPM | DIASTOLIC BLOOD PRESSURE: 88 MMHG | SYSTOLIC BLOOD PRESSURE: 137 MMHG | HEART RATE: 92 BPM | OXYGEN SATURATION: 98 % | TEMPERATURE: 98 F

## 2024-08-19 DIAGNOSIS — N39.0 ACUTE UTI: Primary | ICD-10-CM

## 2024-08-19 LAB
BILIRUB UR QL STRIP: NEGATIVE
CLARITY UR: CLEAR
COLOR UR: YELLOW
GLUCOSE UR STRIP-MCNC: NEGATIVE MG/DL
KETONES UR STRIP-MCNC: NEGATIVE MG/DL
NITRITE UR QL STRIP: NEGATIVE
PH UR STRIP: 6 [PH]
PROT UR STRIP-MCNC: NEGATIVE MG/DL
SP GR UR STRIP: 1.02
UROBILINOGEN UR STRIP-ACNC: <2 MG/DL

## 2024-08-19 PROCEDURE — 99213 OFFICE O/P EST LOW 20 MIN: CPT | Performed by: NURSE PRACTITIONER

## 2024-08-19 PROCEDURE — 81002 URINALYSIS NONAUTO W/O SCOPE: CPT | Performed by: NURSE PRACTITIONER

## 2024-08-19 RX ORDER — SULFAMETHOXAZOLE/TRIMETHOPRIM 800-160 MG
1 TABLET ORAL 2 TIMES DAILY
Qty: 14 TABLET | Refills: 0 | Status: SHIPPED | OUTPATIENT
Start: 2024-08-19 | End: 2024-08-26

## 2024-08-19 RX ORDER — PHENAZOPYRIDINE HYDROCHLORIDE 200 MG/1
200 TABLET, FILM COATED ORAL 3 TIMES DAILY PRN
Qty: 6 TABLET | Refills: 0 | Status: SHIPPED | OUTPATIENT
Start: 2024-08-19 | End: 2024-08-26

## 2024-08-19 NOTE — ED PROVIDER NOTES
He    Patient Seen in: [facility]      History     Chief Complaint   Patient presents with    Urinary Symptoms            Stated Complaint: Painful urination  Subjective:   53-year-old healthy male presents for dysuria, urgency, and frequency that started last night.  He has no history of UTI in the past.  He denies any risks for STIs.  He denies any gertrude hematuria.  No abdominal or flank pain.  No nausea or vomiting.  No fevers or chills.  He denies any penile or testicular complaints.  No history of kidney stone in the past.  He appears nontoxic.      Objective:   Past Medical History:    Allergic rhinitis    Anxiety    Blepharitis of both eyes    OU; per NG    Essential hypertension    GERD (gastroesophageal reflux disease)    PPI; per NG    History of pneumonia    per NG    History of snoring    per NG    Hx of bee sting allergy    Epi-pen; per NG    Hx of hiatal hernia    per NG    Myopia of both eyes with astigmatism    OU; per NG    Prostatitis    per NG    Sinus problem    Sinus problems; per NG    Sleep apnea    Superficial punctate keratitis    OS; per NG            Past Surgical History:   Procedure Laterality Date    Colonoscopy  10/19/2015    internal hemorrhoids only    Egd      Electrocardiogram, complete  01-    SCANNED TO MEDIA TAB: 01-    Excision turbinate,submucous  04/12/2018    Extraction erupted tooth/exr  2014    Impacted wisdom tooth: Extraction; per NG    Nasal scopy,remv part ethmoid  04/12/2018    Nasal scopy,rmv tiss maxill sinus Left 04/12/2018    Repair of nasal septum  04/12/2018    Upper gi endoscopy performed                Social History     Socioeconomic History    Marital status:    Tobacco Use    Smoking status: Never     Passive exposure: Never    Smokeless tobacco: Never    Tobacco comments:     per NG   Vaping Use    Vaping status: Never Used   Substance and Sexual Activity    Alcohol use: Yes     Alcohol/week: 0.0 standard drinks of alcohol      Comment: socially - 2 beers/week    Drug use: No   Other Topics Concern    Caffeine Concern Yes     Comment: soda, 1 coke a day    Exercise Yes            Review of Systems    Positive for stated complaint: Urinary Symptoms (/)     Other systems are as noted in HPI.  Constitutional and vital signs reviewed.      All other systems reviewed and negative except as noted above.    Physical Exam     ED Triage Vitals [08/19/24 0802]   /88   Pulse 111   Resp 18   Temp 98.4 °F (36.9 °C)   Temp src Temporal   SpO2 98 %   O2 Device None (Room air)     Current:/88   Pulse 92   Temp 98.4 °F (36.9 °C) (Temporal)   Resp 18   SpO2 98%     Physical Exam  Vitals and nursing note reviewed.   Constitutional:       General: He is not in acute distress.     Appearance: Normal appearance. He is not toxic-appearing.   HENT:      Head: Normocephalic.      Nose: Nose normal.      Mouth/Throat:      Mouth: Mucous membranes are moist.   Cardiovascular:      Rate and Rhythm: Normal rate and regular rhythm.   Pulmonary:      Effort: Pulmonary effort is normal.      Breath sounds: Normal breath sounds.   Abdominal:      Palpations: Abdomen is soft.      Tenderness: There is no abdominal tenderness. There is no right CVA tenderness or left CVA tenderness.   Musculoskeletal:         General: Normal range of motion.      Cervical back: Normal range of motion.   Skin:     General: Skin is warm and dry.      Capillary Refill: Capillary refill takes less than 2 seconds.   Neurological:      General: No focal deficit present.      Mental Status: He is alert and oriented to person, place, and time.   Psychiatric:         Mood and Affect: Mood normal.         Behavior: Behavior normal.         ED Course   No results found.  Labs Reviewed   [facility] POCT URINALYSIS DIPSTICK - Abnormal; Notable for the following components:       Result Value    Blood, Urine Trace-Intact (*)     Leukocyte esterase urine Small (*)     All other components  within normal limits   URINE CULTURE, ROUTINE       [facility]     Medical Decision Making  The patient's urine shows trace blood and small leukocytes.  A culture is pending.  I will treat the patient with Bactrim for UTI.  I will also prescribe him Pyridium to take as needed for the symptoms.  We discussed drinking plenty of water and close follow-up with his primary care doctor.  He is aware if he develops any vomiting, fevers, abdominal pain, or any other worsening or concerning symptoms, to go to the nearest emergency department for further evaluation.    Amount and/or Complexity of Data Reviewed  Labs: ordered.     Details: The urine dip shows trace blood and trace leukocytes.  A culture is pending.    Risk  OTC drugs.  Prescription drug management.  Risk Details: Acute UTI versus urethritis versus prostatitis        Disposition and Plan     Clinical Impression:  1. Acute UTI         Disposition:  Discharge  8/19/2024  8:21 am    Follow-up:  Anthony Nesbitt MD  [address] [place] [address]  [phone]    Schedule an appointment as soon as possible for a visit   As needed          Medications Prescribed:  Discharge Medication List as of 8/19/2024  8:24 AM        START taking these medications    Details   sulfamethoxazole-trimethoprim -160 MG Oral Tab per tablet Take 1 tablet by mouth 2 (two) times daily for 7 days., Normal, Disp-14 tablet, R-0      phenazopyridine 200 MG Oral Tab Take 1 tablet (200 mg total) by mouth 3 (three) times daily as needed for Pain., Normal, Disp-6 tablet, R-0

## 2024-08-19 NOTE — DISCHARGE INSTRUCTIONS
Drink plenty of water. A urine culture is pending. Take the Bactrim as prescribed. Take the Pyridium as needed. Follow up with your doctor. Return for any concerns.

## 2024-08-22 ENCOUNTER — TELEPHONE (OUTPATIENT)
Dept: INTERNAL MEDICINE CLINIC | Facility: CLINIC | Age: 54
End: 2024-08-22

## 2024-08-22 ENCOUNTER — HOSPITAL ENCOUNTER (EMERGENCY)
Facility: HOSPITAL | Age: 54
Discharge: HOME | End: 2024-08-22
Attending: EMERGENCY MEDICINE
Payer: COMMERCIAL

## 2024-08-22 ENCOUNTER — APPOINTMENT (OUTPATIENT)
Dept: CT IMAGING | Facility: HOSPITAL | Age: 54
End: 2024-08-22
Attending: EMERGENCY MEDICINE
Payer: COMMERCIAL

## 2024-08-22 VITALS
BODY MASS INDEX: 29.16 KG/M2 | TEMPERATURE: 99 F | HEART RATE: 92 BPM | SYSTOLIC BLOOD PRESSURE: 103 MMHG | OXYGEN SATURATION: 94 % | HEIGHT: 73 IN | DIASTOLIC BLOOD PRESSURE: 74 MMHG | RESPIRATION RATE: 12 BRPM | WEIGHT: 220 LBS

## 2024-08-22 DIAGNOSIS — N30.00 ACUTE CYSTITIS WITHOUT HEMATURIA: Primary | ICD-10-CM

## 2024-08-22 DIAGNOSIS — N41.0 ACUTE PROSTATITIS: Primary | ICD-10-CM

## 2024-08-22 DIAGNOSIS — N41.0 ACUTE PROSTATITIS: ICD-10-CM

## 2024-08-22 LAB
ANION GAP SERPL CALC-SCNC: 9 MMOL/L (ref 0–18)
BASOPHILS # BLD AUTO: 0.02 X10(3) UL (ref 0–0.2)
BASOPHILS NFR BLD AUTO: 0.2 %
BILIRUB UR QL: NEGATIVE
BUN BLD-MCNC: 13 MG/DL (ref 9–23)
BUN/CREAT SERPL: 8.5 (ref 10–20)
CALCIUM BLD-MCNC: 9.8 MG/DL (ref 8.7–10.4)
CHLORIDE SERPL-SCNC: 98 MMOL/L (ref 98–112)
CLARITY UR: CLEAR
CO2 SERPL-SCNC: 26 MMOL/L (ref 21–32)
COLOR UR: YELLOW
CREAT BLD-MCNC: 1.53 MG/DL
DEPRECATED RDW RBC AUTO: 39.4 FL (ref 35.1–46.3)
EGFRCR SERPLBLD CKD-EPI 2021: 54 ML/MIN/1.73M2 (ref 60–?)
EOSINOPHIL # BLD AUTO: 0.08 X10(3) UL (ref 0–0.7)
EOSINOPHIL NFR BLD AUTO: 0.8 %
ERYTHROCYTE [DISTWIDTH] IN BLOOD BY AUTOMATED COUNT: 12.6 % (ref 11–15)
GLUCOSE BLD-MCNC: 116 MG/DL (ref 70–99)
GLUCOSE UR-MCNC: NORMAL MG/DL
HCT VFR BLD AUTO: 43.9 %
HGB BLD-MCNC: 14.9 G/DL
HGB UR QL STRIP.AUTO: NEGATIVE
IMM GRANULOCYTES # BLD AUTO: 0.03 X10(3) UL (ref 0–1)
IMM GRANULOCYTES NFR BLD: 0.3 %
KETONES UR-MCNC: NEGATIVE MG/DL
LACTATE SERPL-SCNC: 2.8 MMOL/L (ref 0.5–2)
LEUKOCYTE ESTERASE UR QL STRIP.AUTO: NEGATIVE
LYMPHOCYTES # BLD AUTO: 0.55 X10(3) UL (ref 1–4)
LYMPHOCYTES NFR BLD AUTO: 5.8 %
MCH RBC QN AUTO: 29 PG (ref 26–34)
MCHC RBC AUTO-ENTMCNC: 33.9 G/DL (ref 31–37)
MCV RBC AUTO: 85.6 FL
MONOCYTES # BLD AUTO: 0.49 X10(3) UL (ref 0.1–1)
MONOCYTES NFR BLD AUTO: 5.1 %
NEUTROPHILS # BLD AUTO: 8.37 X10 (3) UL (ref 1.5–7.7)
NEUTROPHILS # BLD AUTO: 8.37 X10(3) UL (ref 1.5–7.7)
NEUTROPHILS NFR BLD AUTO: 87.8 %
NITRITE UR QL STRIP.AUTO: NEGATIVE
OSMOLALITY SERPL CALC.SUM OF ELEC: 277 MOSM/KG (ref 275–295)
PH UR: 6.5 [PH] (ref 5–8)
PLATELET # BLD AUTO: 214 10(3)UL (ref 150–450)
POTASSIUM SERPL-SCNC: 2.9 MMOL/L (ref 3.5–5.1)
PROT UR-MCNC: 20 MG/DL
RBC # BLD AUTO: 5.13 X10(6)UL
SODIUM SERPL-SCNC: 133 MMOL/L (ref 136–145)
SP GR UR STRIP: 1.02 (ref 1–1.03)
UROBILINOGEN UR STRIP-ACNC: NORMAL
WBC # BLD AUTO: 9.5 X10(3) UL (ref 4–11)

## 2024-08-22 PROCEDURE — 99284 EMERGENCY DEPT VISIT MOD MDM: CPT

## 2024-08-22 PROCEDURE — 81001 URINALYSIS AUTO W/SCOPE: CPT | Performed by: EMERGENCY MEDICINE

## 2024-08-22 PROCEDURE — 83605 ASSAY OF LACTIC ACID: CPT | Performed by: EMERGENCY MEDICINE

## 2024-08-22 PROCEDURE — 96360 HYDRATION IV INFUSION INIT: CPT

## 2024-08-22 PROCEDURE — 96361 HYDRATE IV INFUSION ADD-ON: CPT

## 2024-08-22 PROCEDURE — 80048 BASIC METABOLIC PNL TOTAL CA: CPT | Performed by: EMERGENCY MEDICINE

## 2024-08-22 PROCEDURE — 87040 BLOOD CULTURE FOR BACTERIA: CPT | Performed by: EMERGENCY MEDICINE

## 2024-08-22 PROCEDURE — 85025 COMPLETE CBC W/AUTO DIFF WBC: CPT | Performed by: EMERGENCY MEDICINE

## 2024-08-22 PROCEDURE — 74177 CT ABD & PELVIS W/CONTRAST: CPT | Performed by: EMERGENCY MEDICINE

## 2024-08-22 RX ORDER — ACETAMINOPHEN 500 MG
1000 TABLET ORAL ONCE
Status: COMPLETED | OUTPATIENT
Start: 2024-08-22 | End: 2024-08-22

## 2024-08-22 RX ORDER — SULFAMETHOXAZOLE/TRIMETHOPRIM 800-160 MG
1 TABLET ORAL 2 TIMES DAILY
Qty: 14 TABLET | Refills: 0 | Status: SHIPPED | OUTPATIENT
Start: 2024-08-22 | End: 2024-08-29

## 2024-08-22 RX ORDER — POTASSIUM CHLORIDE 1500 MG/1
40 TABLET, EXTENDED RELEASE ORAL ONCE
Status: COMPLETED | OUTPATIENT
Start: 2024-08-22 | End: 2024-08-22

## 2024-08-22 NOTE — ED INITIAL ASSESSMENT (HPI)
Pt to ED for worsening bladder infections s/s and fever. States he was diagnosed with bladder infection on Monday, given abx. States s/s resolved for a day and then came back with fever. Tachycardic in triage.

## 2024-08-22 NOTE — TELEPHONE ENCOUNTER
Spoke with the patient,verified full name and     Informed him of message and provided him with the referral information via [facility] per patient request.    Patient asking if any thing else can be done, still with low grade temp 100.5,    Has been taking tylenol with the last dose of 1000 mg     Advised to increase fluids, and alternate with ibuprofen, and ambulate.    Asking we sent to Dr. Nesbitt for his advise also.    Please advise

## 2024-08-22 NOTE — DISCHARGE INSTRUCTIONS
Continue the antibiotics until completion.  You will be contacted if your cultures warrant further investigation or return visit to the hospital.  Drink plenty of fluids.  Follow-up with your doctor and come back with worsening or change.

## 2024-08-22 NOTE — ED PROVIDER NOTES
Patient Seen in: [facility] Emergency Department      History     Chief Complaint   Patient presents with    Urinary Symptoms    Fever     Stated Complaint: fever, urinary symptoms;    Subjective:   HPI    53-year-old relatively healthy male who 5 or less years ago had an episode of some incontinence with a reported normal urology visit and described cystoscopy in the office who began developing urinary symptoms Sunday night went to immediate care on Monday was diagnosed with UTI.  He has been taking Bactrim now 7 doses then.  He was feeling better until last night he had a fever again.  He denies back or flank pain or history of kidney stones.  He had a negative COVID test.  No other overt symptoms.  He reports some nausea and decreased appetite.    Objective:   Past Medical History:    Allergic rhinitis    Anxiety    Blepharitis of both eyes    OU; per NG    Essential hypertension    GERD (gastroesophageal reflux disease)    PPI; per NG    History of pneumonia    per NG    History of snoring    per NG    Hx of bee sting allergy    Epi-pen; per NG    Hx of hiatal hernia    per NG    Myopia of both eyes with astigmatism    OU; per NG    Prostatitis    per NG    Sinus problem    Sinus problems; per NG    Sleep apnea    Superficial punctate keratitis    OS; per NG              Past Surgical History:   Procedure Laterality Date    Colonoscopy  10/19/2015    internal hemorrhoids only    Egd      Electrocardiogram, complete  01-    SCANNED TO MEDIA TAB: 01-    Excision turbinate,submucous  04/12/2018    Extraction erupted tooth/exr  2014    Impacted wisdom tooth: Extraction; per NG    Nasal scopy,remv part ethmoid  04/12/2018    Nasal scopy,rmv tiss maxill sinus Left 04/12/2018    Repair of nasal septum  04/12/2018    Upper gi endoscopy performed                  Social History     Socioeconomic History    Marital status:    Tobacco Use    Smoking status: Never     Passive exposure: Never     Smokeless tobacco: Never    Tobacco comments:     per NG   Vaping Use    Vaping status: Never Used   Substance and Sexual Activity    Alcohol use: Yes     Alcohol/week: 0.0 standard drinks of alcohol     Comment: socially - 2 beers/week    Drug use: No   Other Topics Concern    Caffeine Concern Yes     Comment: soda, 1 coke a day    Exercise Yes              Review of Systems    Positive for stated Chief Complaint: Urinary Symptoms and Fever    Other systems are as noted in HPI.  Constitutional and vital signs reviewed.      All other systems reviewed and negative except as noted above.    Physical Exam     ED Triage Vitals [08/22/24 0743]   BP (!) 145/95   Pulse (!) 124   Resp 20   Temp 100 °F (37.8 °C)   Temp src Oral   SpO2 97 %   O2 Device None (Room air)       Current Vitals:   Vital Signs  BP: 103/74  Pulse: 92  Resp: 12  Temp: 98.9 °F (37.2 °C)  Temp src: Temporal  MAP (mmHg): 83    Oxygen Therapy  SpO2: 94 %  O2 Device: None (Room air)            Physical Exam    Constitutional: Oriented to person, place, and time.  Appears well-developed. No distress.   Head: Normocephalic and atraumatic.   Eyes: Conjunctivae are normal. Pupils are equal, round, and reactive to light.   Neck: Normal range of motion. Neck supple.   Cardiovascular: Mild tachycardia, regular rhythm and intact distal pulses.    Pulmonary/Chest: Effort normal. No respiratory distress.  Lungs are clear.  No wheeze or crackles  Abdominal: Soft. There is no tenderness. There is no guarding.   Musculoskeletal: Normal range of motion. No edema or tenderness.  No flank pain  Neurological: Alert and oriented to person, place, and time.   Skin: Skin is warm and dry.   Psychiatric: Normal mood and affect.  Behavior is normal.   Nursing note and vitals reviewed.    Differential diagnosis includes UTI and urosepsis, obstructive uropathy, hydronephrosis.      ED Course     Labs Reviewed   BASIC METABOLIC PANEL (8) - Abnormal; Notable for the following  components:       Result Value    Glucose 116 (*)     Sodium 133 (*)     Potassium 2.9 (*)     Creatinine 1.53 (*)     BUN/CREA Ratio 8.5 (*)     eGFR-Cr 54 (*)     All other components within normal limits   CBC WITH DIFFERENTIAL WITH PLATELET - Abnormal; Notable for the following components:    Neutrophil Absolute Prelim 8.37 (*)     Neutrophil Absolute 8.37 (*)     Lymphocyte Absolute 0.55 (*)     All other components within normal limits   LACTIC ACID, PLASMA - Abnormal; Notable for the following components:    Lactic Acid 2.8 (*)     All other components within normal limits   URINALYSIS, ROUTINE - Abnormal; Notable for the following components:    Protein Urine 20 (*)     All other components within normal limits   RAINBOW DRAW LAVENDER   RAINBOW DRAW BLUE   RAINBOW DRAW GOLD   BLOOD CULTURE   BLOOD CULTURE             CT ABDOMEN+PELVIS(CONTRAST ONLY)(CPT=74177)    Result Date: 8/22/2024  PROCEDURE: CT ABDOMEN + PELVIS (CONTRAST ONLY) (CPT=74177)  COMPARISON: [facility], US ABDOMEN COMPLETE (CPT=76700), 12/24/2018, 8:13 AM.  [facility], CT ABDOMEN + PELVIS (CONTRAST ONLY) (CPT=74177), 8/09/2017, 4:21 PM.  INDICATIONS: Fever, urinary symptoms.  TECHNIQUE: Multidetector CT images of the abdomen and pelvis were obtained with non-ionic intravenous contrast material. Automated exposure control for dose reduction was used. Adjustment of the mA and/or kV was done based on the patient's size. Iterative reconstruction technique for dose reduction was employed. Dose information was transmitted to the ACR (American College of Radiology) NRDR (National Radiology Data Registry), which includes the Dose Index Registry.  No oral contrast was ingested.  FINDINGS: LUNG BASES: The heart is normal in size.  The coronary arteries are incompletely assessed on this exam.  There is mild dependent atelectasis at both visualized lung bases.  LIVER: The liver is mildly hypoenhancing relative to the spleen  consistent with steatosis, unchanged from the previous ultrasound.  No discrete liver lesions. BILIARY: The gallbladder is nondilated.  The biliary tree is normal in caliber. PANCREAS: No lesion, fluid collection, ductal dilatation, or atrophy.  SPLEEN: No enlargement.  ADRENALS:   No defined mass or abnormal enlargement.  KIDNEYS:   There is a 2 mm corticomedullary junction stone in the superior right renal pole, new from the prior CT.  The kidneys otherwise enhance symmetrically without hydronephrosis or suspicious lesions. GI/MESENTERY:  Evaluation of the gastrointestinal tract is limited without enteric contrast.  There is no bowel obstruction.  The appendix is normal. URINARY BLADDER: Assessment of the bladder is limited by suboptimal luminal distention.  No stones. PELVIC NODES: No lymphadenopathy.   PELVIC ORGANS: The prostate gland remains enlarged. VASCULATURE:   No aneurysm is detected. RETROPERITONEUM: No mass or lymphadenopathy is apparent.  BONES:   No significant bony lesion or fracture.  There are mild spondylotic changes in the lumbar spine. ABDOMINAL WALL: There is a stable small fat containing umbilical hernia without bowel loop involvement. OTHER: No free air or fluid is seen in the abdomen or pelvis.          CONCLUSION:  1. No acute intra-abdominal process.  No CT evidence of pyelonephritis or hydronephrosis. 2. Subcentimeter nonobstructing right renal stone. 3. Stable prostatomegaly. 4. Stable hepatic steatosis. 5. Stable small fat containing umbilical hernia.    Dictated by (CST): Dylon Senior MD on 8/22/2024 at 10:39 AM     Finalized by (CST): Dylon Senior MD on 8/22/2024 at 10:43 AM                  [facility]                                         Medical Decision Making  Patient clinically looks well.  I am not concerned at all about the minimally elevated lactate.  He has no white count.  His heart rate is improved with fluids.  He does not appear septic.  We did cultures.  He will  continue the Bactrim.  I do have concerns about possible prostatitis based on his history and ongoing symptoms.  I will give him urology to follow-up with in addition his primary care doctor.  He will focus on fluids today.  Take the antibiotic and extend it for a week.  He will be contacted if his cultures show any abnormalities or if he has worsening symptoms he should come back.  His wife is here with him now.  He can do Tylenol or Motrin for fever.    Problems Addressed:  Acute cystitis without hematuria: acute illness or injury with systemic symptoms  Acute prostatitis: acute illness or injury with systemic symptoms that poses a threat to life or bodily functions    Amount and/or Complexity of Data Reviewed  External Data Reviewed: labs.     Details: 8/19/2024 urine culture reviewed, patient had greater than 102,000 E. coli resistant only to ampicillin  Labs: ordered. Decision-making details documented in ED Course.  Radiology: ordered and independent interpretation performed. Decision-making details documented in ED Course.     Details:  by my review of the CT abdomen/pelvis there is no obvious evidence of bowel obstruction, free intraperitoneal air or significant free fluid.    Risk  OTC drugs.  Prescription drug management.  Decision regarding hospitalization.        Disposition and Plan     Clinical Impression:  1. Acute cystitis without hematuria    2. Acute prostatitis         Disposition:  Discharge  8/22/2024 11:53 am    Follow-up:  Anthony Nesbitt MD  [address] [place] [address] [address]  [facility] [address]  [phone]    Call      Jenny Kelly MD  [address] [place] [address]  [phone]    Schedule an appointment as soon as possible for a visit            Medications Prescribed:  Current Discharge Medication List        START taking these medications    Details   !! sulfamethoxazole-trimethoprim -160 MG Oral Tab per tablet Take 1 tablet by mouth 2 (two) times daily for 7  days.  Qty: 14 tablet, Refills: 0       !! - Potential duplicate medications found. Please discuss with provider.

## 2024-08-22 NOTE — TELEPHONE ENCOUNTER
Patient contacts clinic reprting that he was recently seen in immediate care and emergency room for UTI and prostate infection.  Given 2 courses of antibiotics.  He is feeling better but inquires on follow up.  Emergency room mentioned possible seeing urology.  He also reports that dry cough has returned since urinary symptoms began.  Dr. Nesbitt please advise if you would like to see patient or refer to specialist.

## 2024-08-23 ENCOUNTER — OFFICE VISIT (OUTPATIENT)
Dept: INTERNAL MEDICINE CLINIC | Facility: CLINIC | Age: 54
End: 2024-08-23

## 2024-08-23 VITALS
DIASTOLIC BLOOD PRESSURE: 81 MMHG | HEART RATE: 90 BPM | BODY MASS INDEX: 29.16 KG/M2 | WEIGHT: 220 LBS | TEMPERATURE: 98 F | HEIGHT: 73 IN | SYSTOLIC BLOOD PRESSURE: 124 MMHG

## 2024-08-23 DIAGNOSIS — N39.0 URINARY TRACT INFECTION WITHOUT HEMATURIA, SITE UNSPECIFIED: Primary | ICD-10-CM

## 2024-08-23 PROCEDURE — 99214 OFFICE O/P EST MOD 30 MIN: CPT | Performed by: INTERNAL MEDICINE

## 2024-08-23 PROCEDURE — 3079F DIAST BP 80-89 MM HG: CPT | Performed by: INTERNAL MEDICINE

## 2024-08-23 PROCEDURE — 3008F BODY MASS INDEX DOCD: CPT | Performed by: INTERNAL MEDICINE

## 2024-08-23 PROCEDURE — 3074F SYST BP LT 130 MM HG: CPT | Performed by: INTERNAL MEDICINE

## 2024-08-23 NOTE — PROGRESS NOTES
Subjective:     Patient ID: Terry Dong is a 53 year old male.    Monday had fever/chills, urinary symptoms, seen IC ,     UTI  This is a new problem. The current episode started in the past 7 days. The problem occurs intermittently. Associated symptoms include chills, congestion and a fever. Pertinent negatives include no nausea or vomiting. Nothing aggravates the symptoms. He has tried rest, drinking, acetaminophen and NSAIDs (pt found to have uti, started on op bactrim in IC, and also continued in ER) for the symptoms. The treatment provided mild (some improvement today, afebrile, voiding better) relief.       History/Other:   Review of Systems   Constitutional:  Positive for chills and fever. Negative for appetite change.   HENT:  Positive for congestion.    Respiratory: Negative.     Gastrointestinal: Negative.  Negative for anal bleeding, blood in stool, nausea and vomiting.   Genitourinary:  Positive for dysuria and frequency. Negative for flank pain and hematuria.     Current Outpatient Medications   Medication Sig Dispense Refill    sulfamethoxazole-trimethoprim -160 MG Oral Tab per tablet Take 1 tablet by mouth 2 (two) times daily for 7 days. 14 tablet 0    sulfamethoxazole-trimethoprim -160 MG Oral Tab per tablet Take 1 tablet by mouth 2 (two) times daily for 7 days. 14 tablet 0    phenazopyridine 200 MG Oral Tab Take 1 tablet (200 mg total) by mouth 3 (three) times daily as needed for Pain. 6 tablet 0    Fluticasone Propionate  MCG/ACT Inhalation Aerosol Inhale 2 puffs into the lungs 2 (two) times daily. 1 each 0    guaiFENesin 100 MG/5 ML Oral Take 10 mL (200 mg total) by mouth every 4 (four) hours as needed (cough). (Patient not taking: Reported on 7/18/2024) 118 mL 0    Omega-3 Fatty Acids (FISH OIL OR) Take by mouth.      methylPREDNISolone (MEDROL) 4 MG Oral Tablet Therapy Pack As directed. (Patient not taking: Reported on 7/18/2024) 1 each 0    albuterol (PROAIR HFA) 108 (90  Base) MCG/ACT Inhalation Aero Soln Inhale 2 puffs into the lungs every 4 (four) hours as needed for Wheezing. 1 each 0    benzonatate 200 MG Oral Cap Take 1 capsule (200 mg total) by mouth 3 (three) times daily as needed for cough. (Patient not taking: Reported on 7/18/2024) 60 capsule 0    losartan-hydroCHLOROthiazide 50-12.5 MG Oral Tab Take 1 tablet by mouth daily. 90 tablet 3    loratadine-pseudoephedrine ER ([employer] ALLERGY RELIEF-D12) 5-120 MG Oral Tablet 12 Hr Take 1 tablet by mouth 2 (two) times daily. 180 tablet 1    FLUoxetine 10 MG Oral Cap Take 1 capsule (10 mg total) by mouth daily. 90 capsule 3    ESOMEPRAZOLE MAGNESIUM 40 MG Oral Capsule Delayed Release TAKE 1 CAPSULE (40 MG TOTAL) BY MOUTH DAILY. 90 capsule 0     Allergies:  Allergies   Allergen Reactions    Bee ANAPHYLAXIS     Other reaction(s): Anaphylaxis, throat clouser    Beeswax ANAPHYLAXIS     Other reaction(s): Anaphylaxis,throat  closed    Seasonal Runny nose     Sneezing, sinus congestion       Past Medical History:    Allergic rhinitis    Anxiety    Blepharitis of both eyes    OU; per NG    Essential hypertension    GERD (gastroesophageal reflux disease)    PPI; per NG    History of pneumonia    per NG    History of snoring    per NG    Hx of bee sting allergy    Epi-pen; per NG    Hx of hiatal hernia    per NG    Myopia of both eyes with astigmatism    OU; per NG    Prostatitis    per NG    Sinus problem    Sinus problems; per NG    Sleep apnea    Superficial punctate keratitis    OS; per NG      Past Surgical History:   Procedure Laterality Date    Colonoscopy  10/19/2015    internal hemorrhoids only    Egd      Electrocardiogram, complete  01-    SCANNED TO MEDIA TAB: 01-    Excision turbinate,submucous  04/12/2018    Extraction erupted tooth/exr  2014    Impacted wisdom tooth: Extraction; per NG    Nasal scopy,remv part ethmoid  04/12/2018    Nasal scopy,rmv tiss maxill sinus Left 04/12/2018    Repair of nasal septum   04/12/2018    Upper gi endoscopy performed        Family History   Problem Relation Age of Onset    Heart Disorder Father     Diabetes Father 67        per NG    Obesity Father         per NG    Other (cardiac MI) Father     Hypertension Mother         per NG    Obesity Mother         per NG    Thyroid Disorder Mother         per NG    Heart Disorder Mother         Cardiac stent at 71 y/o    Other (Other) Maternal Grandmother     Other (Other) Maternal Grandfather     Other (Other) Paternal Grandmother     Heart Disease Paternal Grandfather 70        CAD, Cause of death; per NG    Other (Other) Paternal Grandfather     Heart Disorder Sister         bicuspid aortic valve    Obesity Sister         per NG    Heart Disease Sister 43        Congestive heart disease; per NG    No Known Problems Brother       Social History:   Social History     Socioeconomic History    Marital status:    Tobacco Use    Smoking status: Never     Passive exposure: Never    Smokeless tobacco: Never    Tobacco comments:     per NG   Vaping Use    Vaping status: Never Used   Substance and Sexual Activity    Alcohol use: Yes     Alcohol/week: 0.0 standard drinks of alcohol     Comment: socially - 2 beers/week    Drug use: No   Other Topics Concern    Caffeine Concern Yes     Comment: soda, 1 coke a day    Exercise Yes        Objective:   Physical Exam  Constitutional:       General: He is not in acute distress.     Appearance: He is well-developed. He is not ill-appearing, toxic-appearing or diaphoretic.   HENT:      Head: Normocephalic and atraumatic.      Right Ear: External ear normal.      Left Ear: External ear normal.      Nose: Nose normal.      Mouth/Throat:      Pharynx: No oropharyngeal exudate.   Eyes:      General:         Right eye: No discharge.         Left eye: No discharge.      Conjunctiva/sclera: Conjunctivae normal.      Pupils: Pupils are equal, round, and reactive to light.   Neck:      Vascular: No JVD.    Cardiovascular:      Rate and Rhythm: Normal rate and regular rhythm.      Heart sounds: Normal heart sounds. No murmur heard.     No gallop.   Pulmonary:      Effort: Pulmonary effort is normal. No respiratory distress.      Breath sounds: Normal breath sounds. No wheezing or rales.   Abdominal:      General: Bowel sounds are normal. There is no distension.      Palpations: Abdomen is soft. There is no mass.      Tenderness: There is no abdominal tenderness. There is no right CVA tenderness, left CVA tenderness, guarding or rebound. Negative signs include Yanes's sign and McBurney's sign.   Musculoskeletal:         General: Normal range of motion.      Cervical back: Normal range of motion and neck supple. Tenderness present. No rigidity.      Right lower leg: No edema.      Left lower leg: No edema.   Lymphadenopathy:      Cervical: No cervical adenopathy.   Skin:     General: Skin is warm and dry.      Findings: No rash.   Neurological:      Mental Status: He is alert and oriented to person, place, and time.         Assessment & Plan:   (N39.0) Urinary tract infection without hematuria, site unspecified  (primary encounter diagnosis)  Plan: Basic Metabolic Panel (8)        ER notes reviewed; ct result reviewed as well as  his labs.   His urine culture from IC  + E coli and S to bactrim which pt is currently taking;   Ct scan showed no signs of acute pyelo and no hydronephrosis; there is non obstructing right kidney stone.  Prostate mildly enlarged but stable from before.   Pt clinically improving, afebrile this am, last fever last night low grade so trending down as d/w pt which is good.   Blood culture negative today.   Pt to continue bactrim as prescribed. Possibilty of acute prostatitis just given his symptoms. He already has apptment to see our urologist.   Pt also had mlldy elevated creatinine so told to avoid nsaids and take only tylenol for pain or fevers. Will check bmp in am, for now, increase oral  fluids.   Pt to go back to ER if any worsening of symptoms.        No orders of the defined types were placed in this encounter.      Meds This Visit:  Requested Prescriptions      No prescriptions requested or ordered in this encounter       Imaging & Referrals:  None

## 2024-08-23 NOTE — TELEPHONE ENCOUNTER
Pls have pt ffup with me tomorrow Friday at 11:45am; his wife is also scheduled to see me tomorrow at 11:30am so I will see amy for ffup ; continue abx and tylenol only for fevers.  If worsens, go back to ER.

## 2024-08-24 ENCOUNTER — LAB ENCOUNTER (OUTPATIENT)
Dept: LAB | Age: 54
End: 2024-08-24
Attending: INTERNAL MEDICINE
Payer: COMMERCIAL

## 2024-08-24 DIAGNOSIS — N39.0 URINARY TRACT INFECTION WITHOUT HEMATURIA, SITE UNSPECIFIED: ICD-10-CM

## 2024-08-24 LAB
ANION GAP SERPL CALC-SCNC: 9 MMOL/L (ref 0–18)
BUN BLD-MCNC: 12 MG/DL (ref 9–23)
BUN/CREAT SERPL: 8.7 (ref 10–20)
CALCIUM BLD-MCNC: 9.8 MG/DL (ref 8.7–10.4)
CHLORIDE SERPL-SCNC: 101 MMOL/L (ref 98–112)
CO2 SERPL-SCNC: 27 MMOL/L (ref 21–32)
CREAT BLD-MCNC: 1.38 MG/DL
EGFRCR SERPLBLD CKD-EPI 2021: 62 ML/MIN/1.73M2 (ref 60–?)
FASTING STATUS PATIENT QL REPORTED: NO
GLUCOSE BLD-MCNC: 123 MG/DL (ref 70–99)
OSMOLALITY SERPL CALC.SUM OF ELEC: 285 MOSM/KG (ref 275–295)
POTASSIUM SERPL-SCNC: 3.4 MMOL/L (ref 3.5–5.1)
SODIUM SERPL-SCNC: 137 MMOL/L (ref 136–145)

## 2024-08-24 PROCEDURE — 80048 BASIC METABOLIC PNL TOTAL CA: CPT

## 2024-08-24 PROCEDURE — 36415 COLL VENOUS BLD VENIPUNCTURE: CPT

## 2024-08-25 ENCOUNTER — TELEPHONE (OUTPATIENT)
Dept: INTERNAL MEDICINE CLINIC | Facility: CLINIC | Age: 54
End: 2024-08-25

## 2024-08-25 DIAGNOSIS — R79.89 ELEVATED SERUM CREATININE: Primary | ICD-10-CM

## 2024-09-03 ENCOUNTER — OFFICE VISIT (OUTPATIENT)
Dept: SURGERY | Facility: CLINIC | Age: 54
End: 2024-09-03
Payer: COMMERCIAL

## 2024-09-03 DIAGNOSIS — N30.90 CYSTITIS WITHOUT HEMATURIA: Primary | ICD-10-CM

## 2024-09-03 PROCEDURE — 99204 OFFICE O/P NEW MOD 45 MIN: CPT | Performed by: UROLOGY

## 2024-09-03 RX ORDER — SULFAMETHOXAZOLE/TRIMETHOPRIM 800-160 MG
1 TABLET ORAL 2 TIMES DAILY
Qty: 8 TABLET | Refills: 0 | Status: SHIPPED | OUTPATIENT
Start: 2024-09-03 | End: 2024-09-07

## 2024-09-03 NOTE — PROGRESS NOTES
SUBJECTIVE:  Terry Dong is a 53 year old male who presents for a consultation at the request of, and a copy of this note will be sent to, Anthony Chance, for evaluation of  resolved UTI. He states that the problem is unchanged. Symptoms include episode of symptomatic UTI symptoms including fevers, chills, frequency, dysuria.  He went to urgent care August 19 and then to the emergency department August 22 when he developed fevers.  CT abdomen pelvis showed an incidental 2 mm nonobstructing stone on the right side no hydronephrosis.  He had been prescribed Bactrim on August 19 and told to finish the course.  States his symptoms are mostly resolved.  Has occasional mild dysuria.  No gross hematuria.  No previous history of UTIs.  Had isolated resolved episode of incontinence years ago for which she saw urologist.  No details or records are provided.  No baseline urination symptoms that are bothersome.  IPSS score 10 quality-of-life index 2.  He states this reflects symptoms currently.. He denies any family history of prostate cancer..  PSA February 5, 2024 normal at 1.99.  Urine culture in the ER did not grow more than 100,000 colonies of E. coli resistant only to ampicillin  Allergies:   Allergies   Allergen Reactions    Bee ANAPHYLAXIS     Other reaction(s): Anaphylaxis, throat clouser    Beeswax ANAPHYLAXIS     Other reaction(s): Anaphylaxis,throat  closed    Seasonal Runny nose     Sneezing, sinus congestion       History:  Past Medical History:    Allergic rhinitis    Anxiety    Blepharitis of both eyes    OU; per NG    Essential hypertension    GERD (gastroesophageal reflux disease)    PPI; per NG    History of pneumonia    per NG    History of snoring    per NG    Hx of bee sting allergy    Epi-pen; per NG    Hx of hiatal hernia    per NG    Myopia of both eyes with astigmatism    OU; per NG    Prostatitis    per NG    Sinus problem    Sinus problems; per NG    Sleep apnea    Superficial punctate  keratitis    OS; per NG      Past Surgical History:   Procedure Laterality Date    Colonoscopy  10/19/2015    internal hemorrhoids only    Egd      Electrocardiogram, complete  01-    SCANNED TO MEDIA TAB: 01-    Excision turbinate,submucous  04/12/2018    Extraction erupted tooth/exr  2014    Impacted wisdom tooth: Extraction; per NG    Nasal scopy,remv part ethmoid  04/12/2018    Nasal scopy,rmv tiss maxill sinus Left 04/12/2018    Repair of nasal septum  04/12/2018    Upper gi endoscopy performed        Family History   Problem Relation Age of Onset    Heart Disorder Father     Diabetes Father 67        per NG    Obesity Father         per NG    Other (cardiac MI) Father     Hypertension Mother         per NG    Obesity Mother         per NG    Thyroid Disorder Mother         per NG    Heart Disorder Mother         Cardiac stent at 73 y/o    Other (Other) Maternal Grandmother     Other (Other) Maternal Grandfather     Other (Other) Paternal Grandmother     Heart Disease Paternal Grandfather 70        CAD, Cause of death; per NG    Other (Other) Paternal Grandfather     Heart Disorder Sister         bicuspid aortic valve    Obesity Sister         per NG    Heart Disease Sister 43        Congestive heart disease; per NG    No Known Problems Brother       Social History:   Social History     Socioeconomic History    Marital status:    Tobacco Use    Smoking status: Never     Passive exposure: Never    Smokeless tobacco: Never    Tobacco comments:     per NG   Vaping Use    Vaping status: Never Used   Substance and Sexual Activity    Alcohol use: Yes     Alcohol/week: 0.0 standard drinks of alcohol     Comment: socially - 2 beers/week    Drug use: No   Other Topics Concern    Caffeine Concern Yes     Comment: soda, 1 coke a day    Exercise Yes            REVIEW OF SYSTEMS:  RESPIRATORY:  Negative for chest tightness, wheezing, cough, shortness of breath,  sputum production,chest pain or  pleurisy.  CARDIOVASCULAR:  Negative for pain or chest discomfort, dizziness or fainting, palpitations, leg swelling, nocturia, or claudication.  GASTROINTESTINAL:  Negative for nausea, vomiting, diarrhea, constipation, heartburn or indigestion, abdominal pains, bloody or tarry stools.  GENERAL: Denies:  weight gain, weight loss, fever, night sweats, bone pain, malaise, and fatigue. Positive for:  none.  All other review of systems reviewed and otherwise negative    OBJECTIVE:  There were no vitals taken for this visit.  He appears well, in no apparent distress.  Alert and oriented times three, pleasant and cooperative.  CARDIOVASCULAR:normal apical impulse  RESPIRATORY: no chest wall deformities or tenderness  ABDOMEN: abdomen is soft without significant tenderness, masses, organomegaly or guarding  GENITOURINARY:      Penis: no penile lesions or discharge. Meatus normal location and size.      Scrotum: normal in appearance      Right Epididymis and Vas: both are palpably normal.      Right Testis: normal        Left Epididymis and Vas: both are palpably normal.      Left Testis: normal        Inguinal Lymph Nodes: non-palpable both      Prostate: prostate smooth and symmetric without tenderness or nodules, enlarged, 40 grams       Rectal: normal tone, no masses  Skin exam grossly unremarkable.  Intact neurologically grossly  Bladder scan for postvoid residual volume 2 mL    ASSESSMENT/PLAN  No diagnosis found.    No orders of the defined types were placed in this encounter.  Recommend:  - Will provide 4 additional days of Bactrim as he took his last dose of Bactrim prescribed on the 19th yesterday.  - He appears to empty his bladder appropriately and has no bothersome urination symptoms at baseline.  - Follow-up on a as needed basis if he has recurring symptoms.    Meds This Visit:  Requested Prescriptions     Signed Prescriptions Disp Refills    sulfamethoxazole-trimethoprim -160 MG Oral Tab per tablet 8  tablet 0     Sig: Take 1 tablet by mouth 2 (two) times daily for 4 days.       Imaging & Referrals:  None

## 2025-02-13 RX ORDER — LOSARTAN POTASSIUM AND HYDROCHLOROTHIAZIDE 12.5; 5 MG/1; MG/1
1 TABLET ORAL DAILY
Qty: 90 TABLET | Refills: 3 | Status: SHIPPED | OUTPATIENT
Start: 2025-02-13

## 2025-02-13 NOTE — TELEPHONE ENCOUNTER
Please review.  Protocol failed / Has no protocol.    Future Appointments   Date Time Provider Department Center   2/19/2025  8:15 AM Anthony Nesbitt MD [facility]       Requested Prescriptions   Pending Prescriptions Disp Refills    FLUOXETINE 10 MG Oral Cap [Pharmacy Med Name: FLUOXETINE HCL 10 MG CAPSULE] 90 capsule 3     Sig: TAKE 1 CAPSULE BY MOUTH EVERY DAY       Psychiatric Non-Scheduled (Anti-Anxiety) Failed - 2/13/2025 12:10 PM        Failed - Depression Screening completed within the past 12 months        Passed - In person appointment or virtual visit in the past 6 mos or appointment in next 3 mos     Recent Outpatient Visits              5 months ago Cystitis without hematuria    [facility], Carmen Madison MD    Office Visit    5 months ago Urinary tract infection without hematuria, site unspecified    [facility] Anthony Nesbitt MD    Office Visit    7 months ago Acute cough    [facility] Anthony Nesbitt MD    Office Visit    7 months ago Allergic rhinitis, unspecified seasonality, unspecified trigger    [facility] Adonis Mcmullen MD    Office Visit    1 year ago Tinnitus, unspecified laterality    [facility], Aneta Swan Au.D    Office Visit          Future Appointments         Provider Department Appt Notes    In 6 days Anthony Nesbitt MD [facility] Annual physical                    Passed - Medication is active on med list

## 2025-02-13 NOTE — TELEPHONE ENCOUNTER
Refill passed per [facility] protocol.     Requested Prescriptions   Pending Prescriptions Disp Refills    LOSARTAN-HYDROCHLOROTHIAZIDE 50-12.5 MG Oral Tab [Pharmacy Med Name: LOSARTAN-HCTZ 50-12.5 MG TAB] 90 tablet 3     Sig: TAKE 1 TABLET BY MOUTH EVERY DAY       Hypertension Medications Protocol Passed - 2/13/2025 10:10 AM        Passed - CMP or BMP in past 12 months        Passed - Last BP reading less than 140/90     BP Readings from Last 1 Encounters:   08/23/24 124/81               Passed - In person appointment or virtual visit in the past 12 mos or appointment in next 3 mos     Recent Outpatient Visits              5 months ago Cystitis without hematuria    [facility] Carmen Ruiz MD    Office Visit    5 months ago Urinary tract infection without hematuria, site unspecified    [facility] Anthony Nesbitt MD    Office Visit    7 months ago Acute cough    [facility] Anthony Nesbitt MD    Office Visit    7 months ago Allergic rhinitis, unspecified seasonality, unspecified trigger    [facility] Adonis Mcmullen MD    Office Visit    1 year ago Tinnitus, unspecified laterality    [facility], LamyAneta Cordero Au.D    Office Visit          Future Appointments         Provider Department Appt Notes    In 6 days Anthony Nesbitt MD [facility] Annual physical                    Passed - EGFRCR or GFRNAA > 50     GFR Evaluation  EGFRCR: 62 , resulted on 8/24/2024          Passed - Medication is active on med list             [unfilled]      @[email or web address]

## 2025-02-14 RX ORDER — FLUOXETINE 10 MG/1
10 CAPSULE ORAL DAILY
Qty: 90 CAPSULE | Refills: 3 | Status: SHIPPED | OUTPATIENT
Start: 2025-02-14

## 2025-02-19 ENCOUNTER — OFFICE VISIT (OUTPATIENT)
Dept: INTERNAL MEDICINE CLINIC | Facility: CLINIC | Age: 55
End: 2025-02-19
Payer: COMMERCIAL

## 2025-02-19 ENCOUNTER — LAB ENCOUNTER (OUTPATIENT)
Dept: LAB | Age: 55
End: 2025-02-19
Attending: INTERNAL MEDICINE
Payer: COMMERCIAL

## 2025-02-19 VITALS
HEIGHT: 73 IN | BODY MASS INDEX: 30.27 KG/M2 | HEART RATE: 82 BPM | SYSTOLIC BLOOD PRESSURE: 120 MMHG | WEIGHT: 228.38 LBS | DIASTOLIC BLOOD PRESSURE: 78 MMHG

## 2025-02-19 DIAGNOSIS — Z00.00 ANNUAL PHYSICAL EXAM: Primary | ICD-10-CM

## 2025-02-19 DIAGNOSIS — H93.13 TINNITUS OF BOTH EARS: ICD-10-CM

## 2025-02-19 DIAGNOSIS — E78.00 HYPERCHOLESTEREMIA: ICD-10-CM

## 2025-02-19 DIAGNOSIS — G47.33 OSA (OBSTRUCTIVE SLEEP APNEA): ICD-10-CM

## 2025-02-19 DIAGNOSIS — K76.0 NAFLD (NONALCOHOLIC FATTY LIVER DISEASE): ICD-10-CM

## 2025-02-19 DIAGNOSIS — R73.01 IFG (IMPAIRED FASTING GLUCOSE): ICD-10-CM

## 2025-02-19 DIAGNOSIS — Z12.11 COLON CANCER SCREENING: ICD-10-CM

## 2025-02-19 DIAGNOSIS — K21.9 GASTROESOPHAGEAL REFLUX DISEASE WITHOUT ESOPHAGITIS: ICD-10-CM

## 2025-02-19 DIAGNOSIS — Z00.00 ANNUAL PHYSICAL EXAM: ICD-10-CM

## 2025-02-19 DIAGNOSIS — R79.89 ELEVATED SERUM CREATININE: ICD-10-CM

## 2025-02-19 DIAGNOSIS — I10 ESSENTIAL HYPERTENSION: ICD-10-CM

## 2025-02-19 DIAGNOSIS — J30.9 ALLERGIC RHINITIS, UNSPECIFIED SEASONALITY, UNSPECIFIED TRIGGER: ICD-10-CM

## 2025-02-19 DIAGNOSIS — Z12.5 PROSTATE CANCER SCREENING: ICD-10-CM

## 2025-02-19 LAB
ALBUMIN SERPL-MCNC: 5 G/DL (ref 3.2–4.8)
ALBUMIN/GLOB SERPL: 1.8 {RATIO} (ref 1–2)
ALP LIVER SERPL-CCNC: 70 U/L
ALT SERPL-CCNC: 42 U/L
ANION GAP SERPL CALC-SCNC: 10 MMOL/L (ref 0–18)
AST SERPL-CCNC: 27 U/L (ref ?–34)
BASOPHILS # BLD AUTO: 0.03 X10(3) UL (ref 0–0.2)
BASOPHILS NFR BLD AUTO: 0.5 %
BILIRUB SERPL-MCNC: 0.8 MG/DL (ref 0.3–1.2)
BUN BLD-MCNC: 18 MG/DL (ref 9–23)
BUN/CREAT SERPL: 14 (ref 10–20)
CALCIUM BLD-MCNC: 9.6 MG/DL (ref 8.7–10.4)
CERULOPLASMIN SERPL-MCNC: 23 MG/DL (ref 20–60)
CHLORIDE SERPL-SCNC: 101 MMOL/L (ref 98–112)
CHOLEST SERPL-MCNC: 219 MG/DL (ref ?–200)
CO2 SERPL-SCNC: 30 MMOL/L (ref 21–32)
COMPLEXED PSA SERPL-MCNC: 1.75 NG/ML (ref ?–4)
CREAT BLD-MCNC: 1.29 MG/DL
DEPRECATED HBV CORE AB SER IA-ACNC: 162 NG/ML
DEPRECATED RDW RBC AUTO: 41.4 FL (ref 35.1–46.3)
EGFRCR SERPLBLD CKD-EPI 2021: 66 ML/MIN/1.73M2 (ref 60–?)
EOSINOPHIL # BLD AUTO: 0.21 X10(3) UL (ref 0–0.7)
EOSINOPHIL NFR BLD AUTO: 3.2 %
ERYTHROCYTE [DISTWIDTH] IN BLOOD BY AUTOMATED COUNT: 12.6 % (ref 11–15)
EST. AVERAGE GLUCOSE BLD GHB EST-MCNC: 114 MG/DL (ref 68–126)
FASTING PATIENT LIPID ANSWER: YES
FASTING STATUS PATIENT QL REPORTED: YES
GLOBULIN PLAS-MCNC: 2.8 G/DL (ref 2–3.5)
GLUCOSE BLD-MCNC: 97 MG/DL (ref 70–99)
HAV AB SER QL IA: REACTIVE
HAV IGM SER QL: NONREACTIVE
HBA1C MFR BLD: 5.6 % (ref ?–5.7)
HBV CORE AB SERPL QL IA: NONREACTIVE
HBV SURFACE AB SER QL: NONREACTIVE
HBV SURFACE AB SERPL IA-ACNC: <3.1 MIU/ML
HBV SURFACE AG SER-ACNC: <0.1 [IU]/L
HBV SURFACE AG SERPL QL IA: NONREACTIVE
HCT VFR BLD AUTO: 47.5 %
HCV AB SERPL QL IA: NONREACTIVE
HDLC SERPL-MCNC: 43 MG/DL (ref 40–59)
HGB BLD-MCNC: 15.4 G/DL
IGA SERPL-MCNC: 150.4 MG/DL (ref 70–312)
IMM GRANULOCYTES # BLD AUTO: 0.02 X10(3) UL (ref 0–1)
IMM GRANULOCYTES NFR BLD: 0.3 %
IRON SATN MFR SERPL: 26 %
IRON SERPL-MCNC: 85 UG/DL
LDLC SERPL CALC-MCNC: 141 MG/DL (ref ?–100)
LYMPHOCYTES # BLD AUTO: 1.47 X10(3) UL (ref 1–4)
LYMPHOCYTES NFR BLD AUTO: 22.1 %
MCH RBC QN AUTO: 29.1 PG (ref 26–34)
MCHC RBC AUTO-ENTMCNC: 32.4 G/DL (ref 31–37)
MCV RBC AUTO: 89.8 FL
MONOCYTES # BLD AUTO: 0.48 X10(3) UL (ref 0.1–1)
MONOCYTES NFR BLD AUTO: 7.2 %
NEUTROPHILS # BLD AUTO: 4.45 X10 (3) UL (ref 1.5–7.7)
NEUTROPHILS # BLD AUTO: 4.45 X10(3) UL (ref 1.5–7.7)
NEUTROPHILS NFR BLD AUTO: 66.7 %
NONHDLC SERPL-MCNC: 176 MG/DL (ref ?–130)
OSMOLALITY SERPL CALC.SUM OF ELEC: 294 MOSM/KG (ref 275–295)
PLATELET # BLD AUTO: 223 10(3)UL (ref 150–450)
POTASSIUM SERPL-SCNC: 3.8 MMOL/L (ref 3.5–5.1)
PROT SERPL-MCNC: 7.8 G/DL (ref 5.7–8.2)
RBC # BLD AUTO: 5.29 X10(6)UL
SODIUM SERPL-SCNC: 141 MMOL/L (ref 136–145)
TOTAL IRON BINDING CAPACITY: 330 UG/DL (ref 250–425)
TRANSFERRIN SERPL-MCNC: 264 MG/DL (ref 215–365)
TRIGL SERPL-MCNC: 195 MG/DL (ref 30–149)
VLDLC SERPL CALC-MCNC: 36 MG/DL (ref 0–30)
WBC # BLD AUTO: 6.7 X10(3) UL (ref 4–11)

## 2025-02-19 PROCEDURE — 82728 ASSAY OF FERRITIN: CPT

## 2025-02-19 PROCEDURE — 36415 COLL VENOUS BLD VENIPUNCTURE: CPT

## 2025-02-19 PROCEDURE — 86038 ANTINUCLEAR ANTIBODIES: CPT

## 2025-02-19 PROCEDURE — 84466 ASSAY OF TRANSFERRIN: CPT

## 2025-02-19 PROCEDURE — 83540 ASSAY OF IRON: CPT

## 2025-02-19 PROCEDURE — 3074F SYST BP LT 130 MM HG: CPT | Performed by: INTERNAL MEDICINE

## 2025-02-19 PROCEDURE — 99396 PREV VISIT EST AGE 40-64: CPT | Performed by: INTERNAL MEDICINE

## 2025-02-19 PROCEDURE — 82103 ALPHA-1-ANTITRYPSIN TOTAL: CPT

## 2025-02-19 PROCEDURE — 90471 IMMUNIZATION ADMIN: CPT | Performed by: INTERNAL MEDICINE

## 2025-02-19 PROCEDURE — 85025 COMPLETE CBC W/AUTO DIFF WBC: CPT

## 2025-02-19 PROCEDURE — 80061 LIPID PANEL: CPT

## 2025-02-19 PROCEDURE — 90750 HZV VACC RECOMBINANT IM: CPT | Performed by: INTERNAL MEDICINE

## 2025-02-19 PROCEDURE — 82784 ASSAY IGA/IGD/IGG/IGM EACH: CPT

## 2025-02-19 PROCEDURE — 86225 DNA ANTIBODY NATIVE: CPT

## 2025-02-19 PROCEDURE — 3078F DIAST BP <80 MM HG: CPT | Performed by: INTERNAL MEDICINE

## 2025-02-19 PROCEDURE — 86803 HEPATITIS C AB TEST: CPT

## 2025-02-19 PROCEDURE — 83036 HEMOGLOBIN GLYCOSYLATED A1C: CPT

## 2025-02-19 PROCEDURE — 83516 IMMUNOASSAY NONANTIBODY: CPT

## 2025-02-19 PROCEDURE — 86364 TISS TRNSGLTMNASE EA IG CLAS: CPT

## 2025-02-19 PROCEDURE — 82390 ASSAY OF CERULOPLASMIN: CPT

## 2025-02-19 PROCEDURE — 3008F BODY MASS INDEX DOCD: CPT | Performed by: INTERNAL MEDICINE

## 2025-02-19 PROCEDURE — 87340 HEPATITIS B SURFACE AG IA: CPT

## 2025-02-19 PROCEDURE — 86709 HEPATITIS A IGM ANTIBODY: CPT

## 2025-02-19 PROCEDURE — 86708 HEPATITIS A ANTIBODY: CPT

## 2025-02-19 PROCEDURE — 86706 HEP B SURFACE ANTIBODY: CPT

## 2025-02-19 PROCEDURE — 86704 HEP B CORE ANTIBODY TOTAL: CPT

## 2025-02-19 PROCEDURE — 80053 COMPREHEN METABOLIC PANEL: CPT

## 2025-02-19 NOTE — PROGRESS NOTES
Subjective:     Patient ID: Terry Dong is a 54 year old male.    Pt presents today for his annual physical         History/Other:   Review of Systems   Constitutional: Negative.    HENT:  Positive for congestion and tinnitus.    Eyes: Negative.    Respiratory: Negative.     Cardiovascular: Negative.  Negative for chest pain, palpitations and leg swelling.        No pnd   Gastrointestinal: Negative.    Genitourinary: Negative.         No nocturia   Allergic/Immunologic: Positive for environmental allergies. Negative for immunocompromised state.   Neurological:  Negative for syncope.   Hematological: Negative.      Current Outpatient Medications   Medication Sig Dispense Refill    FLUoxetine 10 MG Oral Cap Take 1 capsule (10 mg total) by mouth daily. 90 capsule 3    losartan-hydroCHLOROthiazide 50-12.5 MG Oral Tab Take 1 tablet by mouth daily. 90 tablet 3    Omega-3 Fatty Acids (FISH OIL OR) Take by mouth.      ESOMEPRAZOLE MAGNESIUM 40 MG Oral Capsule Delayed Release TAKE 1 CAPSULE (40 MG TOTAL) BY MOUTH DAILY. 90 capsule 0    Fluticasone Propionate  MCG/ACT Inhalation Aerosol Inhale 2 puffs into the lungs 2 (two) times daily. 1 each 0    guaiFENesin 100 MG/5 ML Oral Take 10 mL (200 mg total) by mouth every 4 (four) hours as needed (cough). (Patient not taking: Reported on 7/18/2024) 118 mL 0    methylPREDNISolone (MEDROL) 4 MG Oral Tablet Therapy Pack As directed. (Patient not taking: Reported on 7/18/2024) 1 each 0    albuterol (PROAIR HFA) 108 (90 Base) MCG/ACT Inhalation Aero Soln Inhale 2 puffs into the lungs every 4 (four) hours as needed for Wheezing. 1 each 0    benzonatate 200 MG Oral Cap Take 1 capsule (200 mg total) by mouth 3 (three) times daily as needed for cough. (Patient not taking: Reported on 7/18/2024) 60 capsule 0    loratadine-pseudoephedrine ER ([employer] ALLERGY RELIEF-D12) 5-120 MG Oral Tablet 12 Hr Take 1 tablet by mouth 2 (two) times daily. (Patient not taking: Reported on 2/19/2025) 180  tablet 1     Allergies:Allergies[1]    Past Medical History:    Allergic rhinitis    Anxiety    Blepharitis of both eyes    OU; per NG    Essential hypertension    GERD (gastroesophageal reflux disease)    PPI; per NG    History of pneumonia    per NG    History of snoring    per NG    Hx of bee sting allergy    Epi-pen; per NG    Hx of hiatal hernia    per NG    Myopia of both eyes with astigmatism    OU; per NG    Prostatitis    per NG    Sinus problem    Sinus problems; per NG    Sleep apnea    Superficial punctate keratitis    OS; per NG      Past Surgical History:   Procedure Laterality Date    Colonoscopy  10/19/2015    internal hemorrhoids only    Egd      Electrocardiogram, complete  01-    SCANNED TO MEDIA TAB: 01-    Excision turbinate,submucous  04/12/2018    Extraction erupted tooth/exr  2014    Impacted wisdom tooth: Extraction; per NG    Nasal scopy,remv part ethmoid  04/12/2018    Nasal scopy,rmv tiss maxill sinus Left 04/12/2018    Repair of nasal septum  04/12/2018    Upper gi endoscopy performed        Family History   Problem Relation Age of Onset    Heart Disorder Father     Diabetes Father 67        per NG    Obesity Father         per NG    Other (cardiac MI) Father     Hypertension Mother         per NG    Obesity Mother         per NG    Thyroid Disorder Mother         per NG    Heart Disorder Mother         Cardiac stent at 71 y/o    Other (Other) Maternal Grandmother     Other (Other) Maternal Grandfather     Other (Other) Paternal Grandmother     Heart Disease Paternal Grandfather 70        CAD, Cause of death; per NG    Other (Other) Paternal Grandfather     Heart Disorder Sister         bicuspid aortic valve    Obesity Sister         per NG    Heart Disease Sister 43        Congestive heart disease; per NG    No Known Problems Brother       Social History:   Social History     Socioeconomic History    Marital status:    Tobacco Use    Smoking status: Never      Passive exposure: Never    Smokeless tobacco: Never    Tobacco comments:     per NG   Vaping Use    Vaping status: Never Used   Substance and Sexual Activity    Alcohol use: Yes     Alcohol/week: 0.0 standard drinks of alcohol     Comment: socially - 2 beers/week    Drug use: No   Other Topics Concern    Caffeine Concern Yes     Comment: soda, 1 coke a day    Exercise Yes        Objective:   Physical Exam  Constitutional:       General: He is not in acute distress.     Appearance: He is well-developed. He is obese. He is not ill-appearing, toxic-appearing or diaphoretic.   HENT:      Head: Normocephalic and atraumatic.      Right Ear: Tympanic membrane, ear canal and external ear normal.      Left Ear: Tympanic membrane, ear canal and external ear normal.      Nose: Nose normal.      Mouth/Throat:      Pharynx: No oropharyngeal exudate.   Eyes:      General: No scleral icterus.        Right eye: No discharge.         Left eye: No discharge.      Conjunctiva/sclera: Conjunctivae normal.      Pupils: Pupils are equal, round, and reactive to light.   Neck:      Thyroid: No thyromegaly.      Vascular: No carotid bruit or JVD.   Cardiovascular:      Rate and Rhythm: Normal rate and regular rhythm.      Pulses: Normal pulses.      Heart sounds: Normal heart sounds. No murmur heard.     No friction rub. No gallop.   Pulmonary:      Effort: Pulmonary effort is normal. No respiratory distress.      Breath sounds: Normal breath sounds. No wheezing or rales.   Abdominal:      General: Abdomen is flat. Bowel sounds are normal. There is no distension.      Palpations: Abdomen is soft. There is no mass.      Tenderness: There is no abdominal tenderness. There is no guarding or rebound.   Musculoskeletal:         General: Normal range of motion.      Cervical back: Normal range of motion and neck supple. No rigidity or tenderness.      Right lower leg: No edema.      Left lower leg: No edema.   Lymphadenopathy:      Cervical: No  cervical adenopathy.   Skin:     General: Skin is warm and dry.      Coloration: Skin is not jaundiced or pale.      Findings: No rash.   Neurological:      Mental Status: He is alert and oriented to person, place, and time.   Psychiatric:         Mood and Affect: Mood normal.         Assessment & Plan:   (Z00.00) Annual physical exam  (primary encounter diagnosis)  Plan: CBC With Differential With Platelet, Comp         Metabolic Panel (14), Hemoglobin A1C, Lipid         Panel, PSA Total, Screen        Labs have been ordered.  The patient given his second Shingrix vaccine.  Asked to come back for his tetanus booster.    (I10) Essential hypertension  Plan: Blood pressure controlled current blood pressure meds.  CPM.    (R73.01) IFG (impaired fasting glucose)  Plan: Will check his fasting glucose and A1c.  Cut down on carbs and work on losing weight.    (E78.00) Hypercholesteremia  Plan: Will check his lipid panel.  Follow low-fat low-cholesterol diet.    (K21.9) Gastroesophageal reflux disease without esophagitis  Plan: Stable on current PPI.    (G47.33) MICHELLE (obstructive sleep apnea)  Plan: He is using a mouthguard.  CPM.    (J30.9) Allergic rhinitis, unspecified seasonality, unspecified trigger  Plan: Stable on as needed doses of antihistamines.  CPM.    (Z12.5) Prostate cancer screening  Plan: Check PSA.  He did have a mildly enlarged prostate on examination by urologist before but was emptying his bladder completely on bladder ultrasound.    (Z12.11) Colon cancer screening  Plan: He is current with his colonoscopy and will be due when he turns 56 years old.    (H93.13) Tinnitus of both ears  Plan: This has been chronic and has been evaluated already by ENT before.    (K76.0) NAFLD (nonalcoholic fatty liver disease)  Plan: Hep A AB, Total, Hep B Core AB, Tot, Hepatitis         B Surface Antibody, Hepatitis B Surface         Antigen, HCV Antibody, Actin (Smooth Muscle)         Antibody, Alpha-1-antirypsin, serum,          Ceruloplasmin, Ferritin, Iron And Tibc,         Connective Tissue Disease (NATALY) Screen [E],         CELIAC DISEASE SCREEN Reflex [E]        Discussed with the patient regarding fatty liver.  He only consumes minimal amount of alcohol but still advised to discontinue.  Will do some screening test other causes of fatty liver but this is likely related to his obesity which we talked about that he needs to work on and lose weight.    (Z68.30) BMI 30.0-30.9,adult  Plan: A discussion was made with the patient regarding the need for him to lose weight.  Follow-up calorie diet as well as regular exercise.  Can consider seeing bariatric clinic if he is not able to lose weight on his own.       No orders of the defined types were placed in this encounter.      Meds This Visit:  Requested Prescriptions      No prescriptions requested or ordered in this encounter       Imaging & Referrals:  None            [1]   Allergies  Allergen Reactions    Bee ANAPHYLAXIS     Other reaction(s): Anaphylaxis, throat clouser    Beeswax ANAPHYLAXIS     Other reaction(s): Anaphylaxis,throat  closed    Seasonal Runny nose     Sneezing, sinus congestion

## 2025-02-20 LAB
A-1-ANTITRYPSIN: 148 MG/DL
ACTIN SMOOTH MUSCLE AB: 2 UNITS
DSDNA IGG SERPL IA-ACNC: 2.1 IU/ML
ENA AB SER QL IA: 0.3 UG/L
ENA AB SER QL IA: NEGATIVE
TTG IGA SER-ACNC: <0.2 U/ML (ref ?–7)

## (undated) DIAGNOSIS — R05.9 COUGH: Primary | ICD-10-CM

## (undated) DIAGNOSIS — R07.0 THROAT PAIN: Primary | ICD-10-CM

## (undated) NOTE — LETTER
No referring provider defined for this encounter. 05/29/18        Patient: Ariane Hope   YOB: 1970   Date of Visit: 5/29/2018       Dear  Dr. Noy Ng MD,      Thank you for referring Ariane Hope to our  practice. operative care--please see instructions below.    (R35.1) Nocturia  Pt has AUA score of 10, moderate voiding dysfunction category.  I reviewed treatment options with pt and I answered pt's questions on treatment; pt understands and chooses to submit urine f

## (undated) NOTE — ED AVS SNAPSHOT
Amira Gaona   MRN: [id number]    Department:  [facility] Emergency Department   Date of Visit:  8/4/2019           Disclosure     Insurance plans vary and the physician(s) referred by the ER may not be covered by your plan.  Please contact your CARE PHYSICIAN AT ONCE OR RETURN IMMEDIATELY TO THE EMERGENCY DEPARTMENT. If you have been prescribed any medication(s), please fill your prescription right away and begin taking the medication(s) as directed.   If you believe that any of the medications

## (undated) NOTE — LETTER
Ngoc 15, 2019     [address] [place] [address] Drive [address]      Dear Matt Winter:    Below are the results from your recent visit:  Blood sugars, kidney function, liver function and blood count all look good.    Resulted Orders   COMP METABOLIC PA

## (undated) NOTE — MR AVS SNAPSHOT
[place]  [address]  [phone]               Thank you for choosing us for [facility]   We are glad to serve you and happy to provide you with this summar discharge instructions in [employer] by going to Visits < Admission Summaries. If you've been to the Emergency Department or your doctor's office, you can view your past visit information in [employer] by going to Visits < Visit Summaries. [employer] questions?

## (undated) NOTE — LETTER
7/12/2017              [address] [facility] [address]         Dear Emmie Runner,    As you know, regular medical attention is essential to maintaining your health.   Please contact the office at your earliest convenience to bonnie

## (undated) NOTE — MR AVS SNAPSHOT
[address]  [address] [facility]  [phone]               Thank you for choosing us for your health care visit with Babar Stewart MD.  We are glad to serve you and happy to provide you with this s Take 1 capsule (40 mg total) by mouth daily. Commonly known as:  [employer] [place]                   Today's Orders     EKG In-Office [45595]    Complete by:  As directed    Assoc Dx:   Other chest pain [R07.89]                 Follow-up Instructions     Return if s "[employer]hart questions? Call [phone] for help. [employer] is NOT to be used for urgent needs. For medical emergencies, dial 911.            Visit [facility] online at  [email or web address]

## (undated) NOTE — Clinical Note
6/2/2017              [address] [facility] [address]         To whom it may concern,    Mr. Millie Scott is under my care and has been treated with Nexium with good results.    Please reconsider denial of this medication for t

## (undated) NOTE — LETTER
No referring provider defined for this encounter.        07/02/18        Patient: Yazmin Aguilar   YOB: 1970   Date of Visit: 7/2/2018       Dear  Dr. Yaquelin Travis MD,      To workup patient is continuing penile pain complaints, I per

## (undated) NOTE — LETTER
AUTHORIZATION FOR SURGICAL OPERATION OR OTHER PROCEDURE    1. I hereby authorize /Kamlesh Alegria, and [place] [address] [facility] [facility] staff assigned to my case to perform the following operation and/or procedure at the [facility], [facility]:    ________________________________________Right Shoulder Cortisone Injection _______________________________________________________      _______________________________________________________________________________________________    2. My physician has explained the nature and purpose of the operation or other procedure, possible alternative methods of treatment, the risks involved, and the possibility of complication to me. I acknowledge that no guarantee has been made as to the result that may be obtained. 3.  I recognize that, during the course of this operation, or other procedure, unforseen conditions may necessitate additional or different procedure than those listed above. I, therefore, further authorize and request that the above named physician, his/her physician assistants or designees perform such procedures as are, in his/her professional opinion, necessary and desirable. 4.  Any tissue or organs removed in the operation or other procedure may be disposed of by and at the discretion of the [facility], [facility] and [facility] AT [facility]. 5.  I understand that in the event of a medical emergency, I will be transported by local paramedics to [facility] or other hospital emergency department. 6.  I certify that I have read and fully understand the above consent to operation and/or other procedure. 7.  I acknowledge that my physician has explained sedation/analgesia administration to me including the risks and benefits. I consent to the administration of sedation/analgesia as may be necessary or desirable in the judgement of my physician.     Witness signature: ___________________________________________________ Date:  ______/______/_____ Time:  ________ A. M.  P.M. Patient Name:  ______________________________________________________  (please print)      Patient signature:  ___________________________________________________             Relationship to Patient:           []  Parent    Responsible person                          []  Spouse  In case of minor or                    [] Other  _____________   Incompetent name:  __________________________________________________                               (please print)      _____________      Responsible person  In case of minor or  Incompetent signature:  _______________________________________________    Statement of Physician  My signature below affirms that prior to the time of the procedure, I have explained to the patient and/or his/her guardian, the risks and benefits involved in the proposed treatment and any reasonable alternative to the proposed treatment. I have also explained the risks and benefits involved in the refusal of the proposed treatment and have answered the patient's questions.                         Date:  ______/______/_______  Provider                      Signature:  __________________________________________________________       Time:  ___________ A.M    P.M.

## (undated) NOTE — LETTER
7/6/2017          To Whom It May Concern:    Parth Lowry is currently under my medical care and is being treated for GERD. He is stable on esomeprazole 40 mg daily and needs to remain on therapy. If you have questions, feel free to call my office.

## (undated) NOTE — MR AVS SNAPSHOT
[address]  [phone]               Thank you for choosing us for your health care visit with Ning Dodson MD.  We are glad to serve you and happy to provide you with this summary of your · If you recently started a new medicine, stopped a medicine, or had the dose of a current medicine changed, talk with the prescribing healthcare provider. Your medicine plan may need adjustment.   · If dizziness lasts more than a few seconds, sit or lie do taking this medication, and follow the directions you see here. Commonly known as:  KLONOPIN           EPIPEN 0.3 MG/0.3ML Soaj   Generic drug:  EPINEPHrine           Esomeprazole Magnesium 40 MG Cpdr   TAKE 1 CAPSULE (40 MG TOTAL) BY MOUTH DAILY.    Comm Women and lighter weight persons: limit to <= 1 drink* per day. Healthy Diet and Regular Exercise  [employer] [address] for making healthy food choices  -   Enjoy your food, but eat less.   Fully enjoy your food when ea

## (undated) NOTE — MR AVS SNAPSHOT
[place]  [address]  [phone]               Thank you for choosing us for your health care visit with Shea Narvaez MD.  We are glad to serve you and happy to provide you with this summary Eustachian tube disorder, right    -  Primary      Instructions and Information about Your Health     None      Allergies as of Apr 03, 2017     Bee     Other reaction(s): Anaphylaxis, throat clouser    Beeswax     Other reaction(s):  Anaphylaxis,throat  c

## (undated) NOTE — MR AVS SNAPSHOT
[address]  [address] [facility]  [phone]               Thank you for choosing us for your health care visit with Mark Liu MD.  We are glad to serve you and happy to provide you with this s authorization numbers or be assured that none are required. You can then schedule your appointment. Failure to obtain required authorization numbers can create reimbursement difficulties for you.     Assoc Dx:  Tinnitus, bilateral [H93.13], Decreased hearin [email or web address]

## (undated) NOTE — MR AVS SNAPSHOT
[address] [facility] [phone]               Thank you for choosing us for your health care visit with [address]   We are glad to serve you and happy to provide you with [facility] office, you can view your past visit information in iLive by going to Visits < Visit Summaries. iLive questions? Call [phone] for help. iLive is NOT to be used for urgent needs. For medical emergencies, dial 911.            Visit [facility]

## (undated) NOTE — ED AVS SNAPSHOT
Lucindagerri Garima   MRN: [id number]    Department:  [facility] Emergency Department   Date of Visit:  8/9/2017           Disclosure     Insurance plans vary and the physician(s) referred by the ER may not be covered by your plan.  Please contact your CARE PHYSICIAN AT ONCE OR RETURN IMMEDIATELY TO THE EMERGENCY DEPARTMENT. If you have been prescribed any medication(s), please fill your prescription right away and begin taking the medication(s) as directed.   If you believe that any of the medications

## (undated) NOTE — ED AVS SNAPSHOT
[facility] in [facility] [address]  [address] [facility]    Phone:  [phone]    Fax:  [phone]           Elder Foster   MRN: [id number]    Department:  [facility] in [address] [place] [address]   Date of Visit:  2/ ACUTE BACTERIAL RHINOSINUSITIS (ABRS) (ENGLISH)      Disclosure     Insurance plans vary and the physician(s) referred by the Immediate Care may not be covered by your plan.   It is possible that the physician may not participate in your health insurance p IF THERE IS ANY CHANGE OR WORSENING OF YOUR CONDITION, CALL YOUR PRIMARY CARE PHYSICIAN AT ONCE OR GO TO THE EMERGENCY DEPARTMENT.     If you have been prescribed any medication(s), please fill your prescription right away and begin taking the medication(s) you to explore options for quitting.     - If you have concerns related to behavioral health issues or thoughts of harming yourself, contact [facility] at [phone].     - If you don’t have insurance, Akila Peck